# Patient Record
Sex: MALE | Race: WHITE | NOT HISPANIC OR LATINO | Employment: FULL TIME | URBAN - METROPOLITAN AREA
[De-identification: names, ages, dates, MRNs, and addresses within clinical notes are randomized per-mention and may not be internally consistent; named-entity substitution may affect disease eponyms.]

---

## 2018-05-07 ENCOUNTER — CONVERSION ENCOUNTER (OUTPATIENT)
Dept: FAMILY MEDICINE CLINIC | Facility: CLINIC | Age: 38
End: 2018-05-07

## 2018-05-07 ENCOUNTER — OFFICE VISIT CONVERTED (OUTPATIENT)
Dept: FAMILY MEDICINE CLINIC | Facility: CLINIC | Age: 38
End: 2018-05-07
Attending: FAMILY MEDICINE

## 2018-06-21 ENCOUNTER — OFFICE VISIT CONVERTED (OUTPATIENT)
Dept: CARDIOLOGY | Facility: CLINIC | Age: 38
End: 2018-06-21
Attending: INTERNAL MEDICINE

## 2018-10-11 ENCOUNTER — OFFICE VISIT CONVERTED (OUTPATIENT)
Dept: CARDIOLOGY | Facility: CLINIC | Age: 38
End: 2018-10-11
Attending: INTERNAL MEDICINE

## 2018-11-13 ENCOUNTER — OFFICE VISIT CONVERTED (OUTPATIENT)
Dept: FAMILY MEDICINE CLINIC | Facility: CLINIC | Age: 38
End: 2018-11-13
Attending: FAMILY MEDICINE

## 2018-12-21 ENCOUNTER — OFFICE VISIT CONVERTED (OUTPATIENT)
Dept: FAMILY MEDICINE CLINIC | Facility: CLINIC | Age: 38
End: 2018-12-21
Attending: NURSE PRACTITIONER

## 2019-01-11 ENCOUNTER — OFFICE VISIT CONVERTED (OUTPATIENT)
Dept: CARDIOLOGY | Facility: CLINIC | Age: 39
End: 2019-01-11
Attending: INTERNAL MEDICINE

## 2019-01-30 ENCOUNTER — OFFICE VISIT CONVERTED (OUTPATIENT)
Dept: GASTROENTEROLOGY | Facility: CLINIC | Age: 39
End: 2019-01-30
Attending: NURSE PRACTITIONER

## 2019-02-04 ENCOUNTER — HOSPITAL ENCOUNTER (OUTPATIENT)
Dept: LAB | Facility: HOSPITAL | Age: 39
Discharge: HOME | End: 2019-02-04
Attending: NURSE PRACTITIONER

## 2019-02-04 LAB
CONV AFP: 5.2 NG/ML (ref 0–8.7)
FERRITIN SERPL-MCNC: 869 NG/ML (ref 30–300)
INR PPP: 1.01 (ref 2–3)
IRON SATN MFR SERPL: 25 % (ref 20–55)
IRON SERPL-MCNC: 93 UG/DL (ref 70–180)
PROTHROMBIN TIME: 10.5 S (ref 9.4–12)
TIBC SERPL-MCNC: 378 UG/DL (ref 245–450)
TRANSFERRIN SERPL-MCNC: 264 MG/DL (ref 215–365)

## 2019-02-05 LAB
CERULOPLASMIN SERPL-MCNC: 21.3 MG/DL (ref 16–31)
CONV ANTI NUCLEAR ANTIBODY WITH REFLEX: NEGATIVE
CONV HEPATITIS B SURFACE AG W CONFIRMATION RE: NEGATIVE
CONV IMMUNOGLOBULIN G (IGG): 825 MG/DL (ref 700–1600)
CONV IMMUNOGLOBULIN M (IGM): 118 MG/DL (ref 20–172)
DEPRECATED MITOCHONDRIA M2 IGG SER-ACNC: <20 UNITS (ref 0–20)
HAV IGM SERPL QL IA: NEGATIVE
HBV CORE IGM SERPL QL IA: NEGATIVE
HCV AB SER DONR QL: <0.1 S/CO RATIO (ref 0–0.9)
IGA SERPL-MCNC: 243 MG/DL (ref 90–386)
SMOOTH MUSCLE F-ACTIN AB IGG: 9 UNITS (ref 0–19)

## 2019-02-06 LAB — COPPER SERPL-MCNC: 82 UG/DL (ref 72–166)

## 2019-02-07 LAB — CONV NASH FIBROSURE: NORMAL

## 2019-02-08 LAB
A1AT SERPL-MCNC: 130 MG/DL (ref 90–200)
PHENOTYPE: NORMAL

## 2019-02-28 ENCOUNTER — OFFICE VISIT CONVERTED (OUTPATIENT)
Dept: GASTROENTEROLOGY | Facility: CLINIC | Age: 39
End: 2019-02-28
Attending: NURSE PRACTITIONER

## 2019-05-06 ENCOUNTER — HOSPITAL ENCOUNTER (OUTPATIENT)
Dept: GENERAL RADIOLOGY | Facility: HOSPITAL | Age: 39
Discharge: HOME | End: 2019-05-06
Attending: NURSE PRACTITIONER

## 2019-07-10 ENCOUNTER — HOSPITAL ENCOUNTER (OUTPATIENT)
Dept: LAB | Facility: HOSPITAL | Age: 39
Discharge: HOME | End: 2019-07-10
Attending: NURSE PRACTITIONER

## 2019-07-10 LAB
ALBUMIN SERPL-MCNC: 4.7 G/DL (ref 3.5–5)
ALBUMIN SERPL-MCNC: 4.8 G/DL (ref 3.5–5)
ALBUMIN/GLOB SERPL: 1.6 {RATIO} (ref 1.4–2.6)
ALP SERPL-CCNC: 39 U/L (ref 53–128)
ALP SERPL-CCNC: 40 U/L (ref 53–128)
ALT SERPL-CCNC: 122 U/L (ref 10–40)
ALT SERPL-CCNC: 122 U/L (ref 10–40)
ANION GAP SERPL CALC-SCNC: 18 MMOL/L (ref 8–19)
AST SERPL-CCNC: 59 U/L (ref 15–50)
AST SERPL-CCNC: 59 U/L (ref 15–50)
BASOPHILS # BLD AUTO: 0.02 10*3/UL (ref 0–0.2)
BASOPHILS NFR BLD AUTO: 0.3 % (ref 0–3)
BILIRUB SERPL-MCNC: 0.49 MG/DL (ref 0.2–1.3)
BILIRUB SERPL-MCNC: 0.52 MG/DL (ref 0.2–1.3)
BUN SERPL-MCNC: 15 MG/DL (ref 5–25)
BUN/CREAT SERPL: 15 {RATIO} (ref 6–20)
CALCIUM SERPL-MCNC: 9.4 MG/DL (ref 8.7–10.4)
CHLORIDE SERPL-SCNC: 100 MMOL/L (ref 99–111)
CHOLEST SERPL-MCNC: 239 MG/DL (ref 107–200)
CHOLEST/HDLC SERPL: 8 {RATIO} (ref 3–6)
CONV ABS IMM GRAN: 0.06 10*3/UL (ref 0–0.2)
CONV BILI, CONJUGATED: <0.2 MG/DL (ref 0–0.6)
CONV CO2: 26 MMOL/L (ref 22–32)
CONV IMMATURE GRAN: 0.9 % (ref 0–1.8)
CONV TOTAL PROTEIN: 7.5 G/DL (ref 6.3–8.2)
CONV TOTAL PROTEIN: 7.6 G/DL (ref 6.3–8.2)
CONV UNCONJUGATED BILIRUBIN: 0.3 MG/DL (ref 0–1.1)
CREAT UR-MCNC: 1 MG/DL (ref 0.7–1.2)
DEPRECATED RDW RBC AUTO: 39.7 FL (ref 35.1–43.9)
EOSINOPHIL # BLD AUTO: 0.14 10*3/UL (ref 0–0.7)
EOSINOPHIL # BLD AUTO: 2.1 % (ref 0–7)
ERYTHROCYTE [DISTWIDTH] IN BLOOD BY AUTOMATED COUNT: 12.8 % (ref 11.6–14.4)
GFR SERPLBLD BASED ON 1.73 SQ M-ARVRAT: >60 ML/MIN/{1.73_M2}
GLOBULIN UR ELPH-MCNC: 2.9 G/DL (ref 2–3.5)
GLUCOSE SERPL-MCNC: 102 MG/DL (ref 70–99)
HBA1C MFR BLD: 14.2 G/DL (ref 14–18)
HCT VFR BLD AUTO: 42.7 % (ref 42–52)
HDLC SERPL-MCNC: 30 MG/DL (ref 40–60)
INR PPP: 1.03 (ref 2–3)
LDLC SERPL CALC-MCNC: 148 MG/DL (ref 70–100)
LYMPHOCYTES # BLD AUTO: 2.87 10*3/UL (ref 1–5)
MCH RBC QN AUTO: 28.7 PG (ref 27–31)
MCHC RBC AUTO-ENTMCNC: 33.3 G/DL (ref 33–37)
MCV RBC AUTO: 86.4 FL (ref 80–96)
MONOCYTES # BLD AUTO: 0.45 10*3/UL (ref 0.2–1.2)
MONOCYTES NFR BLD AUTO: 6.6 % (ref 3–10)
NEUTROPHILS # BLD AUTO: 3.28 10*3/UL (ref 2–8)
NEUTROPHILS NFR BLD AUTO: 48 % (ref 30–85)
NRBC CBCN: 0 % (ref 0–0.7)
OSMOLALITY SERPL CALC.SUM OF ELEC: 291 MOSM/KG (ref 273–304)
PLATELET # BLD AUTO: 199 10*3/UL (ref 130–400)
PMV BLD AUTO: 11.2 FL (ref 9.4–12.4)
POTASSIUM SERPL-SCNC: 4.2 MMOL/L (ref 3.5–5.3)
PROTHROMBIN TIME: 10.6 S (ref 9.4–12)
RBC # BLD AUTO: 4.94 10*6/UL (ref 4.7–6.1)
SODIUM SERPL-SCNC: 140 MMOL/L (ref 135–147)
TRIGL SERPL-MCNC: 481 MG/DL (ref 40–150)
VARIANT LYMPHS NFR BLD MANUAL: 42.1 % (ref 20–45)
WBC # BLD AUTO: 6.82 10*3/UL (ref 4.8–10.8)

## 2019-07-17 ENCOUNTER — OFFICE VISIT CONVERTED (OUTPATIENT)
Dept: CARDIOLOGY | Facility: CLINIC | Age: 39
End: 2019-07-17
Attending: INTERNAL MEDICINE

## 2019-08-28 ENCOUNTER — OFFICE VISIT CONVERTED (OUTPATIENT)
Dept: GASTROENTEROLOGY | Facility: CLINIC | Age: 39
End: 2019-08-28
Attending: NURSE PRACTITIONER

## 2019-09-11 ENCOUNTER — HOSPITAL ENCOUNTER (OUTPATIENT)
Dept: LAB | Facility: HOSPITAL | Age: 39
Discharge: HOME | End: 2019-09-11
Attending: NURSE PRACTITIONER

## 2019-09-11 LAB
APTT BLD: 23.7 S (ref 22.2–34.2)
BASOPHILS # BLD AUTO: 0.03 10*3/UL (ref 0–0.2)
BASOPHILS NFR BLD AUTO: 0.4 % (ref 0–3)
CONV ABS IMM GRAN: 0.02 10*3/UL (ref 0–0.2)
CONV IMMATURE GRAN: 0.3 % (ref 0–1.8)
DEPRECATED RDW RBC AUTO: 37.3 FL (ref 35.1–43.9)
EOSINOPHIL # BLD AUTO: 0.18 10*3/UL (ref 0–0.7)
EOSINOPHIL # BLD AUTO: 2.3 % (ref 0–7)
ERYTHROCYTE [DISTWIDTH] IN BLOOD BY AUTOMATED COUNT: 12.4 % (ref 11.6–14.4)
HCT VFR BLD AUTO: 41.5 % (ref 42–52)
HGB BLD-MCNC: 14.4 G/DL (ref 14–18)
INR PPP: 1.02 (ref 2–3)
LYMPHOCYTES # BLD AUTO: 3.93 10*3/UL (ref 1–5)
LYMPHOCYTES NFR BLD AUTO: 49.9 % (ref 20–45)
MCH RBC QN AUTO: 29 PG (ref 27–31)
MCHC RBC AUTO-ENTMCNC: 34.7 G/DL (ref 33–37)
MCV RBC AUTO: 83.5 FL (ref 80–96)
MONOCYTES # BLD AUTO: 0.5 10*3/UL (ref 0.2–1.2)
MONOCYTES NFR BLD AUTO: 6.4 % (ref 3–10)
NEUTROPHILS # BLD AUTO: 3.21 10*3/UL (ref 2–8)
NEUTROPHILS NFR BLD AUTO: 40.7 % (ref 30–85)
NRBC CBCN: 0 % (ref 0–0.7)
PLATELET # BLD AUTO: 204 10*3/UL (ref 130–400)
PMV BLD AUTO: 10.7 FL (ref 9.4–12.4)
PROTHROMBIN TIME: 10.6 S (ref 9.4–12)
RBC # BLD AUTO: 4.97 10*6/UL (ref 4.7–6.1)
WBC # BLD AUTO: 7.87 10*3/UL (ref 4.8–10.8)

## 2019-09-13 ENCOUNTER — HOSPITAL ENCOUNTER (OUTPATIENT)
Dept: CT IMAGING | Facility: HOSPITAL | Age: 39
Discharge: HOME | End: 2019-09-13
Attending: NURSE PRACTITIONER

## 2019-10-21 ENCOUNTER — HOSPITAL ENCOUNTER (OUTPATIENT)
Dept: LAB | Facility: HOSPITAL | Age: 39
Discharge: HOME | End: 2019-10-21
Attending: INTERNAL MEDICINE

## 2019-10-21 LAB
ALBUMIN SERPL-MCNC: 5.1 G/DL (ref 3.5–5)
ALBUMIN/GLOB SERPL: 2 {RATIO} (ref 1.4–2.6)
ALP SERPL-CCNC: 38 U/L (ref 53–128)
ALT SERPL-CCNC: 96 U/L (ref 10–40)
ANION GAP SERPL CALC-SCNC: 19 MMOL/L (ref 8–19)
AST SERPL-CCNC: 51 U/L (ref 15–50)
BILIRUB SERPL-MCNC: 0.55 MG/DL (ref 0.2–1.3)
BUN SERPL-MCNC: 15 MG/DL (ref 5–25)
BUN/CREAT SERPL: 16 {RATIO} (ref 6–20)
CALCIUM SERPL-MCNC: 9.9 MG/DL (ref 8.7–10.4)
CHLORIDE SERPL-SCNC: 101 MMOL/L (ref 99–111)
CHOLEST SERPL-MCNC: 242 MG/DL (ref 107–200)
CHOLEST/HDLC SERPL: 7.3 {RATIO} (ref 3–6)
CONV CO2: 23 MMOL/L (ref 22–32)
CONV TOTAL PROTEIN: 7.6 G/DL (ref 6.3–8.2)
CREAT UR-MCNC: 0.94 MG/DL (ref 0.7–1.2)
GFR SERPLBLD BASED ON 1.73 SQ M-ARVRAT: >60 ML/MIN/{1.73_M2}
GLOBULIN UR ELPH-MCNC: 2.5 G/DL (ref 2–3.5)
GLUCOSE SERPL-MCNC: 96 MG/DL (ref 70–99)
HDLC SERPL-MCNC: 33 MG/DL (ref 40–60)
LDLC SERPL CALC-MCNC: 132 MG/DL (ref 70–100)
OSMOLALITY SERPL CALC.SUM OF ELEC: 289 MOSM/KG (ref 273–304)
POTASSIUM SERPL-SCNC: 4 MMOL/L (ref 3.5–5.3)
SODIUM SERPL-SCNC: 139 MMOL/L (ref 135–147)
TRIGL SERPL-MCNC: 386 MG/DL (ref 40–150)
VLDLC SERPL-MCNC: 77 MG/DL (ref 5–37)

## 2019-11-14 ENCOUNTER — CONVERSION ENCOUNTER (OUTPATIENT)
Dept: FAMILY MEDICINE CLINIC | Facility: CLINIC | Age: 39
End: 2019-11-14

## 2019-11-14 ENCOUNTER — OFFICE VISIT CONVERTED (OUTPATIENT)
Dept: FAMILY MEDICINE CLINIC | Facility: CLINIC | Age: 39
End: 2019-11-14
Attending: FAMILY MEDICINE

## 2019-11-24 ENCOUNTER — HOSPITAL ENCOUNTER (OUTPATIENT)
Dept: URGENT CARE | Facility: CLINIC | Age: 39
Discharge: HOME | End: 2019-11-24
Attending: FAMILY MEDICINE

## 2019-11-26 LAB — BACTERIA SPEC AEROBE CULT: NORMAL

## 2019-12-02 ENCOUNTER — OFFICE VISIT CONVERTED (OUTPATIENT)
Dept: FAMILY MEDICINE CLINIC | Facility: CLINIC | Age: 39
End: 2019-12-02
Attending: NURSE PRACTITIONER

## 2019-12-02 ENCOUNTER — CONVERSION ENCOUNTER (OUTPATIENT)
Dept: FAMILY MEDICINE CLINIC | Facility: CLINIC | Age: 39
End: 2019-12-02

## 2019-12-30 ENCOUNTER — HOSPITAL ENCOUNTER (OUTPATIENT)
Dept: GASTROENTEROLOGY | Facility: HOSPITAL | Age: 39
Setting detail: HOSPITAL OUTPATIENT SURGERY
Discharge: HOME | End: 2019-12-30
Attending: INTERNAL MEDICINE

## 2020-01-21 ENCOUNTER — HOSPITAL ENCOUNTER (OUTPATIENT)
Dept: LAB | Facility: HOSPITAL | Age: 40
Discharge: HOME | End: 2020-01-21
Attending: INTERNAL MEDICINE

## 2020-01-21 LAB
ALBUMIN SERPL-MCNC: 5 G/DL (ref 3.5–5)
ALP SERPL-CCNC: 33 U/L (ref 53–128)
ALT SERPL-CCNC: 120 U/L (ref 10–40)
ANION GAP SERPL CALC-SCNC: 19 MMOL/L (ref 8–19)
AST SERPL-CCNC: 61 U/L (ref 15–50)
BILIRUB SERPL-MCNC: 0.49 MG/DL (ref 0.2–1.3)
BUN SERPL-MCNC: 17 MG/DL (ref 5–25)
BUN/CREAT SERPL: 16 {RATIO} (ref 6–20)
CALCIUM SERPL-MCNC: 10 MG/DL (ref 8.7–10.4)
CHLORIDE SERPL-SCNC: 99 MMOL/L (ref 99–111)
CHOLEST SERPL-MCNC: 273 MG/DL (ref 107–200)
CHOLEST/HDLC SERPL: 8.3 {RATIO} (ref 3–6)
CONV BILI, CONJUGATED: <0.2 MG/DL (ref 0–0.6)
CONV CO2: 26 MMOL/L (ref 22–32)
CONV TOTAL PROTEIN: 8 G/DL (ref 6.3–8.2)
CONV UNCONJUGATED BILIRUBIN: 0.3 MG/DL (ref 0–1.1)
CREAT UR-MCNC: 1.07 MG/DL (ref 0.7–1.2)
GFR SERPLBLD BASED ON 1.73 SQ M-ARVRAT: >60 ML/MIN/{1.73_M2}
GLUCOSE SERPL-MCNC: 99 MG/DL (ref 70–99)
HDLC SERPL-MCNC: 33 MG/DL (ref 40–60)
LDLC SERPL CALC-MCNC: 189 MG/DL (ref 70–100)
OSMOLALITY SERPL CALC.SUM OF ELEC: 292 MOSM/KG (ref 273–304)
POTASSIUM SERPL-SCNC: 4 MMOL/L (ref 3.5–5.3)
SODIUM SERPL-SCNC: 140 MMOL/L (ref 135–147)
TRIGL SERPL-MCNC: 476 MG/DL (ref 40–150)

## 2020-01-30 ENCOUNTER — OFFICE VISIT CONVERTED (OUTPATIENT)
Dept: CARDIOLOGY | Facility: CLINIC | Age: 40
End: 2020-01-30
Attending: INTERNAL MEDICINE

## 2020-02-07 ENCOUNTER — HOSPITAL ENCOUNTER (OUTPATIENT)
Dept: GENERAL RADIOLOGY | Facility: HOSPITAL | Age: 40
Discharge: HOME | End: 2020-02-07
Attending: FAMILY MEDICINE

## 2020-02-07 ENCOUNTER — OFFICE VISIT CONVERTED (OUTPATIENT)
Dept: FAMILY MEDICINE CLINIC | Facility: CLINIC | Age: 40
End: 2020-02-07
Attending: FAMILY MEDICINE

## 2020-02-13 ENCOUNTER — OFFICE VISIT CONVERTED (OUTPATIENT)
Dept: GASTROENTEROLOGY | Facility: CLINIC | Age: 40
End: 2020-02-13
Attending: NURSE PRACTITIONER

## 2020-03-30 ENCOUNTER — HOSPITAL ENCOUNTER (OUTPATIENT)
Dept: LAB | Facility: HOSPITAL | Age: 40
Discharge: HOME | End: 2020-03-30
Attending: NURSE PRACTITIONER

## 2020-03-30 LAB
ALBUMIN SERPL-MCNC: 4.7 G/DL (ref 3.5–5)
ALP SERPL-CCNC: 31 U/L (ref 53–128)
ALT SERPL-CCNC: 123 U/L (ref 10–40)
AST SERPL-CCNC: 66 U/L (ref 15–50)
BILIRUB SERPL-MCNC: 0.3 MG/DL (ref 0.2–1.3)
CONV BILI, CONJUGATED: <0.2 MG/DL (ref 0–0.6)
CONV TOTAL PROTEIN: 7.6 G/DL (ref 6.3–8.2)
CONV UNCONJUGATED BILIRUBIN: 0.1 MG/DL (ref 0–1.1)

## 2020-05-01 ENCOUNTER — HOSPITAL ENCOUNTER (OUTPATIENT)
Dept: FAMILY MEDICINE CLINIC | Facility: CLINIC | Age: 40
Discharge: HOME | End: 2020-05-01
Attending: FAMILY MEDICINE

## 2020-05-01 LAB
APPEARANCE UR: CLEAR
BILIRUB UR QL: NEGATIVE
COLOR UR: YELLOW
CONV COLLECTION SOURCE (UA): NORMAL
CONV UROBILINOGEN IN URINE BY AUTOMATED TEST STRIP: 0.2 {EHRLICHU}/DL (ref 0.1–1)
GLUCOSE UR QL: NEGATIVE MG/DL
HGB UR QL STRIP: NEGATIVE
KETONES UR QL STRIP: NEGATIVE MG/DL
LEUKOCYTE ESTERASE UR QL STRIP: NEGATIVE
NITRITE UR QL STRIP: NEGATIVE
PH UR STRIP.AUTO: 6.5 [PH] (ref 5–8)
PROT UR QL: NEGATIVE MG/DL
SP GR UR: 1.02 (ref 1–1.03)

## 2020-05-14 ENCOUNTER — HOSPITAL ENCOUNTER (OUTPATIENT)
Dept: FAMILY MEDICINE CLINIC | Facility: CLINIC | Age: 40
Discharge: HOME | End: 2020-05-14
Attending: FAMILY MEDICINE

## 2020-05-14 ENCOUNTER — OFFICE VISIT CONVERTED (OUTPATIENT)
Dept: FAMILY MEDICINE CLINIC | Facility: CLINIC | Age: 40
End: 2020-05-14
Attending: FAMILY MEDICINE

## 2020-05-14 LAB
APPEARANCE UR: CLEAR
BILIRUB UR QL: NEGATIVE
COLOR UR: YELLOW
CONV COLLECTION SOURCE (UA): NORMAL
CONV UROBILINOGEN IN URINE BY AUTOMATED TEST STRIP: 0.2 {EHRLICHU}/DL (ref 0.1–1)
GLUCOSE UR QL: NEGATIVE MG/DL
HGB UR QL STRIP: NEGATIVE
KETONES UR QL STRIP: NEGATIVE MG/DL
LEUKOCYTE ESTERASE UR QL STRIP: NEGATIVE
NITRITE UR QL STRIP: NEGATIVE
PH UR STRIP.AUTO: 5 [PH] (ref 5–8)
PROT UR QL: NEGATIVE MG/DL
SP GR UR: 1.01 (ref 1–1.03)

## 2020-05-16 LAB — BACTERIA UR CULT: NORMAL

## 2020-05-19 ENCOUNTER — TELEPHONE CONVERTED (OUTPATIENT)
Dept: FAMILY MEDICINE CLINIC | Facility: CLINIC | Age: 40
End: 2020-05-19
Attending: FAMILY MEDICINE

## 2020-05-22 ENCOUNTER — HOSPITAL ENCOUNTER (OUTPATIENT)
Dept: FAMILY MEDICINE CLINIC | Facility: CLINIC | Age: 40
Discharge: HOME | End: 2020-05-22
Attending: NURSE PRACTITIONER

## 2020-05-22 ENCOUNTER — OFFICE VISIT CONVERTED (OUTPATIENT)
Dept: FAMILY MEDICINE CLINIC | Facility: CLINIC | Age: 40
End: 2020-05-22
Attending: NURSE PRACTITIONER

## 2020-05-24 LAB
BACTERIA SPEC AEROBE CULT: NORMAL
BACTERIA SPEC AEROBE CULT: NORMAL

## 2020-06-04 ENCOUNTER — HOSPITAL ENCOUNTER (OUTPATIENT)
Dept: GENERAL RADIOLOGY | Facility: HOSPITAL | Age: 40
Discharge: HOME | End: 2020-06-04
Attending: FAMILY MEDICINE

## 2020-06-12 ENCOUNTER — HOSPITAL ENCOUNTER (OUTPATIENT)
Dept: LAB | Facility: HOSPITAL | Age: 40
Discharge: HOME | End: 2020-06-12
Attending: FAMILY MEDICINE

## 2020-06-12 LAB
25(OH)D3 SERPL-MCNC: 20.9 NG/ML (ref 30–100)
ALBUMIN SERPL-MCNC: 4.8 G/DL (ref 3.5–5)
ALBUMIN SERPL-MCNC: 4.8 G/DL (ref 3.5–5)
ALBUMIN/GLOB SERPL: 1.8 {RATIO} (ref 1.4–2.6)
ALP SERPL-CCNC: 34 U/L (ref 53–128)
ALP SERPL-CCNC: 35 U/L (ref 53–128)
ALT SERPL-CCNC: 193 U/L (ref 10–40)
ALT SERPL-CCNC: 193 U/L (ref 10–40)
ANION GAP SERPL CALC-SCNC: 18 MMOL/L (ref 8–19)
AST SERPL-CCNC: 100 U/L (ref 15–50)
AST SERPL-CCNC: 99 U/L (ref 15–50)
BASOPHILS # BLD AUTO: 0.03 10*3/UL (ref 0–0.2)
BASOPHILS NFR BLD AUTO: 0.4 % (ref 0–3)
BILIRUB SERPL-MCNC: 0.49 MG/DL (ref 0.2–1.3)
BILIRUB SERPL-MCNC: 0.5 MG/DL (ref 0.2–1.3)
BUN SERPL-MCNC: 15 MG/DL (ref 5–25)
BUN/CREAT SERPL: 14 {RATIO} (ref 6–20)
CALCIUM SERPL-MCNC: 9.8 MG/DL (ref 8.7–10.4)
CHLORIDE SERPL-SCNC: 99 MMOL/L (ref 99–111)
CONV ABS IMM GRAN: 0.03 10*3/UL (ref 0–0.2)
CONV BILI, CONJUGATED: <0.2 MG/DL (ref 0–0.6)
CONV CO2: 24 MMOL/L (ref 22–32)
CONV IMMATURE GRAN: 0.4 % (ref 0–1.8)
CONV TOTAL PROTEIN: 7.4 G/DL (ref 6.3–8.2)
CONV TOTAL PROTEIN: 7.5 G/DL (ref 6.3–8.2)
CONV UNCONJUGATED BILIRUBIN: 0.3 MG/DL (ref 0–1.1)
CREAT UR-MCNC: 1.07 MG/DL (ref 0.7–1.2)
DEPRECATED RDW RBC AUTO: 39.8 FL (ref 35.1–43.9)
EOSINOPHIL # BLD AUTO: 0.12 10*3/UL (ref 0–0.7)
EOSINOPHIL # BLD AUTO: 1.7 % (ref 0–7)
ERYTHROCYTE [DISTWIDTH] IN BLOOD BY AUTOMATED COUNT: 13.1 % (ref 11.6–14.4)
FOLATE SERPL-MCNC: >20 NG/ML (ref 4.8–20)
GFR SERPLBLD BASED ON 1.73 SQ M-ARVRAT: >60 ML/MIN/{1.73_M2}
GLOBULIN UR ELPH-MCNC: 2.6 G/DL (ref 2–3.5)
GLUCOSE SERPL-MCNC: 96 MG/DL (ref 70–99)
HCT VFR BLD AUTO: 43.7 % (ref 42–52)
HGB BLD-MCNC: 14.8 G/DL (ref 14–18)
LYMPHOCYTES # BLD AUTO: 3.27 10*3/UL (ref 1–5)
LYMPHOCYTES NFR BLD AUTO: 47.5 % (ref 20–45)
MCH RBC QN AUTO: 29 PG (ref 27–31)
MCHC RBC AUTO-ENTMCNC: 33.9 G/DL (ref 33–37)
MCV RBC AUTO: 85.7 FL (ref 80–96)
MONOCYTES # BLD AUTO: 0.47 10*3/UL (ref 0.2–1.2)
MONOCYTES NFR BLD AUTO: 6.8 % (ref 3–10)
NEUTROPHILS # BLD AUTO: 2.97 10*3/UL (ref 2–8)
NEUTROPHILS NFR BLD AUTO: 43.2 % (ref 30–85)
NRBC CBCN: 0 % (ref 0–0.7)
OSMOLALITY SERPL CALC.SUM OF ELEC: 285 MOSM/KG (ref 273–304)
PLATELET # BLD AUTO: 210 10*3/UL (ref 130–400)
PMV BLD AUTO: 11.5 FL (ref 9.4–12.4)
POTASSIUM SERPL-SCNC: 4.2 MMOL/L (ref 3.5–5.3)
RBC # BLD AUTO: 5.1 10*6/UL (ref 4.7–6.1)
SODIUM SERPL-SCNC: 137 MMOL/L (ref 135–147)
VIT B12 SERPL-MCNC: 420 PG/ML (ref 211–911)
WBC # BLD AUTO: 6.89 10*3/UL (ref 4.8–10.8)

## 2020-06-15 LAB
CONV EBV EARLY ANTIGEN: <5 U/ML (ref 0–10.9)
CONV EBV NUCLEAR ANTIGEN: 89.1 U/ML (ref 0–21.9)
CONV EPSTEIN BARR VIRAL CAPSID IGM: 10.3 U/ML (ref 0–43.9)
CONV EPSTEIN BARR VIRUS ANTIBODY TO VIRAL CAPSID IGG: 141 U/ML (ref 0–21.9)

## 2020-06-22 ENCOUNTER — OFFICE VISIT CONVERTED (OUTPATIENT)
Dept: FAMILY MEDICINE CLINIC | Facility: CLINIC | Age: 40
End: 2020-06-22
Attending: FAMILY MEDICINE

## 2020-06-26 ENCOUNTER — HOSPITAL ENCOUNTER (OUTPATIENT)
Dept: LAB | Facility: HOSPITAL | Age: 40
Discharge: HOME | End: 2020-06-26
Attending: NURSE PRACTITIONER

## 2020-06-26 LAB
ALBUMIN SERPL-MCNC: 5 G/DL (ref 3.5–5)
ALP SERPL-CCNC: 34 U/L (ref 53–128)
ALT SERPL-CCNC: 203 U/L (ref 10–40)
AST SERPL-CCNC: 94 U/L (ref 15–50)
BILIRUB SERPL-MCNC: 0.45 MG/DL (ref 0.2–1.3)
CONV BILI, CONJUGATED: <0.2 MG/DL (ref 0–0.6)
CONV TOTAL PROTEIN: 7.7 G/DL (ref 6.3–8.2)
CONV UNCONJUGATED BILIRUBIN: 0.3 MG/DL (ref 0–1.1)

## 2020-07-10 ENCOUNTER — HOSPITAL ENCOUNTER (OUTPATIENT)
Dept: LAB | Facility: HOSPITAL | Age: 40
Discharge: HOME | End: 2020-07-10
Attending: NURSE PRACTITIONER

## 2020-07-10 LAB
ALBUMIN SERPL-MCNC: 4.7 G/DL (ref 3.5–5)
ALP SERPL-CCNC: 30 U/L (ref 53–128)
ALT SERPL-CCNC: 154 U/L (ref 10–40)
AST SERPL-CCNC: 70 U/L (ref 15–50)
BILIRUB SERPL-MCNC: 0.37 MG/DL (ref 0.2–1.3)
CONV BILI, CONJUGATED: <0.2 MG/DL (ref 0–0.6)
CONV TOTAL PROTEIN: 7.2 G/DL (ref 6.3–8.2)
CONV UNCONJUGATED BILIRUBIN: 0.2 MG/DL (ref 0–1.1)
INR PPP: 1.41 (ref 2–3)
PROTHROMBIN TIME: 14.4 S (ref 9.4–12)

## 2020-07-19 ENCOUNTER — HOSPITAL ENCOUNTER (OUTPATIENT)
Dept: URGENT CARE | Facility: CLINIC | Age: 40
Discharge: HOME | End: 2020-07-19
Attending: FAMILY MEDICINE

## 2020-08-17 ENCOUNTER — OFFICE VISIT CONVERTED (OUTPATIENT)
Dept: GASTROENTEROLOGY | Facility: CLINIC | Age: 40
End: 2020-08-17
Attending: NURSE PRACTITIONER

## 2020-08-20 ENCOUNTER — HOSPITAL ENCOUNTER (OUTPATIENT)
Dept: LAB | Facility: HOSPITAL | Age: 40
Discharge: HOME | End: 2020-08-20
Attending: NURSE PRACTITIONER

## 2020-08-20 LAB
ALBUMIN SERPL-MCNC: 4.7 G/DL (ref 3.5–5)
ALP SERPL-CCNC: 40 U/L (ref 53–128)
ALT SERPL-CCNC: 133 U/L (ref 10–40)
AST SERPL-CCNC: 65 U/L (ref 15–50)
BILIRUB SERPL-MCNC: 0.55 MG/DL (ref 0.2–1.3)
CONV BILI, CONJUGATED: <0.2 MG/DL (ref 0–0.6)
CONV TOTAL PROTEIN: 7.5 G/DL (ref 6.3–8.2)
CONV UNCONJUGATED BILIRUBIN: 0.4 MG/DL (ref 0–1.1)
INR PPP: 1.04 (ref 2–3)
PROTHROMBIN TIME: 11.1 S (ref 9.4–12)

## 2020-09-15 ENCOUNTER — HOSPITAL ENCOUNTER (OUTPATIENT)
Dept: LAB | Facility: HOSPITAL | Age: 40
Discharge: HOME | End: 2020-09-15
Attending: INTERNAL MEDICINE

## 2020-09-15 LAB
ALBUMIN SERPL-MCNC: 4.7 G/DL (ref 3.5–5)
ALBUMIN/GLOB SERPL: 1.7 {RATIO} (ref 1.4–2.6)
ALP SERPL-CCNC: 40 U/L (ref 53–128)
ALT SERPL-CCNC: 79 U/L (ref 10–40)
ANION GAP SERPL CALC-SCNC: 18 MMOL/L (ref 8–19)
AST SERPL-CCNC: 43 U/L (ref 15–50)
BILIRUB SERPL-MCNC: 0.78 MG/DL (ref 0.2–1.3)
BUN SERPL-MCNC: 17 MG/DL (ref 5–25)
BUN/CREAT SERPL: 16 {RATIO} (ref 6–20)
CALCIUM SERPL-MCNC: 9.5 MG/DL (ref 8.7–10.4)
CHLORIDE SERPL-SCNC: 99 MMOL/L (ref 99–111)
CHOLEST SERPL-MCNC: 237 MG/DL (ref 107–200)
CHOLEST/HDLC SERPL: 7.9 {RATIO} (ref 3–6)
CONV CO2: 25 MMOL/L (ref 22–32)
CONV TOTAL PROTEIN: 7.5 G/DL (ref 6.3–8.2)
CREAT UR-MCNC: 1.08 MG/DL (ref 0.7–1.2)
GFR SERPLBLD BASED ON 1.73 SQ M-ARVRAT: >60 ML/MIN/{1.73_M2}
GLOBULIN UR ELPH-MCNC: 2.8 G/DL (ref 2–3.5)
GLUCOSE SERPL-MCNC: 105 MG/DL (ref 70–99)
HDLC SERPL-MCNC: 30 MG/DL (ref 40–60)
LDLC SERPL CALC-MCNC: 130 MG/DL (ref 70–100)
OSMOLALITY SERPL CALC.SUM OF ELEC: 286 MOSM/KG (ref 273–304)
POTASSIUM SERPL-SCNC: 4.6 MMOL/L (ref 3.5–5.3)
SODIUM SERPL-SCNC: 137 MMOL/L (ref 135–147)
TRIGL SERPL-MCNC: 384 MG/DL (ref 40–150)
VLDLC SERPL-MCNC: 77 MG/DL (ref 5–37)

## 2020-09-22 ENCOUNTER — OFFICE VISIT CONVERTED (OUTPATIENT)
Dept: FAMILY MEDICINE CLINIC | Facility: CLINIC | Age: 40
End: 2020-09-22
Attending: FAMILY MEDICINE

## 2020-09-23 ENCOUNTER — HOSPITAL ENCOUNTER (OUTPATIENT)
Dept: GENERAL RADIOLOGY | Facility: HOSPITAL | Age: 40
Discharge: HOME | End: 2020-09-23
Attending: FAMILY MEDICINE

## 2020-09-24 ENCOUNTER — OFFICE VISIT CONVERTED (OUTPATIENT)
Dept: CARDIOLOGY | Facility: CLINIC | Age: 40
End: 2020-09-24
Attending: INTERNAL MEDICINE

## 2020-09-28 ENCOUNTER — OFFICE VISIT CONVERTED (OUTPATIENT)
Dept: FAMILY MEDICINE CLINIC | Facility: CLINIC | Age: 40
End: 2020-09-28
Attending: FAMILY MEDICINE

## 2020-09-28 ENCOUNTER — CONVERSION ENCOUNTER (OUTPATIENT)
Dept: FAMILY MEDICINE CLINIC | Facility: CLINIC | Age: 40
End: 2020-09-28

## 2020-10-13 ENCOUNTER — HOSPITAL ENCOUNTER (OUTPATIENT)
Dept: GENERAL RADIOLOGY | Facility: HOSPITAL | Age: 40
Discharge: HOME | End: 2020-10-13
Attending: FAMILY MEDICINE

## 2020-11-17 ENCOUNTER — TELEMEDICINE CONVERTED (OUTPATIENT)
Dept: GASTROENTEROLOGY | Facility: CLINIC | Age: 40
End: 2020-11-17
Attending: NURSE PRACTITIONER

## 2020-12-17 ENCOUNTER — HOSPITAL ENCOUNTER (OUTPATIENT)
Dept: GENERAL RADIOLOGY | Facility: HOSPITAL | Age: 40
Discharge: HOME | End: 2020-12-17
Attending: NURSE PRACTITIONER

## 2020-12-17 ENCOUNTER — HOSPITAL ENCOUNTER (OUTPATIENT)
Dept: LAB | Facility: HOSPITAL | Age: 40
Discharge: HOME | End: 2020-12-17
Attending: FAMILY MEDICINE

## 2020-12-17 LAB
25(OH)D3 SERPL-MCNC: 18.2 NG/ML (ref 30–100)
ALBUMIN SERPL-MCNC: 4.6 G/DL (ref 3.5–5)
ALBUMIN/GLOB SERPL: 1.7 {RATIO} (ref 1.4–2.6)
ALP SERPL-CCNC: 42 U/L (ref 53–128)
ALT SERPL-CCNC: 36 U/L (ref 10–40)
ANION GAP SERPL CALC-SCNC: 13 MMOL/L (ref 8–19)
AST SERPL-CCNC: 28 U/L (ref 15–50)
BASOPHILS # BLD AUTO: 0.02 10*3/UL (ref 0–0.2)
BASOPHILS NFR BLD AUTO: 0.4 % (ref 0–3)
BILIRUB SERPL-MCNC: 0.51 MG/DL (ref 0.2–1.3)
BUN SERPL-MCNC: 16 MG/DL (ref 5–25)
BUN/CREAT SERPL: 17 {RATIO} (ref 6–20)
CALCIUM SERPL-MCNC: 9.2 MG/DL (ref 8.7–10.4)
CHLORIDE SERPL-SCNC: 101 MMOL/L (ref 99–111)
CHOLEST SERPL-MCNC: 199 MG/DL (ref 107–200)
CHOLEST/HDLC SERPL: 6.2 {RATIO} (ref 3–6)
CONV ABS IMM GRAN: 0.02 10*3/UL (ref 0–0.2)
CONV CO2: 28 MMOL/L (ref 22–32)
CONV IMMATURE GRAN: 0.4 % (ref 0–1.8)
CONV TOTAL PROTEIN: 7.3 G/DL (ref 6.3–8.2)
CREAT UR-MCNC: 0.92 MG/DL (ref 0.7–1.2)
DEPRECATED RDW RBC AUTO: 38.5 FL (ref 35.1–43.9)
EOSINOPHIL # BLD AUTO: 0.1 10*3/UL (ref 0–0.7)
EOSINOPHIL # BLD AUTO: 1.8 % (ref 0–7)
ERYTHROCYTE [DISTWIDTH] IN BLOOD BY AUTOMATED COUNT: 12.8 % (ref 11.6–14.4)
FOLATE SERPL-MCNC: 9.6 NG/ML (ref 4.8–20)
GFR SERPLBLD BASED ON 1.73 SQ M-ARVRAT: >60 ML/MIN/{1.73_M2}
GLOBULIN UR ELPH-MCNC: 2.7 G/DL (ref 2–3.5)
GLUCOSE SERPL-MCNC: 101 MG/DL (ref 70–99)
HCT VFR BLD AUTO: 41.5 % (ref 42–52)
HDLC SERPL-MCNC: 32 MG/DL (ref 40–60)
HGB BLD-MCNC: 14.3 G/DL (ref 14–18)
LDLC SERPL CALC-MCNC: 99 MG/DL (ref 70–100)
LYMPHOCYTES # BLD AUTO: 2.41 10*3/UL (ref 1–5)
LYMPHOCYTES NFR BLD AUTO: 42.4 % (ref 20–45)
MCH RBC QN AUTO: 29.1 PG (ref 27–31)
MCHC RBC AUTO-ENTMCNC: 34.5 G/DL (ref 33–37)
MCV RBC AUTO: 84.3 FL (ref 80–96)
MONOCYTES # BLD AUTO: 0.4 10*3/UL (ref 0.2–1.2)
MONOCYTES NFR BLD AUTO: 7 % (ref 3–10)
NEUTROPHILS # BLD AUTO: 2.73 10*3/UL (ref 2–8)
NEUTROPHILS NFR BLD AUTO: 48 % (ref 30–85)
NRBC CBCN: 0 % (ref 0–0.7)
OSMOLALITY SERPL CALC.SUM OF ELEC: 287 MOSM/KG (ref 273–304)
PLATELET # BLD AUTO: 186 10*3/UL (ref 130–400)
PMV BLD AUTO: 11 FL (ref 9.4–12.4)
POTASSIUM SERPL-SCNC: 4.1 MMOL/L (ref 3.5–5.3)
RBC # BLD AUTO: 4.92 10*6/UL (ref 4.7–6.1)
SODIUM SERPL-SCNC: 138 MMOL/L (ref 135–147)
TESTOST SERPL-MCNC: 354 NG/DL (ref 249–836)
TRIGL SERPL-MCNC: 338 MG/DL (ref 40–150)
TSH SERPL-ACNC: 2.43 M[IU]/L (ref 0.27–4.2)
VIT B12 SERPL-MCNC: 334 PG/ML (ref 211–911)
VLDLC SERPL-MCNC: 68 MG/DL (ref 5–37)
WBC # BLD AUTO: 5.68 10*3/UL (ref 4.8–10.8)

## 2020-12-18 LAB — SARS-COV-2 AB SERPL QL IA: NEGATIVE

## 2020-12-20 LAB — TESTOSTERONE, FREE: 21.8 PG/ML (ref 6.8–21.5)

## 2020-12-28 ENCOUNTER — OFFICE VISIT CONVERTED (OUTPATIENT)
Dept: FAMILY MEDICINE CLINIC | Facility: CLINIC | Age: 40
End: 2020-12-28
Attending: FAMILY MEDICINE

## 2021-01-11 ENCOUNTER — OFFICE VISIT CONVERTED (OUTPATIENT)
Dept: GASTROENTEROLOGY | Facility: CLINIC | Age: 41
End: 2021-01-11
Attending: NURSE PRACTITIONER

## 2021-01-13 ENCOUNTER — HOSPITAL ENCOUNTER (OUTPATIENT)
Dept: LAB | Facility: HOSPITAL | Age: 41
Discharge: HOME | End: 2021-01-13
Attending: NURSE PRACTITIONER

## 2021-01-13 LAB
FERRITIN SERPL-MCNC: 256 NG/ML (ref 30–300)
IRON SATN MFR SERPL: 18 % (ref 20–55)
IRON SERPL-MCNC: 61 UG/DL (ref 70–180)
TIBC SERPL-MCNC: 347 UG/DL (ref 245–450)
TRANSFERRIN SERPL-MCNC: 243 MG/DL (ref 215–365)

## 2021-03-22 ENCOUNTER — CONVERSION ENCOUNTER (OUTPATIENT)
Dept: CARDIOLOGY | Facility: CLINIC | Age: 41
End: 2021-03-22
Attending: INTERNAL MEDICINE

## 2021-03-22 ENCOUNTER — OFFICE VISIT CONVERTED (OUTPATIENT)
Dept: CARDIOLOGY | Facility: CLINIC | Age: 41
End: 2021-03-22
Attending: INTERNAL MEDICINE

## 2021-03-29 ENCOUNTER — OFFICE VISIT CONVERTED (OUTPATIENT)
Dept: CARDIOLOGY | Facility: CLINIC | Age: 41
End: 2021-03-29
Attending: INTERNAL MEDICINE

## 2021-03-29 ENCOUNTER — CONVERSION ENCOUNTER (OUTPATIENT)
Dept: CARDIOLOGY | Facility: CLINIC | Age: 41
End: 2021-03-29

## 2021-04-22 ENCOUNTER — HOSPITAL ENCOUNTER (OUTPATIENT)
Dept: LAB | Facility: HOSPITAL | Age: 41
Discharge: HOME | End: 2021-04-22
Attending: FAMILY MEDICINE

## 2021-04-23 LAB
ASO AB SERPL-ACNC: 27 [IU]/ML (ref 0–200)
CONV RHEUMATOID FACTOR IGM: <10 [IU]/ML (ref 0–14)
CRP SERPL-MCNC: <3 MG/L (ref 0–5)
DSDNA AB SER-ACNC: NEGATIVE [IU]/ML
ENA AB SER IA-ACNC: NEGATIVE {RATIO}
EST. AVERAGE GLUCOSE BLD GHB EST-MCNC: 114 MG/DL
HBA1C MFR BLD: 5.6 % (ref 3.5–5.7)
URATE SERPL-MCNC: 8.5 MG/DL (ref 3.5–8.5)

## 2021-05-10 NOTE — PROCEDURES
Procedure Note      Patient Name: Timi Hernandez   Patient ID: [id number]   Sex: Male   YOB: 1980    Primary Care Provider: Tushar Rutherford MD   Referring Provider: Alla RIVERA    Visit Date: March 22, 2021    Provider: Abram Sawant MD   [facility]: [facility]   Location Address: [address] [place] [address], [facility]   [place]  [id number]   Location Phone: [phone]          FINAL REPORT   HOLTER MONITOR REPORT  Date: 03/22/2021   Indication: Abnormal EKG      Interpretation date: 03/29/2021    48-HOUR HOLTER MONITOR     FINDINGS:   The predominant rhythm was sinus bradycardia to sinus tachycardia with sinus arrhythmia and IVCD. The maximum heart rate was 153 beats per minute on day 2 at 7:14 PM. The minimum heart rate was 47 beats per minute on day 3 at 5:35 AM. The average heart rate was 86 beats per minute. There were 8 PACs with a burden of <0.01%. There was one supraventricular tachycardia lasting 5 beats in duration on day 3 at 12:20  AM. The fastest episode was 120 beats per minute on day 3 at 12:20 AM. Diary events were entered. There were 15 patient-triggered events.     CONCLUSIONS:  1.  The baseline rhythm was normal sinus rhythm with an average heart rate of 86 beats per minute.   2.  There were 8 episodes of PACs.   3.  There was one paroxysmal 5-beat run of SVT, rate in the 120s, regular, most consistent with an ectopic        atrial tachycardia.      Abram Sawant MD  JH/pap >                 Electronically Signed by: Marjorie Colmenares-, Other -Author on March 31, 2021 11:19:27 AM  Electronically Co-signed by: Abram Sawant MD -Reviewer on March 31, 2021 05:23:33 PM

## 2021-05-10 NOTE — PROCEDURES
"   Procedure Note      Patient Name: Timi Hernandez   Patient ID: [id number]   Sex: Male   YOB: 1980    Primary Care Provider: Tushar Rutherford MD   Referring Provider: Alla RIVERA    Visit Date: March 29, 2021    Provider: Abram Sawant MD   Location[facility]   Location Address: [address] [place] [address], [address]   [place]  [id number]   Location Phone: [phone]          FINAL REPORT   TRANSTHORACIC ECHOCARDIOGRAM REPORT    Diagnosis: Shortness of breath   Height: 5'11\" Weight: 222 B/P: 130/80 BSA: 2.2   Tech: BNS   MEASUREMENTS:  RVID (Diastole) : RVID. (NORMAL: 0.7 to 2.4 cm max)   LVID (Systole): 3.3 cm (Diastole): 4.6 cm. (NORMAL: 3.7 - 5.4 cm)   Posterior Wall Thickness (Diastole): 1.1 cm. (NORMAL: 0.8 - 1.1 cm)   Septal Thickness (Diastole): 1.1 cm. (NORMAL: 0.7 - 1.2 cm)   LAID (Systole): 2.9 cm. (NORMAL: 1.9 - 3.8 cm)   Aortic Root Diameter (Diastole): 3.4 cm. (NORMAL: 2.0 - 3.7 cm)   DOPPLER: Continuous-wave, pulse-wave, and color-flow examination of the mitral, aortic, and tricuspid valves was performed. No significant stenosis or regurgitation was identified. Doppler flow velocities were normal. E/A ratio is 1.1. DT= 190 msec. IVRT is 77 msec. E/E' is 6.   COMMENTS:  The patient underwent 2-D, M-Mode, and Doppler examination, including pulse-wave, continuous-wave, and color-flow analysis; the study is technically adequate.   FINDINGS:  AORTIC VALVE: Appeared to be normal. Trileaflet with central closure point. No evidence of any obstruction. No regurgitation.   MITRAL VALVE: Appeared to be normal. No evidence of any obstruction. No regurgitation.   TRICUSPID VALVE: Appeared to be normal.   PULMONIC VALVE: Not well seen.   LEFT ATRIUM: Appeared to be normal. No intracavity masses or clots seen. LA volume index is 15 mL/m2.   AORTIC ROOT: Appeared to be normal in size.   LEFT VENTRICLE: Appeared to have an overall normal left ventricular systolic function with a normal " EF of 55%. There is no evidence of any wall motion abnormalities. No cavitary dilatation. Normal wall thickness.   RIGHT ATRIUM: Appeared to be normal; no obvious evidence of intracavity masses or clots.   RIGHT VENTRICLE: Normal size and function.   PERICARDIUM: Unremarkable. No evidence of effusion.   INFERIOR VENA CAVA: Diameter is 1.7 cm.   Fax: 03/31/2021      CONCLUSION:  1.  Normal ejection fraction of 55%.   2.  No significant valvular heart issues.      Abram Sawant MD  /pap                 Electronically Signed by: Marjorie Colmenares-, Other -Author on March 31, 2021 01:34:46 PM  Electronically Co-signed by: Abram Sawant MD -Reviewer on March 31, 2021 05:23:22 PM

## 2021-05-10 NOTE — PROGRESS NOTES
Electrophysiology Clinic Consult     Timi Hernandez  1980 05/11/21    Tushar Rutherford MD  [address] [address] / [facility] [address]     Referring: Dr Sawant     Chief Complaint   Patient presents with   • Chest Pain   • Dizziness   • Shortness of Breath   • Irregular Heart Beat   • Edema     Chief complaint: Evaluation of possible long QT syndrome    Problem list:  1. Concern for Long QT syndrome  a. Uncle with reported long QT syndrome. Grandfather with sudden cardiac death at age 36   b. Echo 3/29/2021 ejection fraction 55%, no significant valvular heart issues.  c. Stress Test 3/29/2021 good exercise tolerance, and exercise EKG negative for ischemia, atypical chest pain and abdominal pain with exercise, no arrhythmias seen.  Study is consistent with no reversible ischemia at rest or with exercise.  This indicates a low probability of coronary artery disease in this individual  d. 3/22/2021 48-hour Holter monitor normal sinus rhythm average heart rate of 86 bpm, 8 episodes of PACs, one 5 beat run of supraventricular tachycardia in the SVT consistent with ectopic atrial tachycardia  2. RBBB  3. HTN  4. HLD   5. SANCHEZ   6. GERD       History of Present Illness:  Timi Hernandez  is a 40-year-old male presenting for consultation regarding concern for possible long QT syndrome referred by Dr Sawant. He lives in [place] and works as a  for the [employer].  He is engaged. He has 2 children ages 17 and 12.   Patient states he has an uncle  that was diagnosed with long QT syndrome.  He was also concerned given that his grandfather passed suddenly at age 36 of cardiac arrest.  He himself has had only 1 syncopal episode.  This was approximately 8 months ago and occurred after he had gone from a sitting to a standing position quickly after using the restroom  and sounds per his report to be orthostatic in nature.  He does feel occasional palpitations but he says it is usually in the setting off  anxiety. He had an episode when ile was wearing the monitor back in March and his symptoms lasted about 20 minutes- fortunately his monitor was essentially normal. When he feels palpitations and SOB he take a clonazepam and has a complete resolution of his symptoms within 20 minutes.   Throughout his life as a child, teenager, and adult he has been active in sports *(track, football, army basic training)  never had any trouble with lightheadedness or syncope and has felt well with activity.       He does not smoke or use ETOH. He drinks tea or V8 energy drinks a few times/ week.   Allergies   Allergen Reactions   • Bactrim [Sulfamethoxazole-Trimethoprim] Anaphylaxis and Rash   • Penicillins Anaphylaxis   • Colchicine Rash     PT stated body was burning                Current Outpatient Medications:   •  aspirin 81 MG EC tablet, Take 81 mg by mouth Daily., Disp: , Rfl:   •  cetirizine (zyrTEC) 10 MG tablet, Take 10 mg by mouth Daily., Disp: , Rfl:   •  clonazePAM (KlonoPIN) 1 MG tablet, Take 1 mg by mouth Daily., Disp: , Rfl:   •  icosapent ethyl (VASCEPA) 1 g capsule capsule, Take 2 g by mouth 2 (Two) Times a Day With Meals., Disp: , Rfl:   •  nadolol (Corgard) 20 MG tablet, Take 1 tablet by mouth Daily for 30 days. Take at bed time, Disp: 30 tablet, Rfl: 6    Social History     Socioeconomic History   • Marital status:      Spouse name: Not on file   • Number of children: Not on file   • Years of education: Not on file   • Highest education level: Not on file   Tobacco Use   • Smoking status: Never Smoker   • Smokeless tobacco: Never Used   Substance and Sexual Activity   • Alcohol use: Never   • Drug use: Never   • Sexual activity: Defer     Mother- CAD with stents  Maternal grandmother- CVA and CAD  Maternal uncle- 3 CVAs  Maternal Uncle Lung CA, CHF   Maternal Uncle- CHF   Son- Epilepsy     Family History   Problem Relation Age of Onset   • Heart failure Mother    • Hyperlipidemia Mother    • Heart  "attack Mother    • Hypertension Mother    • Stroke Maternal Grandfather    • Heart attack Other    • Heart disease Other    • Heart failure Other    • Hypertension Other    • Hyperlipidemia Other        REVIEW OF SYSTEMS:   CONST:  No weight loss, fever, chills, weakness or +fatigue.   HEENT:  No visual loss, blurred vision, double vision, yellow sclerae.                   No hearing loss, congestion, sore throat.   SKIN:      No rashes, urticaria, ulcers, sores.     RESP:     No shortness of breath, hemoptysis, cough, sputum.   GI:           No anorexia, nausea, vomiting, diarrhea. No abdominal pain, melena.   :         No burning on urination, hematuria or increased frequency.  ENDO:    No diaphoresis, cold or heat intolerance. No polyuria or polydipsia.   NEURO:  No headache, dizziness, syncope, paralysis, ataxia, or parasthesias.                  No change in bowel or bladder control. No history of CVA/TIA  MUSC:    No muscle, back pain, joint pain or stiffness.   HEME:    No anemia, bleeding, bruising. No history of DVT/PE.  PSYCH:  No history of depression, + anxiety    Vitals:    05/11/21 1100   BP: 124/86   BP Location: Left arm   Patient Position: Sitting   Pulse: 69   Weight: 103 kg (228 lb)   Height: 182.9 cm (72\")       Physical Exam:  GEN: Well nourished, well-developed, no acute distress  HEENT: Normocephalic, atraumatic, PERRLA, moist mucous membranes  NECK: Supple, NO JVD, no thyromegaly, no lymphadenopathy  CARD: S1S2, RRR, no murmur, gallop, rub, PMI NL  LUNGS: Clear to auscultation, normal respiratory effort  ABDOMEN: Soft, nontender, normal bowel sounds  EXTREMITIES: No gross deformities, no clubbing, cyanosis, or edema  SKIN: Warm, dry  NEURO: No focal deficits, alert and oriented x 3  PSYCHIATRIC: Normal affect and mood      I personally viewed and interpreted the patient's EKG/Telemetry/lab data            ECG 12 Lead    Date/Time: 5/11/2021 11:37 AM  Performed by: Vannesa Sharma" NICOLE  Authorized by: Vannesa Sharma APRN   Comparison: compared with previous ECG from 3/22/2021  Similar to previous ECG  Rhythm: sinus rhythm  BPM: 69  Conduction: right bundle branch block  Comments: QTC; 470 suggestive of Type 1 long QT               ICD-10-CM ICD-9-CM   1. Long Q-T syndrome  I45.81 426.82   2. Family history of long QT syndrome  Z82.49 V17.49   3. RBBB  I45.10 426.4     Assessment and Plan:    1) Family history of long QT syndrome diagnosed in his cousin  -pt will request genetic testing from his family member   -QT interval not significantly prolonged on EKG today in the setting of RBBB  -will proceed with referral to genetic testing at [facility]; may need other family members tested as well children tested depending.  On results of his genetic test.  -Obtain EKG strips from recent stress test for review for QT interval  - initiate nadalol 20 qHS for now   -Discussed treating fevers aggressively, staying hydrated, avoid QT prolonging medications (www.[employer] website info given), avoid being startled, do not swim alone     2) RBBB- on previous EKGs from Dr. Sawant     3) Anxiety    Scribed for Lang Sorensen MD by NICOLE Heller. 5/11/2021  11:57 EDT    RTC 6M    I, Lang Sorensen MD, personally performed the services face to face as described and documented by the above named individual. I have made any necessary edits and it is both accurate and complete 5/11/2021  11:57 EDT

## 2021-05-11 ENCOUNTER — OFFICE VISIT (OUTPATIENT)
Dept: CARDIOLOGY | Facility: CLINIC | Age: 41
End: 2021-05-11

## 2021-05-11 VITALS
SYSTOLIC BLOOD PRESSURE: 124 MMHG | WEIGHT: 228 LBS | HEIGHT: 72 IN | DIASTOLIC BLOOD PRESSURE: 86 MMHG | BODY MASS INDEX: 30.88 KG/M2 | HEART RATE: 69 BPM

## 2021-05-11 DIAGNOSIS — Z82.49 FAMILY HISTORY OF LONG QT SYNDROME: ICD-10-CM

## 2021-05-11 DIAGNOSIS — I45.10 RBBB: ICD-10-CM

## 2021-05-11 DIAGNOSIS — I45.81 LONG Q-T SYNDROME: Primary | ICD-10-CM

## 2021-05-11 PROCEDURE — 99244 OFF/OP CNSLTJ NEW/EST MOD 40: CPT | Performed by: INTERNAL MEDICINE

## 2021-05-11 PROCEDURE — 93000 ELECTROCARDIOGRAM COMPLETE: CPT | Performed by: NURSE PRACTITIONER

## 2021-05-11 RX ORDER — ICOSAPENT ETHYL 1000 MG/1
2 CAPSULE ORAL 2 TIMES DAILY WITH MEALS
COMMUNITY
End: 2021-07-27

## 2021-05-11 RX ORDER — NADOLOL 20 MG/1
20 TABLET ORAL DAILY
Qty: 30 TABLET | Refills: 6 | Status: SHIPPED | OUTPATIENT
Start: 2021-05-11 | End: 2021-09-14 | Stop reason: SDUPTHER

## 2021-05-11 RX ORDER — ASPIRIN 81 MG/1
81 TABLET ORAL DAILY
COMMUNITY

## 2021-05-11 RX ORDER — CETIRIZINE HYDROCHLORIDE 10 MG/1
10 TABLET ORAL DAILY
COMMUNITY
End: 2021-07-27

## 2021-05-11 RX ORDER — CLONAZEPAM 1 MG/1
1 TABLET ORAL AS NEEDED
COMMUNITY

## 2021-05-13 NOTE — PROGRESS NOTES
Progress Note      Patient Name: Timi Hernandez   Patient ID: [id number]   Sex: Male   YOB: 1980    Primary Care Provider: Tushar Rutherford MD   Referring Provider: Alla RIVERA    Visit Date: June 22, 2020    Provider: Tushar Rutherford MD   Location: [facility]   Location Address: [address] [place] [address], [address] [place] [address]  [id number]   Location Phone: [phone]          Chief Complaint     Follow up testing and labs       History Of Present Illness  Timi Hernandez is a 39 year old Other Race male who presents for evaluation and treatment of:      Pt presents for f/u.    He is also concerned about his various medical issues such has the elevated trigylcerides/and also the fatty liver and elevated LFT...He does see a GI specialist on regular basis, and has labs monitored by GI, and also [place] [facility].  he is trying to work on diet/activity.  Labs show elevatedLFT..He does see RONY Alan. He has a f/u appt in August, and has to repeat labs for her in  2 weeks.  Hx of obesity..per BMI.   Vitamin D was low.         Past Medical History  Disease Name Date Onset Notes   Allergic rhinitis, chronic --  --    Arthritis --  --    Chest pain --  --    Diabetes --  --    Elevated liver enzymes --  --    Essential hypertension --  --    Family history of colon cancer in mother --  --    Fatty liver --  --    GERD (gastroesophageal reflux disease) --  --    Heart Disease --  --    Hepatomegaly --  --    High blood pressure --  --    High cholesterol --  --    Irritable bowel syndrome --  --    Kidney stones --  --    Loose stools --  --          Past Surgical History  Procedure Name Date Notes   Cholecystecomy 2017 Dr. Ray   Hernia 1985 --    LASIK 2008 --    Toe Surgery 1993 --    Tonsillectomy 1991 --    Vasectomy 2008 --          Medication List  Name Date Started Instructions   apple cider vinegar oral  gummies   Aspir-81 81 mg oral tablet,delayed release (/EC)  take 1  tablet (81 mg) by oral route once daily   cholecalciferol (vitamin D3) 1,250 mcg (50,000 unit) oral capsule 06/22/2020 take 1 capsule by oral route q weekly   clonazepam 1 mg oral tablet  take 1 tablet by oral route As needed   fenofibric acid (choline) 135 mg oral capsule,delayed release(DR/EC) 04/13/2020 TAKE 1 CAPSULE ONCE DAILY   Zyrtec 10 mg oral tablet  take 1 tablet (10 mg) by oral route once daily         Allergy List  Allergen Name Date Reaction Notes   amoxicillin --  --  --    Bactrim --  sore throat and ulcers --    PENICILLINS --  anaphylaxis childhood       Allergies Reconciled  Family Medical History  Disease Name Relative/Age Notes   Heart Disease Mother/  Uncle/   Maternal Uncle grandparents   Lung cancer Uncle/   Maternal Uncle - 50s   Family history of colon cancer Mother/53   mother- colon polyp cancerous. age 50s.   -Father's Family History Unknown Father/   Father   Family history of stroke Mother/   Mother   Family history of heart disease Brother/  Father/   Father; Brother         Social History  Finding Status Start/Stop Quantity Notes   Alcohol Current some day --/-- --  rarely drinks, less than 1 drink per day, has been drinking for less than 1 year  drinks no   Caffeine Current some day --/-- --  drinks occasionally   lives with spouse --  --/-- --  --     --  --/-- --  --    Retired --  --/-- --  --    Second hand smoke exposure Never --/-- --  no   Tobacco Never --/-- 0.5 PPD --    Working --  --/-- --  --          Immunizations  NameDate Admin Mfg Trade Name Lot Number Route Inj VIS Given VIS Publication   InfluenzaRefused 11/14/2019 NE Not Entered  NE NE     Comments:          Review of Systems  · Constitutional  o Denies  o : fever, weight loss  · Cardiovascular  o Denies  o : pedal edema, claudication, chest pressure, palpitations  · Respiratory  o Denies  o : shortness of breath, wheezing, cough, hemoptysis, dyspnea on exertion  · Gastrointestinal  o Denies  o : nausea,  vomiting, diarrhea, constipation, abdominal pain      Vitals  Date Time BP Position Site L\R Cuff Size HR RR TEMP (F) WT  HT  BMI kg/m2 BSA m2 O2 Sat HC       06/22/2020 07:43 /85 Sitting    83 - R  97.6 238lbs 2oz 6'   32.3 2.34 98 %          Physical Examination  · Constitutional  o Appearance  o : alert, in no acute distress, well developed, well-nourished  · Head and Face  o Head  o : normocephalic, atraumatic, non tender, no palpable masses or nodules.  o Face  o : no facial lesions  · Eyes  o Vision  o : Acuity: grossly normal at distance, Conjuntivae: Normal, Sclerae white  · Respiratory  o Auscultation of Lungs  o : normal breath sounds throughout  · Cardiovascular  o Heart  o : Regular rate and rhythm, Normal S1,S2   · Psychiatric  o Mood and Affect  o : normal mood and affect          Assessment  · Elevated LFTs     790.6/R79.89  · Elevated blood pressure reading     796.2/R03.0  · Vitamin D deficiency     268.9/E55.9  · Obesity     278.00/E66.9       f/u  in 3 months.    take the vitamin D.    work on weight loss..very important..f/u in 3 months  he will try intermittent fasting, low carb, and also weight watchers.      order thyroid level for next visit    keep appts with specialists.       Plan  · Orders  o TSH [facility] ([id number]) - 790.6/R79.89, 796.2/R03.0 - 09/22/2020  o CMP [facility] ([id number]) - 790.6/R79.89, 796.2/R03.0 - 09/22/2020  o ACO-14: Influenza immunization was not administered for reasons documented () - - 06/22/2020  o ACO-39: Current medications updated and reviewed () - - 06/22/2020  · Medications  o Medications have been Reconciled  o Transition of Care or Provider Policy  · Instructions  o Electronically Identified Patient Education Materials Provided Electronically  · Disposition  o Call or Return if symptoms worsen or persist.  o Care Transition            Electronically Signed by: Tushar Rutherford MD -Author on June 22, 2020 12:31:19 PM

## 2021-05-13 NOTE — PROGRESS NOTES
Progress Note      Patient Name: Timi Hernandez   Patient ID: [id number]   Sex: Male   YOB: 1980    Primary Care Provider: Tushar Rutherford MD   Referring Provider: Alla RIVERA    Visit Date: September 28, 2020    Provider: Tushar Rutherford MD   Location: [facility]   Location Address: [address] [place] [address], [address]  [place], [place]  [id number]   Location Phone: [phone]          History Of Present Illness  Timi Hernandez is a 40 year old Other Race male who presents for evaluation and treatment of:      Pt presents for f/u.    Hx of obesity..per BMI. He is working hard, and has lost about 14 lbs since August.  Hx of fatty liver..the LFT are improving due to weight loss..He is also on vascepa now and taken off the tri    Hx of left pain  Vitamin D was low.         Past Medical History  Disease Name Date Onset Notes   Allergic rhinitis, chronic --  --    Arthritis --  --    Chest pain --  --    Diabetes --  --    Elevated liver enzymes --  --    Essential hypertension --  --    Family history of colon cancer in mother --  --    Fatty liver --  --    GERD (gastroesophageal reflux disease) --  --    Heart Disease --  --    Hepatomegaly --  --    High blood pressure --  --    High cholesterol --  --    Irritable bowel syndrome --  --    Kidney stones --  --    Loose stools --  --          Past Surgical History  Procedure Name Date Notes   Cholecystecomy 2017 Dr. Ray   Hernia 1985 --    LASIK 2008 --    Toe Surgery 1993 --    Tonsillectomy 1991 --    Vasectomy 2008 --          Medication List  Name Date Started Instructions   Aspir-81 81 mg oral tablet,delayed release (DR/EC)  take 1 tablet (81 mg) by oral route once daily   clonazepam 1 mg oral tablet  take 1 tablet by oral route As needed   Cymbalta 20 mg oral capsule,delayed release(DR/EC)  take 1 capsule (20 mg) by oral route 2 times per day   Vascepa 1 gram oral capsule  take 2 capsules (2 gram) by oral route 2  times per day with food swallowing whole. Do not chew, open, dissolve and/or crush.   Zyrtec 10 mg oral tablet  take 1 tablet (10 mg) by oral route once daily         Allergy List  Allergen Name Date Reaction Notes   amoxicillin --  --  --    Bactrim --  sore throat and ulcers --    PENICILLINS --  anaphylaxis childhood       Allergies Reconciled  Family Medical History  Disease Name Relative/Age Notes   Heart Disease Mother/  Uncle/   Maternal Uncle grandparents   Lung cancer Uncle/   Maternal Uncle - 50s   Family history of colon cancer Mother/53   mother- colon polyp cancerous. age 50s.   -Father's Family History Unknown Father/   Father   Family history of stroke Mother/   Mother   Family history of heart disease Brother/  Father/   Father; Brother         Social History  Finding Status Start/Stop Quantity Notes   Alcohol Current some day --/-- --  rarely drinks, less than 1 drink per day, has been drinking for less than 1 year  drinks no   Caffeine Current some day --/-- --  drinks occasionally   lives with spouse --  --/-- --  --     --  --/-- --  --    Retired --  --/-- --  --    Second hand smoke exposure Never --/-- --  no   Tobacco Never --/-- 0.5 PPD --    Working --  --/-- --  --          Immunizations  NameDate Admin Mfg Trade Name Lot Number Route Inj VIS Given VIS Publication   InfluenzaRefused 09/28/2020 NE Not Entered  NE NE     Comments:    InfluenzaRefused 11/14/2019 NE Not Entered  NE NE     Comments:          Review of Systems  · Constitutional  o Denies  o : fever  · Cardiovascular  o Denies  o : pedal edema, claudication, chest pressure, palpitations  · Respiratory  o Denies  o : shortness of breath, wheezing, cough, hemoptysis, dyspnea on exertion  · Gastrointestinal  o Denies  o : nausea, vomiting, diarrhea, constipation, abdominal pain      Vitals  Date Time BP Position Site L\R Cuff Size HR RR TEMP (F) WT  HT  BMI kg/m2 BSA m2 O2 Sat        09/28/2020 08:01 /83 Sitting     78 - R  97.9 222lbs 5oz 6'   30.15 2.26 98 %          Physical Examination  · Constitutional  o Appearance  o : alert, in no acute distress, well developed, well-nourished  · Head and Face  o Head  o : normocephalic, atraumatic, non tender, no palpable masses or nodules.  o Face  o : no facial lesions  · Eyes  o Vision  o : Acuity: grossly normal at distance, Conjuntivae: Normal, Sclerae white  · Respiratory  o Auscultation of Lungs  o : normal breath sounds throughout  · Cardiovascular  o Heart  o : Regular rate and rhythm, Normal S1,S2   · Psychiatric  o Mood and Affect  o : normal mood and affect              Assessment  · Fatigue     780.79/R53.83  · HLD (hyperlipidemia)     272.4/E78.5  · Obesity     278.00/E66.9  · Elevated glucose     790.29/R73.09  · Vitamin D deficiency     268.9/E55.9       f/u as directed.  he is losing weight..and labs are looking better...will monitor.          Problems Reconciled  Plan  · Orders  o Physical, Primary Care Panel (CBC, CMP, Lipid, TSH) [facility] ([id number], 25359, 70061, 07042) - 780.79/R53.83, 272.4/E78.5, 278.00/E66.9, 790.29/R73.09 - 12/28/2020  o B12 Folate levels (B12FO) - 780.79/R53.83 - 12/28/2020  o Vitamin D (25-Hydroxy) Level (19791) - 268.9/E55.9 - 12/28/2020  o ACO-14: Influenza immunization was not administered for reasons documented () - - 09/29/2020  o ACO-39: Current medications updated and reviewed (, 1159F) - - 09/29/2020  · Medications  o Medications have been Reconciled  o Transition of Care or Provider Policy  · Instructions  o Electronically Identified Patient Education Materials Provided Electronically  · Disposition  o Call or Return if symptoms worsen or persist.  o Care Transition            Electronically Signed by: Tushar Rutherford MD -Author on September 29, 2020 03:20:55 AM

## 2021-05-13 NOTE — PROGRESS NOTES
Progress Note      Patient Name: Timi Hernandez   Patient ID: [id number]   Sex: Male   YOB: 1980    Primary Care Provider: Tushar Rutherford MD   Referring Provider: Alla RIVERA    Visit Date: May 22, 2020    Provider: NICOLE Oquendo   Location: [facility]   Location Address: [address] [place] [address], [address] [place] [address]  [id number]   Location Phone: [phone]          Chief Complaint  · sore throat and penile rash  · patient states that he started Bactrim on 5/15 for a bladder issue and then started having a sore throat and penile rash. Has had some trouble swallowing. He stopped the Bactrim on Monday 5/18 and was given some Prednisone which he completed. Symptoms are some better but still present      History Of Present Illness  Timi Hernandez is a 39 year old Other Race male who presents for evaluation and treatment of: he started with diverticulitis and it got better but then he developed bladder probs of freq and pressure. he was on Bactrim and had a reaction.       Past Medical History  Disease Name Date Onset Notes   Allergic rhinitis, chronic --  --    Arthritis --  --    Chest pain --  --    Diabetes --  --    Elevated liver enzymes --  --    Essential hypertension --  --    Family history of colon cancer in mother --  --    Fatty liver --  --    GERD (gastroesophageal reflux disease) --  --    Heart Disease --  --    Hepatomegaly --  --    High blood pressure --  --    High cholesterol --  --    Irritable bowel syndrome --  --    Kidney stones --  --    Loose stools --  --          Past Surgical History  Procedure Name Date Notes   Cholecystecomy 2017 Dr. Ray   Hernia 1985 --    LASIK 2008 --    Toe Surgery 1993 --    Tonsillectomy 1991 --    Vasectomy 2008 --          Medication List  Name Date Started Instructions   apple cider vinegar oral  gummies   Aspir-81 81 mg oral tablet,delayed release (/EC)  take 1 tablet (81 mg) by oral route once daily    clonazepam 1 mg oral tablet  take 1 tablet by oral route As needed   fenofibric acid (choline) 135 mg oral capsule,delayed release(DR/EC) 04/13/2020 TAKE 1 CAPSULE ONCE DAILY   Trilipix 135 mg oral capsule,delayed release(DR/EC) 01/27/2020 take 1 capsule (135 mg) by oral route once daily   Vascepa 1 gram oral capsule  4 times a day   Zyrtec 10 mg oral tablet  take 1 tablet (10 mg) by oral route once daily         Allergy List  Allergen Name Date Reaction Notes   Bactrim --  sore throat and ulcers --    PENICILLINS --  anaphylaxis childhood         Family Medical History  Disease Name Relative/Age Notes   Heart Disease Mother/  Uncle/   Maternal Uncle grandparents   Lung cancer Uncle/   Maternal Uncle - 50s   Family history of colon cancer Mother/53   mother- colon polyp cancerous. age 50s.   -Father's Family History Unknown Father/   Father   Family history of stroke Mother/   Mother   Family history of heart disease Brother/  Father/   Father; Brother         Social History  Finding Status Start/Stop Quantity Notes   Alcohol Current some day --/-- --  rarely drinks, less than 1 drink per day, has been drinking for less than 1 year  drinks no   Caffeine Current some day --/-- --  drinks occasionally   lives with spouse --  --/-- --  --     --  --/-- --  --    Retired --  --/-- --  --    Second hand smoke exposure Never --/-- --  no   Tobacco Never --/-- 0.5 PPD --    Working --  --/-- --  --          Immunizations  NameDate Admin Mfg Trade Name Lot Number Route Inj VIS Given VIS Publication   InfluenzaRefused 11/14/2019 NE Not Entered  NE NE     Comments:          Review of Systems  · Constitutional  o Denies  o : fatigue, night sweats  · Eyes  o Denies  o : double vision, blurred vision  · HENT  o Admits  o : he isable to swallow, denies ever having any breathing problems. throat is still little sore  o Denies  o : vertigo, recent head injury  · Breasts  o Denies  o : abnormal changes in breast size,  additional breast symptoms except as noted in the HPI  · Cardiovascular  o Denies  o : chest pain, irregular heart beats  · Respiratory  o Denies  o : shortness of breath, productive cough  · Gastrointestinal  o Denies  o : nausea, vomiting  · Genitourinary  o Admits  o : urinating fine. no pain no burning. he never had trouble urinating when the problem began  o Denies  o : dysuria, urinary retention  · Integument  o Admits  o : around his penis is red, never had blisters as far as he knew. it was tender on the areas. he has put some A&D ointment around the area.it doesn't hurt now. initially it did burn some  o Denies  o : hair growth change  · Neurologic  o Denies  o : altered mental status, seizures  · Musculoskeletal  o Denies  o : joint swelling, limitation of motion  · Endocrine  o Denies  o : cold intolerance, heat intolerance  · Heme-Lymph  o Denies  o : petechiae, lymph node enlargement or tenderness  · Allergic-Immunologic  o Denies  o : frequent illnesses      Vitals  Date Time BP Position Site L\R Cuff Size HR RR TEMP (F) WT  HT  BMI kg/m2 BSA m2 O2 Sat        05/22/2020 02:40 /84 Sitting    79 - R  97.3 236lbs 0oz 6'   32.01 2.33 98 %          Physical Examination  · Constitutional  o Appearance  o : well-nourished, well developed, alert, in no acute distress  · Eyes  o Conjunctivae  o : conjunctivae normal  o Sclerae  o : sclerae white  o Pupils and Irises  o : pupils equal, round, and reactive to light and accommodation bilaterally  o Corneas  o : tear film normal, no lesions present  o Eyelids/Ocular Adnexae  o : eyelid appearance normal, no exudates present, eye moisture level normal  · Ears, Nose, Mouth and Throat  o Ears  o : external ear auricle normal, otic canal normal, TM with no reddness, effusion, retraction  o Nose  o : external normal, nasal mucosa normal, turbinates normal  o Oral Cavity  o : tongue no lesion, oral mucosa normal  o Throat  o : generalized erythemia, no exudate or  lesions  · Neck  o Inspection/Palpation  o : normal appearance, no masses or tenderness, trachea midline, no enlarged cervical or supraclavicular lymphnodes palpated  o Thyroid  o : gland size normal, nontender, no nodules or masses present on palpation, thyroid motion normal during swallowing  · Respiratory  o Respiratory Effort  o : breathing unlabored  o Inspection of Chest  o : normal appearance, no retractions  o Auscultation of Lungs  o : normal breath sounds throughout  · Cardiovascular  o Heart  o :   § Auscultation of Heart  § : regular rate and rhythm without murmur  · Skin and Subcutaneous Tissue  o General Inspection  o : area around penis red,no lesions  · Neurologic  o Mental Status Examination  o : judgement, insight intact, modd and affect appropriate  o Motor Examination  o : strength grossly intact in all four extremities  o Gait and Station  o : normal gait, able to stand without difficulty          Results  · In-Office Procedures  o Lab procedure  § IOP - Rapid Strep (49639)   § Beta Strep Gp A Culture: Negative   § Internal Control Verified?: Yes       Assessment  · Sore throat     462/J02.9  · Penile rash     607.9/R21  · Medication reaction, sequela     909.5/T50.905S      Plan  · Orders  o ACO-39: Current medications updated and reviewed () - - 05/22/2020  o Throat culture and sensitivity (02796) - 462/J02.9 - 05/22/2020  o Wound Culture with Sensitivities if indicated [facility] (41701) - 607.9/R21 - 05/22/2020  · Medications  o prednisone 20 mg oral tablet   SIG: take 1 tablet (20 mg) by oral route once daily for 3 days   DISP: (3) tablets with 0 refills  Discontinued on 05/22/2020     o Medications have been Reconciled  o Transition of Care or Provider Policy  · Instructions  o Patient was educated/instructed on their diagnosis, treatment and medications prior to discharge from the clinic today.  o pt asked about medication on area on penis but advised to keep clean and if he wants to use  his A& D he can  · Disposition  o Call or Return if symptoms worsen or persist.            Electronically Signed by: Eliz Tenorio APRN -Author on May 22, 2020 03:39:48 PM

## 2021-05-13 NOTE — PROGRESS NOTES
Progress Note      Patient Name: Timi Hernandez   Patient ID: [id number]   Sex: Male   YOB: 1980    Primary Care Provider: Tushar Rutherford MD   Referring Provider: Alla RIVERA    Visit Date: May 14, 2020    Provider: Tushar Rutherford MD   Location: [facility]   Location Address: [address] [place] [address], [address] [place] [address]  [id number]   Location Phone: [phone]          Chief Complaint     Follow up: Diverticulitis       History Of Present Illness  Timi Hernandez is a 39 year old Other Race male who presents for evaluation and treatment of:      Pt presents for eval.    He c/o pelvic/groin discomfort, urinary frequency for a couple of weeks. The urine test a couple of weeks ago was negative...but no culture was done.  He was treated for acute diverticulitis in March...given cipro/flagyl...denies any abdominal pain/nausea or loose stools. Denies any fever.   He is also concerned about his various medical issues such has the elevated trigylcerides/and also the fatty liver and elevated LFT...He does see a GI specialist on regular basis, and has labs monitored by GI, and also [facility] practice.  he is trying to work on diet/activity.  he does c/o fatigue and intermittent bouts of dizziness/vertigo.       Past Medical History  Disease Name Date Onset Notes   Allergic rhinitis, chronic --  --    Arthritis --  --    Chest pain --  --    Diabetes --  --    Elevated liver enzymes --  --    Essential hypertension --  --    Family history of colon cancer in mother --  --    Fatty liver --  --    GERD (gastroesophageal reflux disease) --  --    Heart Disease --  --    Hepatomegaly --  --    High blood pressure --  --    High cholesterol --  --    Irritable bowel syndrome --  --    Kidney stones --  --    Loose stools --  --          Past Surgical History  Procedure Name Date Notes   Cholecystecomy 2017 Dr. Ray   Hernia 1985 --    LASIK 2008 --    Toe Surgery 1993 --     Tonsillectomy 1991 --    Vasectomy 2008 --          Medication List  Name Date Started Instructions   apple cider vinegar oral  gummies   Aspir-81 81 mg oral tablet,delayed release (DR/EC)  take 1 tablet (81 mg) by oral route once daily   clonazepam 1 mg oral tablet  take 1 tablet by oral route As needed   fenofibric acid (choline) 135 mg oral capsule,delayed release(DR/EC) 04/13/2020 TAKE 1 CAPSULE ONCE DAILY   Trilipix 135 mg oral capsule,delayed release(DR/EC) 01/27/2020 take 1 capsule (135 mg) by oral route once daily   Vascepa 1 gram oral capsule  4 times a day   Zyrtec 10 mg oral tablet  take 1 tablet (10 mg) by oral route once daily         Allergy List  Allergen Name Date Reaction Notes   PENICILLINS --  anaphylaxis childhood       Allergies Reconciled  Family Medical History  Disease Name Relative/Age Notes   Heart Disease Mother/  Uncle/   Maternal Uncle grandparents   Lung cancer Uncle/   Maternal Uncle - 50s   Family history of colon cancer Mother/53   mother- colon polyp cancerous. age 50s.   -Father's Family History Unknown Father/   Father   Family history of stroke Mother/   Mother   Family history of heart disease Brother/  Father/   Father; Brother         Social History  Finding Status Start/Stop Quantity Notes   Alcohol Current some day --/-- --  rarely drinks, less than 1 drink per day, has been drinking for less than 1 year  drinks no   Caffeine Current some day --/-- --  drinks occasionally   lives with spouse --  --/-- --  --     --  --/-- --  --    Retired --  --/-- --  --    Second hand smoke exposure Never --/-- --  no   Tobacco Never --/-- 0.5 PPD --    Working --  --/-- --  --          Immunizations  NameDate Admin Mfg Trade Name Lot Number Route Inj VIS Given VIS Publication   InfluenzaRefused 11/14/2019 NE Not Entered  NE NE     Comments:          Review of Systems  · Constitutional  o Denies  o : fever, weight loss, weight gain  · Cardiovascular  o Denies  o : pedal edema,  claudication, chest pressure, palpitations  · Respiratory  o Denies  o : shortness of breath, wheezing, cough, hemoptysis, dyspnea on exertion  · Gastrointestinal  o Denies  o : nausea, vomiting, diarrhea, constipation, abdominal pain      Vitals  Date Time BP Position Site L\R Cuff Size HR RR TEMP (F) WT  HT  BMI kg/m2 BSA m2 O2 Sat HC       05/14/2020 07:39 /86 Sitting    70 - R  96.4 235lbs 2oz 6'   31.89 2.33 99 %    05/14/2020 07:39 /88 Sitting                     Physical Examination  · Constitutional  o Appearance  o : alert, in no acute distress, well developed, well-nourished  · Head and Face  o Head  o : normocephalic, atraumatic, non tender, no palpable masses or nodules.  o Face  o : no facial lesions  · Eyes  o Vision  o : Acuity: grossly normal at distance, Conjuntivae: Normal, Sclerae white  · Respiratory  o Auscultation of Lungs  o : normal breath sounds throughout  · Cardiovascular  o Heart  o : Regular rate and rhythm, Normal S1,S2   · Gastrointestinal  o Abdominal Examination  o : TTP along bladder/mid pelvis  o Liver and spleen  o : no hepatomegaly present, liver nontender to palpation, spleen not palpable  · Psychiatric  o Mood and Affect  o : normal mood and affect          Assessment  · UTI (urinary tract infection)     599.0/N39.0  · Urinary frequency     788.41/R35.0  · Pressure-type pain     780.96/R52  pelvic area  · Fatigue     780.79/R53.83  · Elevated LFTs     790.6/R79.89  · Elevated glucose     790.29/R73.09  · Routine lab draw     V72.60/Z01.89  · Prostatitis     601.9/N41.9       he may have prostatitis..start bactrim.  order urine culture.    per CT abdomen in March when he went to ED..rec to repeat CT in 3 months...so will order CT abdomen for JUne.     labs. for fatigue.       encouraged pt that the lipids/trigylcerides/and fatty liver are likely genetic in origin.. eating well and exercising keeping weight controlled will go a long way..and staying on  medications.    f/u in 1 month.     Problems Reconciled  Plan  · Orders  o Urine Culture (Clean Catch) [facility] (63004) - 599.0/N39.0, 788.41/R35.0 - 05/14/2020  o Urinalysis with Reflex Microscopy if abnormal ([facility]) (51651) - 599.0/N39.0, 788.41/R35.0 - 05/14/2020  o CBC with Manual Diff if indicated [facility] (03985, 30035) - 780.79/R53.83 - 06/14/2020  o CMP [facility] (47317) - 780.79/R53.83, 790.6/R79.89, V72.60/Z01.89 - 06/14/2020  o B12 Folate levels (B12FO) - 780.79/R53.83, V72.60/Z01.89 - 06/14/2020  o Folate (Folic Acid) Level (31711) - 780.79/R53.83, V72.60/Z01.89 - 06/14/2020  o Vitamin D (25-Hydroxy) Level (38871) - V72.60/Z01.89 - 06/14/2020  o EBV antibody panel (48321, 81408, 39340) - 780.79/R53.83, 790.6/R79.89, V72.60/Z01.89 - 06/14/2020  o CT Abdomen and Pelvis with IV Contrast [facility]; suggest Oral Prep (27114) - 599.0/N39.0, 788.41/R35.0, 780.96/R52 - 05/28/2020  o Pelvic US (56675, 60449) - 788.41/R35.0, 780.96/R52 - 05/14/2020  o ACO-39: Current medications updated and reviewed () - - 05/14/2020  o ACO-14: Influenza immunization was not administered for reasons documented () - - 05/14/2020  · Medications  o Medications have been Reconciled  o Transition of Care or Provider Policy  · Instructions  o Patient was educated/instructed on their diagnosis, treatment and medications prior to discharge from the clinic today.  o Electronically Identified Patient Education Materials Provided Electronically  · Disposition  o Call or Return if symptoms worsen or persist.  o Care Transition            Electronically Signed by: Tushar Rutherford MD -Author on May 14, 2020 11:57:38 AM

## 2021-05-13 NOTE — PROGRESS NOTES
"   Progress Note      Patient Name: Timi Hernandez   Patient ID: [id number]   Sex: Male   YOB: 1980    Primary Care Provider: Tushar Rutherford MD   Referring Provider: Alla RIVERA    Visit Date: August 17, 2020    Provider: NICOLE Conner   Location: [facility]   Location Address: [address] [place] [address]  [id number]   Location Phone: [phone]          Chief Complaint  · fatty liver      History Of Present Illness  Timi Hernandez is a 40 year old Other Race male who presents to the office today.      patient referred to Dr. Mcdaniel for second opinion  7/10/2020 AST 70, , INR 1.4    Pt states Dr Sawant changed his Trilipix to Vascepa. Pt was taking apple cider vinegar gummies and quit at our instruction in June; he also stopped Clonazepam. Pt would like to resume apple cider vinegar.   Pt had diverticulitis in March--CT + in ER, resolved w antibiotics; pt states he had similar sx in May--CT in JUNE was normal-pt states by the time he had CT he felt better; sx again last week, changed diet and after several days improved. Pain is always LLQ and radiates into bladder area, UA has been checked and neg.   Pt never tried Hyoscyamine.   Tender in epigastric area if he lays on that side and RUQ can hurt to lay on. No pain w meals. Occasional indigestion, takes Prilosec PRN and relieves, states 1-2x month.   Pt is exercising daily.      \">Patient initially presented January 2019 with elevated LFTs for 1 year and complained of loose stools.  Ultrasound from November showed hepatomegaly with diffuse fatty infiltration.  Hepatic work-up negative February 2019, ferritin was noted elevated at 869 but with a normal iron profile, Fibrosure showed F0, S3, N1.  Patient does not drink alcohol.  Loose stool since gallbladder removal, patient has tried Florajen3 and noted it aggravated stools and he improved with discontinuing Florajen3; Colestid caused constipation.  " Pt also follows w DR Sawant, hx high TG since age 28.    9/11/2019 CBC unremarkable  Liver biopsy 9/13/2019: moderate steatosis  Colonoscopy 12/30/2019 for fecal urgency and loose stools: Adequate prep, diverticula in the left side of the colon, 2 mm polyp in the cecum completely removed--adenomatous, grade 1 internal hemorrhoids, random colon biopsies negative  1/21/2020 CMP unremarkable except AST 61,     Patient was last seen February 2020 and was, intermittent abdominal cramping, occasional loose stools resolved with 1 Imodium.  Denied any alcohol, herbal supplements, vitamins.  Hyoscyamine was prescribed.  3/30/2020: AST 66,   6/12/2020 ,   6/26/2020 AST 94, --> patient referred to Dr. Mcdaniel for second opinion  7/10/2020 AST 70, , INR 1.4    Pt states Dr Sawant changed his Trilipix to Vascepa. Pt was taking apple cider vinegar gummies and quit at our instruction in June; he also stopped Clonazepam. Pt would like to resume apple cider vinegar.   Pt had diverticulitis in March--CT + in ER, resolved w antibiotics; pt states he had similar sx in May--CT in JUNE was normal-pt states by the time he had CT he felt better; sx again last week, changed diet and after several days improved. Pain is always LLQ and radiates into bladder area, UA has been checked and neg.   Pt never tried Hyoscyamine.   Tender in epigastric area if he lays on that side and RUQ can hurt to lay on. No pain w meals. Occasional indigestion, takes Prilosec PRN and relieves, states 1-2x month.   Pt is exercising daily.             Past Medical History  Allergic rhinitis, chronic; Arthritis; Chest pain; Diabetes; Elevated liver enzymes; Essential hypertension; Family history of colon cancer in mother; Fatty liver; GERD (gastroesophageal reflux disease); Heart Disease; Hepatomegaly; High blood pressure; High cholesterol; Irritable bowel syndrome; Kidney stones; Loose stools         Past Surgical  History  Cholecystecomy; Hernia; LASIK; Toe Surgery; Tonsillectomy; Vasectomy         Medication List  Name Date Started Instructions   Aspir-81 81 mg oral tablet,delayed release (DR/EC)  take 1 tablet (81 mg) by oral route once daily   clonazepam 1 mg oral tablet  take 1 tablet by oral route As needed   Cymbalta 20 mg oral capsule,delayed release(DR/EC)  take 1 capsule (20 mg) by oral route 2 times per day   Vascepa 1 gram oral capsule  take 2 capsules (2 gram) by oral route 2 times per day with food swallowing whole. Do not chew, open, dissolve and/or crush.   Zyrtec 10 mg oral tablet  take 1 tablet (10 mg) by oral route once daily         Allergy List  amoxicillin; Bactrim; PENICILLINS         Family Medical History  Heart Disease; Lung cancer; Family history of colon cancer; -Father's Family History Unknown; Family history of stroke; Family history of heart disease         Social History  Alcohol (Current some day); Caffeine (Current some day); lives with spouse; ; Retired; Second hand smoke exposure (Never); Tobacco (Never); Working         Immunizations  Name Date Admin   Influenza Refused         Review of Systems  · Constitutional  o Admits  o : good general health lately, no acute distress  · Gastrointestinal  o Denies  o : additional gastrointestinal symptoms except as noted in the HPI  · Psychiatric  o Admits  o : pleasant affect      Vitals  Date Time BP Position Site L\R Cuff Size HR RR TEMP (F) WT  HT  BMI kg/m2 BSA m2 O2 Sat        06/22/2020 07:43 /85 Sitting    83 - R  97.6 238lbs 2oz 6'   32.3 2.34 98 %    08/17/2020 08:31 /96 Sitting    76 - R  97.6 235lbs 8oz 6'   31.94 2.33           Physical Examination  · Constitutional  o Appearance  o : well developed, well-nourished, in no acute distress  · Head and Face  o Head  o :   § Inspection  § : atraumatic, normocephalic  · Eyes  o Sclerae  o : sclerae white, no sclerae icterus  · Neck  o Inspection/Palpation  o :  supple  · Respiratory  o Respiratory Effort  o : breathing unlabored  o Inspection of Chest  o : normal appearance, no retractions  · Cardiovascular  o Peripheral Vascular System  o :   § Extremities  § : no cyanosis, clubbing or edema  · Gastrointestinal  o Abdominal Examination  o : soft, nontender to palpation--epigastric area slightly tender-no LLQ pain  · Skin and Subcutaneous Tissue  o General Inspection  o : no lesions present, no rashes present  · Neurologic  o Mental Status Examination  o :   § Orientation  § : grossly oriented to person, place and time  § Speech/Language  § : communication ability within normal limits, voice quality normal, articulation of speech normal, no evidence of aphasia  § Attention  § : attention normal, concentration abilities normal  o Sensation  o : grossly intact  o Gait and Station  o :   § Gait Screening  § : normal gait  · Psychiatric  o General  o : Alert and oriented x3  o Mood and Affect  o : Mood and affect are appropriate to circumstances          Assessment  · Diverticulitis     562.11/K57.92  pt feels he has had 3 episodes this year  · Elevated LFTs     790.6/R79.89  · Fatty liver     571.8/K76.0  · Hepatomegaly     789.1/R16.0  · Loose stools     787.7/R19.5      Plan  · Orders  o Liver Panel (36622) - - 08/17/2020  o PT/INR (81965) - - 08/17/2020  · Medications  o Medications have been Reconciled  o Transition of Care or Provider Policy  · Instructions  o Encouraged to follow-up with Primary Care Provider for preventative care.  o Patient was educated/instructed on their diagnosis, treatment and medications prior to discharge from the clinic today.  o Patient instructed to seek medical attention urgently for new or worsening symptoms.  o Benefiber daily  o Call if any other episodes of abd pain  o F/U 3 month  o Await to start apple cider vinegar until we get LFTs            Electronically Signed by: NICOLE Conner -Author on August 17, 2020 09:12:22 AM

## 2021-05-13 NOTE — PROGRESS NOTES
Progress Note      Patient Name: Timi Hernandez   Patient ID: [id number]   Sex: Male   YOB: 1980    Primary Care Provider: Tushar Rutherford MD   Referring Provider: Alla RIVERA    Visit Date: September 22, 2020    Provider: Tushar Rutherford MD   Location: [facility]   Location Address: [address] [place] [address], [address]  [place], [place]  [id number]   Location Phone: [phone]          Chief Complaint     f/u wrist       History Of Present Illness  Timi Hernandez is a 40 year old Other Race male who presents for evaluation and treatment of:      Pt presents for eval.  He c/o left wrist pain for several months, but as of 2 days ago, the pain is at its worse..Pain is about a 9/10.   He initially injured the left medial wrist earlier this year, and saw a hand specialist...he received a steroid shot and felt much better. However the pain has returned. Throbbing/aching sensation. Per pt, he felt a pop at the area of the wrist/(proximal left first digit tendon) region.       Past Medical History  Disease Name Date Onset Notes   Allergic rhinitis, chronic --  --    Arthritis --  --    Chest pain --  --    Diabetes --  --    Elevated liver enzymes --  --    Essential hypertension --  --    Family history of colon cancer in mother --  --    Fatty liver --  --    GERD (gastroesophageal reflux disease) --  --    Heart Disease --  --    Hepatomegaly --  --    High blood pressure --  --    High cholesterol --  --    Irritable bowel syndrome --  --    Kidney stones --  --    Loose stools --  --          Past Surgical History  Procedure Name Date Notes   Cholecystecomy 2017 Dr. Ray   Hernia 1985 --    LASIK 2008 --    Toe Surgery 1993 --    Tonsillectomy 1991 --    Vasectomy 2008 --          Medication List  Name Date Started Instructions   Aspir-81 81 mg oral tablet,delayed release (/EC)  take 1 tablet (81 mg) by oral route once daily   clonazepam 1 mg oral tablet  take 1 tablet  by oral route As needed   Cymbalta 20 mg oral capsule,delayed release(DR/EC)  take 1 capsule (20 mg) by oral route 2 times per day   Medrol (Farzad) 4 mg oral tablets,dose pack 09/22/2020 take by oral route as directed per package instructions   Vascepa 1 gram oral capsule  take 2 capsules (2 gram) by oral route 2 times per day with food swallowing whole. Do not chew, open, dissolve and/or crush.   Zyrtec 10 mg oral tablet  take 1 tablet (10 mg) by oral route once daily         Allergy List  Allergen Name Date Reaction Notes   amoxicillin --  --  --    Bactrim --  sore throat and ulcers --    PENICILLINS --  anaphylaxis childhood       Allergies Reconciled  Family Medical History  Disease Name Relative/Age Notes   Heart Disease Mother/  Uncle/   Maternal Uncle grandparents   Lung cancer Uncle/   Maternal Uncle - 50s   Family history of colon cancer Mother/53   mother- colon polyp cancerous. age 50s.   -Father's Family History Unknown Father/   Father   Family history of stroke Mother/   Mother   Family history of heart disease Brother/  Father/   Father; Brother         Social History  Finding Status Start/Stop Quantity Notes   Alcohol Current some day --/-- --  rarely drinks, less than 1 drink per day, has been drinking for less than 1 year  drinks no   Caffeine Current some day --/-- --  drinks occasionally   lives with spouse --  --/-- --  --     --  --/-- --  --    Retired --  --/-- --  --    Second hand smoke exposure Never --/-- --  no   Tobacco Never --/-- 0.5 PPD --    Working --  --/-- --  --          Immunizations  NameDate Admin Mfg Trade Name Lot Number Route Inj VIS Given VIS Publication   InfluenzaRefused 11/14/2019 NE Not Entered  NE NE     Comments:          Review of Systems  · Constitutional  o Denies  o : night sweats  · Eyes  o Denies  o : double vision, blurred vision  · HENT  o Denies  o : vertigo, recent head injury  · Breasts  o Denies  o : abnormal changes in breast size, additional  breast symptoms except as noted in the HPI  · Cardiovascular  o Denies  o : chest pain, irregular heart beats  · Respiratory  o Denies  o : shortness of breath, productive cough  · Gastrointestinal  o Denies  o : nausea, vomiting  · Genitourinary  o Denies  o : dysuria, urinary retention  · Integument  o Denies  o : hair growth change, new skin lesions  · Neurologic  o Denies  o : altered mental status, seizures  · Musculoskeletal  o Denies  o : joint swelling, limitation of motion  · Endocrine  o Denies  o : cold intolerance, heat intolerance  · Heme-Lymph  o Denies  o : petechiae, lymph node enlargement or tenderness  · Allergic-Immunologic  o Denies  o : frequent illnesses      Vitals  Date Time BP Position Site L\R Cuff Size HR RR TEMP (F) WT  HT  BMI kg/m2 BSA m2 O2 Sat HC       09/22/2020 03:17 /84 Sitting    98 - R  97.2 223lbs 5oz 6'   30.29 2.27 98 %          Physical Examination  · Constitutional  o Appearance  o : alert, in no acute distress, well developed, well-nourished  · Head and Face  o Head  o : normocephalic, atraumatic, non tender, no palpable masses or nodules.  o Face  o : no facial lesions  · Eyes  o Vision  o : Acuity: grossly normal at distance, Conjuntivae: Normal, Sclerae white  · Respiratory  o Auscultation of Lungs  o : normal breath sounds throughout  · Cardiovascular  o Heart  o : Regular rate and rhythm, Normal S1,S2   · Musculoskeletal  o Spine  o : left proximal tendon first digit/left medial region pain  · Psychiatric  o Mood and Affect  o : normal mood and affect              Assessment  · Screening for depression     V79.0/Z13.31  · Wrist pain, acute, left     719.43/M25.532  · Hand pain, left     729.5/M79.642       will order imaging.  steroid shot and steroids given.      f/u as directed.       Plan  · Orders  o Positive screen for clinical depression using a standardized tool and a follow-up plan documented () - - 09/22/2020  o Xray wrist 3v left H Preferred View  ([id number]-IU) - 719.43/M25.532, 729.5/M79.642 - 09/22/2020  o Xray hand left 2 views [facility] ([id number]-UM) - 719.43/M25.532, 729.5/M79.642 - 09/22/2020  o MRI wrist left wo contrast (20698) - 719.43/M25.532, 729.5/M79.642 - 09/22/2020  o Depo-Medrol 80 () - 719.43/M25.532, 729.5/M79.642 - 09/22/2020   Injection - Depo Medrol 80 mg; Dose: 80 mg; Site: Left Deltoid; Route: intramuscular; Date: 09/22/2020 16:21:06; Exp: 08/01/2021; Lot: [id number]; Mfg: N/A; TradeName: methylprednisolone; [facility] [facility] [place] Administered By: Danita Summers MA; Comment: pt tolerated well and left office in stable condition  o IM/SQ - Injection Fee [facility] (47694) - 719.43/M25.532, 729.5/M79.642 - 09/22/2020  o ACO-39: Current medications updated and reviewed () - - 09/24/2020  o ACO-14: Influenza immunization was not administered for reasons documented () - - 09/24/2020  · Medications  o Medications have been Reconciled  o Transition of Care or Provider Policy  · Instructions  o Patient was educated/instructed on their diagnosis, treatment and medications prior to discharge from the clinic today.  o Electronically Identified Patient Education Materials Provided Electronically  · Disposition  o Call or Return if symptoms worsen or persist.  o Care Transition            Electronically Signed by: Tushar Rutherford MD -Author on September 24, 2020 05:32:05 AM

## 2021-05-13 NOTE — PROGRESS NOTES
Progress Note      Patient Name: Timi Hernandez   Patient ID: [id number]   Sex: Male   YOB: 1980    Primary Care Provider: Tushar Rutherford MD   Referring Provider: Alla RIVERA    Visit Date: September 24, 2020    Provider: Abram Sawant MD   Location[facility]   Location Address: [address] [place] [address], [facility]   [place]  [id number]   Location Phone: [phone]          Chief Complaint     Dyslipidemia.       History Of Present Illness  REFERRING CARE PROVIDER: Alla RIVERA   Timi Hernandez is a 40-year-old gentleman with dyslipidemia and SANCHEZ who has been doing well. He has had about a 10-pound weight loss through diet and exercise.   PAST MEDICAL HISTORY: Dyslipidemia; Fatty liver; GERD; Hypertension; Irritable bowel syndrome; SANCHEZ (Non-alcoholic steatohepatitis).   FAMILY HISTORY: Positive for hypertension and heart disease. Negative for diabetes mellitus.   PSYCHOSOCIAL HISTORY: Denies alcohol or tobacco use. Admits mood changes and depression.   CURRENT MEDICATIONS: Vascepa 2 grams b.i.d.; Cymbalta 20 mg daily; clonazepam 1 mg p.r.n.; aspirin 81 mg daily; Zyrtec 10 mg daily.       Review of Systems  · Cardiovascular  o Admits  o : swelling (feet, ankles, hands)  o Denies  o : palpitations (fast, fluttering, or skipping beats), shortness of breath while walking or lying flat, chest pain or angina pectoris   · Respiratory  o Denies  o : chronic or frequent cough, asthma or wheezing      Vitals  Date Time BP Position Site L\R Cuff Size HR RR TEMP (F) WT  HT  BMI kg/m2 BSA m2 O2 Sat FR L/min FiO2 HC       09/24/2020 10:37 /80 Sitting    54 - R   220lbs 0oz 6'   29.84 2.25       09/24/2020 10:37 /88 Sitting                       Physical Examination  · Constitutional  o Appearance  o : Awake, alert, in no acute distress.   · Eyes  o Conjunctivae  o : Normal.  · Ears, Nose, Mouth and Throat  o Oral Cavity  o :   § Oral Mucosa  § :  Normal.  · Neck  o Inspection/Palpation  o : No JVD. Good carotid upstroke. No thyromegaly.  · Respiratory  o Respiratory  o : Good respiratory effort. Clear to percussion and auscultation.  · Cardiovascular  o Heart  o :   § Auscultation of Heart  § : S1, S2 normal. Regular rate and rhythm without murmurs, gallops, or rubs.  o Peripheral Vascular System  o :   § Extremities  § : Good femoral and pedal pulses. No pedal edema.  · Gastrointestinal  o Abdominal Examination  o : Soft. No tenderness or masses felt. No hepatosplenomegaly. Abdominal aorta is not palpable.  · Labs  o Labs  o : Creatinine 1.08. LDL 30. . Triglycerides 384. ALT 79. AST 43.          Assessment     ASSESSMENT & PLAN:     1.  Dyslipidemia, improvement in his triglycerides, as well as his LDL.  Continue with current lifestyle changes        and medications.    2.  Non-alcoholic steatohepatitis.  Patient with improved LFTs due to lifestyle and change of his medication.        Abram Sawant MD  JH:vm             Electronically Signed by: Valerie Stafford-, Other -Author on September 30, 2020 10:48:13 AM  Electronically Co-signed by: Abram Sawant MD -Reviewer on October 4, 2020 10:44:13 AM

## 2021-05-13 NOTE — PROGRESS NOTES
Quick Note      Patient Name: Timi Hernandez   Patient ID: [id number]   Sex: Male   YOB: 1980    Primary Care Provider: Tushar Rutherford MD   Referring Provider: Alla RIVERA    Visit Date: May 19, 2020    Provider: Tushar Rutherford MD   Location: [facility]   Location Address: [address] [place] [address], [address]  [id number]   Location Phone: [phone]          History Of Present Illness  TELEHEALTH TELEPHONE VISIT  Chief Complaint: allergic reaction to bactrim   Timi Hernandez is a 39 year old Other Race male who is presenting for evaluation via telehealth telephone visit. Verbal consent obtained before beginning visit.   Provider spent 15 min minutes with the patient during telehealth visit.   The following staff were present during this visit: Dr. Rutherford, telehealth via telephone conversation   Past Medical History/Overview of Patient Symptoms     He was placed on bactrim for prostatitis. He had a reaction...rash, and also, throat soreness/sensation of swelling of the throat. he took the med for 5 days..stopped the med yesterday..since stopping it yesterday, this morning he is feeling better. the throat is not as sore, and the rash seems to be improving.       Vitals     telehealth       Physical Examination     telehealth           Assessment  · Allergy to sulfa drugs     V14.2/Z88.2  · Rash     782.1/R21  · Sore throat     462/J02.9       bactrim will go on allergy list.    prednisone.    if worsens..he will have to be seen in the office for eval.       Plan  · Orders  o Physician Telephone Evaluation, 11-20 minutes (72390) - - 05/19/2020  o ACO-13: Fall Risk Screening with no falls in past year or only one fall without injury in the past year (1101F) - - 05/19/2020  o ACO-14: Influenza immunization administered or previously received () - - 05/19/2020  o ACO-39: Current medications updated and reviewed () - - 05/19/2020  · Medications  o Medications have  been Reconciled  · Instructions  o Plan Of Care:   o Electronically Identified Patient Education Materials Provided Electronically  · Disposition  o Call or Return if symptoms worsen or persist.  o Care Transition            Electronically Signed by: Tushar Rutherford MD -Author on May 22, 2020 06:38:23 AM

## 2021-05-13 NOTE — PROGRESS NOTES
Progress Note      Patient Name: Timi Hernandez   Patient ID: [id number]   Sex: Male   YOB: 1980    Primary Care Provider: Tushar Rutherford MD   Referring Provider: Alla RIVERA    Visit Date: November 17, 2020    Provider: NICOLE Conner   Location: [facility]   Location Address: [address] [place] [address]  [id number]   Location Phone: [phone]          Chief Complaint  · Follow up   · diarrhea       History Of Present Illness  Video Conferencing Visit  Timi Hernandez is a 40 year old Other Race male who is presenting for evaluation via video conferencing via FORMAT OF VIDEO VISIT. Verbal consent obtained before beginning visit.   The following staff were present during this visit: Charlie Ralph MA; Ilana Louis MOFLAVIO   Timi Hernandez is a 40 year old Other Race male who presents to the office today.      patient referred to Dr. Mcdaniel for second opinion  7/10/2020 AST 70, , INR 1.4  8/20/2020: INR 1.0, alk phos 40, AST 65, , bilirubin 0.55  [facility]:   alk phos 45  ALT 47  AST  28    Patient was last seen August 2020, he reported having diverticulitis in March with CT confirming in the ER symptoms resolved within her symptoms in May, CT in June was negative, patient states by the time he had CT his symptoms were resolved, he reported having symptoms mildly in August that resolved with changing his diet.  Pain is always left lower quadrant.    Pt states he's down to 220 (18# wt loss), walking and eating better, some fasting for wt loss  Pt states his appt w DR Mcdaniel is now in Dec, states it has been r/s several times.   Pt states he's having 1-2 stools in AM, about 5 x day total, he feels he has more stools after taking Vascepa. PT states he has gotten constipated w Imodium and Hyoscyamine has also caused constipation and abd pain. Pt states he did have LLQ pain for 3 days recently,  "states pain resolved yesterday.\">Patient initially presented January 2019 with elevated LFTs for 1 year and complained of loose stools.  Ultrasound from November showed hepatomegaly with diffuse fatty infiltration.  Hepatic work-up negative February 2019, ferritin was noted elevated at 869 but with a normal iron profile, Fibrosure showed F0, S3, N1.  Patient does not drink alcohol.  Loose stool since gallbladder removal, patient has tried Florajen3 and noted it aggravated stools and he improved with discontinuing Florajen3; Colestid caused constipation.  Pt also follows w DR Sawant, hx high TG since age 28.    Liver biopsy 9/13/2019: moderate steatosis  Colonoscopy 12/30/2019 for fecal urgency and loose stools: Adequate prep, diverticula in the left side of the colon, 2 mm polyp in the cecum completely removed--adenomatous, grade 1 internal hemorrhoids, random colon biopsies negative    LFT TREND:  1/21/20 : AST 61,   3/30/2020: AST 66,   6/12/2020 ,   6/26/2020 AST 94, --> patient referred to Dr. Mcdaniel for second opinion  7/10/2020 AST 70, , INR 1.4  8/20/2020: INR 1.0, alk phos 40, AST 65, , bilirubin 0.55  [facility]:   alk phos 45  ALT 47  AST  28    Patient was last seen August 2020, he reported having diverticulitis in March with CT confirming in the ER symptoms resolved within her symptoms in May, CT in June was negative, patient states by the time he had CT his symptoms were resolved, he reported having symptoms mildly in August that resolved with changing his diet.  Pain is always left lower quadrant.    Pt states he's down to 220 (18# wt loss), walking and eating better, some fasting for wt loss  Pt states his appt w DR Mcdaniel is now in Dec, states it has been r/s several times.   Pt states he's having 1-2 stools in AM, about 5 x day total, he feels he has more stools after taking Vascepa. PT states he has gotten " constipated w Imodium and Hyoscyamine has also caused constipation and abd pain. Pt states he did have LLQ pain for 3 days recently, states pain resolved yesterday.       Past Medical History  Allergic rhinitis, chronic; Arthritis; Chest pain; Diabetes; Elevated liver enzymes; Essential hypertension; Family history of colon cancer in mother; Fatty liver; GERD (gastroesophageal reflux disease); Heart Disease; Hepatomegaly; High blood pressure; High cholesterol; Irritable bowel syndrome; Kidney stones; Loose stools         Past Surgical History  Cholecystecomy; Hernia; LASIK; Toe Surgery; Tonsillectomy; Vasectomy         Medication List  Aspir-81 81 mg oral tablet,delayed release (DR/EC); clonazepam 2 mg oral tablet; Cymbalta 30 mg oral capsule,delayed release(DR/EC); Vascepa 1 gram oral capsule; Zyrtec 10 mg oral tablet         Allergy List  amoxicillin; Bactrim; PENICILLINS         Family Medical History  Heart Disease; Lung cancer; Family history of colon cancer; -Father's Family History Unknown; Family history of stroke; Family history of heart disease         Social History  Alcohol (Current some day); Caffeine (Current some day); lives with spouse; ; Retired; Second hand smoke exposure (Never); Tobacco (Never); Working         Immunizations  Name Date Admin   Influenza Refused   Influenza Refused         Review of Systems  · Constitutional  o Admits  o : good general health lately, no acute distress  · Gastrointestinal  o Denies  o : additional gastrointestinal symptoms except as noted in the HPI  · Psychiatric  o Admits  o : pleasant affect      Physical Examination  · Constitutional  o Appearance  o : well developed, well-nourished, in no acute distress  · Head and Face  o Head  o :   § Inspection  § : atraumatic, normocephalic  · Eyes  o Sclerae  o : sclerae white, no sclerae icterus  · Neck  o Inspection/Palpation  o : supple  · Respiratory  o Respiratory Effort  o : breathing unlabored  o Inspection  of Chest  o : normal appearance, no retractions  · Skin and Subcutaneous Tissue  o General Inspection  o : no lesions present, no rashes present  · Neurologic  o Mental Status Examination  o :   § Orientation  § : grossly oriented to person, place and time  § Speech/Language  § : communication ability within normal limits, voice quality normal, articulation of speech normal, no evidence of aphasia  § Attention  § : attention normal, concentration abilities normal  · Psychiatric  o General  o : Alert and oriented x3  o Mood and Affect  o : Mood and affect are appropriate to circumstances          Assessment  · Elevated LFTs     790.6/R79.89  improved  · Fatty liver     571.8/K76.0  · Loose stools     787.7/R19.5      Plan  · Orders  o PT/INR (21062) - 571.8/K76.0 - 01/17/2021  o RUQ US (right upper quadrant ultrasound) (63111) - 571.8/K76.0 - 12/17/2020  · Medications  o hyoscyamine sulfate 0.125 mg sublingual tablet, sublingual   SIG: place 1 tablet under tongue by translingual route every 4 to 6 hours for 30 days PRN diarrhea/abd pain   DISP: (90) Tablet with 1 refills  Prescribed on 11/17/2020     · Instructions  o Encouraged to follow-up with Primary Care Provider for preventative care.  o Patient was educated/instructed on their diagnosis, treatment and medications prior to discharge from the clinic today.  o Patient instructed to seek medical attention urgently for new or worsening symptoms.  o F/U PRN. Advised pt to call if any GI symptoms such as change in bowel pattern, abd pain, wt loss, or blood in stool.   o f/u Jeremiah--cont w wt loss  o Try Hyoscyamine again but less often, 1 QOD and see if helps loose stools.   o Call if LLQ pain returns            Electronically Signed by: NICOLE Conner -Author on November 17, 2020 09:19:07 AM

## 2021-05-14 VITALS
SYSTOLIC BLOOD PRESSURE: 148 MMHG | HEART RATE: 54 BPM | HEIGHT: 72 IN | BODY MASS INDEX: 29.8 KG/M2 | DIASTOLIC BLOOD PRESSURE: 80 MMHG | WEIGHT: 220 LBS

## 2021-05-14 VITALS
HEIGHT: 72 IN | TEMPERATURE: 97.2 F | HEART RATE: 98 BPM | OXYGEN SATURATION: 98 % | SYSTOLIC BLOOD PRESSURE: 132 MMHG | WEIGHT: 223.31 LBS | DIASTOLIC BLOOD PRESSURE: 84 MMHG | BODY MASS INDEX: 30.25 KG/M2

## 2021-05-14 VITALS
SYSTOLIC BLOOD PRESSURE: 128 MMHG | RESPIRATION RATE: 18 BRPM | WEIGHT: 223.19 LBS | TEMPERATURE: 97.3 F | HEIGHT: 72 IN | HEART RATE: 86 BPM | OXYGEN SATURATION: 97 % | DIASTOLIC BLOOD PRESSURE: 80 MMHG | BODY MASS INDEX: 30.23 KG/M2

## 2021-05-14 VITALS
HEART RATE: 82 BPM | WEIGHT: 222 LBS | HEIGHT: 71 IN | SYSTOLIC BLOOD PRESSURE: 130 MMHG | DIASTOLIC BLOOD PRESSURE: 80 MMHG | BODY MASS INDEX: 31.08 KG/M2

## 2021-05-14 VITALS
SYSTOLIC BLOOD PRESSURE: 149 MMHG | HEIGHT: 72 IN | WEIGHT: 224.5 LBS | TEMPERATURE: 98.2 F | HEART RATE: 77 BPM | BODY MASS INDEX: 30.41 KG/M2 | DIASTOLIC BLOOD PRESSURE: 88 MMHG

## 2021-05-14 VITALS
WEIGHT: 222.31 LBS | HEIGHT: 72 IN | OXYGEN SATURATION: 98 % | BODY MASS INDEX: 30.11 KG/M2 | DIASTOLIC BLOOD PRESSURE: 83 MMHG | SYSTOLIC BLOOD PRESSURE: 133 MMHG | HEART RATE: 78 BPM | TEMPERATURE: 97.9 F

## 2021-05-14 VITALS
WEIGHT: 225 LBS | SYSTOLIC BLOOD PRESSURE: 144 MMHG | HEART RATE: 73 BPM | BODY MASS INDEX: 31.5 KG/M2 | DIASTOLIC BLOOD PRESSURE: 93 MMHG | HEIGHT: 71 IN

## 2021-05-14 NOTE — PROGRESS NOTES
"   Progress Note      Patient Name: Timi Hernandez   Patient ID: [id number]   Sex: Male   YOB: 1980    Primary Care Provider: Tushar Rutherford MD   Referring Provider: Alla RIVERA    Visit Date: March 29, 2021    Provider: Abram Sawant MD   Location: [facility]   Location Address: [address] [place] [address], [facility]   [place], [place]  [id number]   Location Phone: [phone]          Chief Complaint  · QT prolongation       History Of Present Illness  REFERRING CARE PROVIDER: Alla RIVERA   Timi Hernandez is a 40-year-old with a previous history of QT prolongation seen on prior EKG with a family history of some QT prolongation. He has had occasional palpitations and fluttering episodes and is following back up today for a stress test and echocardiogram.   PAST MEDICAL HISTORY: Dyslipidemia; Fatty liver; GERD; Hypertension; Irritable bowel syndrome; SANCHEZ (Non-alcoholic steatohepatitis).   PSYCHOSOCIAL HISTORY: Denies alcohol or tobacco use.   CURRENT MEDICATIONS: Medication list was reviewed and is as documented.      ALLERGIES: Penicillin.       Review of Systems  · Cardiovascular  o Admits  o : palpitations (fast, fluttering, or skipping beats), shortness of breath while walking or lying flat, chest pain or angina pectoris   o Denies  o : swelling (feet, ankles, hands)  · Respiratory  o Denies  o : chronic or frequent cough      Vitals  Date Time BP Position Site L\R Cuff Size HR RR TEMP (F) WT  HT  BMI kg/m2 BSA m2 O2 Sat FR L/min FiO2 HC       03/29/2021 03:11 /93 Sitting    73 - R   224lbs 16oz 5'  11\" 31.38 2.26       03/29/2021 03:11 /87 Sitting    74 - R                   Physical Examination  · Constitutional  o Appearance  o : Awake, alert, in no acute distress.   · Eyes  o Conjunctivae  o : Normal.  · Ears, Nose, Mouth and Throat  o Oral Cavity  o :   § Oral Mucosa  § : Normal.  · Neck  o Inspection/Palpation  o : No JVD. Good carotid upstroke. No " thyromegaly.  · Respiratory  o Respiratory  o : Good respiratory effort. Clear to percussion and auscultation.  · Cardiovascular  o Heart  o :   § Auscultation of Heart  § : S1, S2 normal. Regular rate and rhythm without murmurs, gallops, or rubs.  o Peripheral Vascular System  o :   § Extremities  § : Good femoral and pedal pulses. No pedal edema.  · Gastrointestinal  o Abdominal Examination  o : Soft. No tenderness or masses felt. No hepatosplenomegaly. Abdominal aorta is not palpable.     Echocardiogram today showed normal ejection fraction of 55% with no wall motion abnormalities.     Today's stress test showed a good exercise capacity. No EKG evidence of ischemia.           Assessment     ASSESSMENT AND PLAN:  1.  QT prolongation seen incidentally on baseline EKG. Due to family history issues patient has been sent for evaluation for electrophysiology. Twenty-hour Holter monitor results still pending. The patient was counseled on avoidance of QT prolonging drugs including antibiotics  such as Zithromax and antihistamines.     2.  Hyperlipidemia. Patient's triglycerides have improved. Continue with current medications.       Abram Sawant MD  /pap             Electronically Signed by: Marjorie Colmenares-, Other -Author on March 31, 2021 03:21:58 PM  Electronically Co-signed by: Abram Sawant MD -Reviewer on March 31, 2021 05:23:11 PM

## 2021-05-14 NOTE — PROGRESS NOTES
Progress Note      Patient Name: Timi Hernandez   Patient ID: [id number]   Sex: Male   YOB: 1980    Primary Care Provider: Tushar Rutherford MD   Referring Provider: Alla RIVERA    Visit Date: December 28, 2020    Provider: Tushar Rutherford MD   Location: [facility]   Location Address: [address] [place] [address], [address] [place] [address]  [id number]   Location Phone: [phone]          Chief Complaint     3 month follow up       History Of Present Illness  Timi Hernandez is a 40 year old Other Race male who presents for evaluation and treatment of:      Pt presents for f/u.    Hx of elevated LFT.. He has seen NICOLE Conner, and also a Gastro specialist in [facility]. Per pt, the gastro specialist in [place] thinks it may be the ferritin levels that are so high has to do with the LFT.  However today, the LFT is normal.     The b12 is low.   The vitamin D is low.       He c/o back pain..daily for the past week, after lifting heavy objects into his car.   Pain is daily, lower back about 7/10, achiness sensation.      Hx of obesity..per BMI. He did lose about 15 lbs since last visit.. Proud of pt.       Past Medical History  Disease Name Date Onset Notes   Allergic rhinitis, chronic --  --    Arthritis --  --    Chest pain --  --    Diabetes --  --    Elevated liver enzymes --  --    Essential hypertension --  --    Family history of colon cancer in mother --  --    Fatty liver --  --    GERD (gastroesophageal reflux disease) --  --    Heart Disease --  --    Hepatomegaly --  --    High blood pressure --  --    High cholesterol --  --    Irritable bowel syndrome --  --    Kidney stones --  --    Loose stools --  --          Past Surgical History  Procedure Name Date Notes   Cholecystecomy 2017 Dr. Flor Vides 1985 --    LASIK 2008 --    Toe Surgery 1993 --    Tonsillectomy 1991 --    Vasectomy 2008 --          Medication List  Name Date Started Instructions    Aspir-81 81 mg oral tablet,delayed release (DR/EC)  take 1 tablet (81 mg) by oral route once daily   clonazepam 2 mg oral tablet  take 1 tablet (2 mg) by oral route 2 times per day   Cymbalta 30 mg oral capsule,delayed release(DR/EC)  take 1 capsule (30 mg) by oral route once daily   hyoscyamine sulfate 0.125 mg sublingual tablet, sublingual 11/17/2020 place 1 tablet under tongue by translingual route every 4 to 6 hours for 30 days PRN diarrhea/abd pain   Medrol (Farzad) 4 mg oral tablets,dose pack 12/28/2020 take by oral route as directed per package instructions   methocarbamol 750 mg oral tablet 12/28/2020 take 1 tablet (750 mg) by oral route 3 times per day prn   Vascepa 1 gram oral capsule 10/09/2020 TAKE 2 CAPSULES TWICE DAILY   Zyrtec 10 mg oral tablet  take 1 tablet (10 mg) by oral route once daily         Allergy List  Allergen Name Date Reaction Notes   amoxicillin --  --  --    Bactrim --  sore throat and ulcers --    PENICILLINS --  anaphylaxis childhood       Allergies Reconciled  Family Medical History  Disease Name Relative/Age Notes   Heart Disease Mother/  Uncle/   Maternal Uncle grandparents   Lung cancer Uncle/   Maternal Uncle - 50s   Family history of colon cancer Mother/53   mother- colon polyp cancerous. age 50s.   -Father's Family History Unknown Father/   Father   Family history of stroke Mother/   Mother   Family history of heart disease Brother/  Father/   Father; Brother         Social History  Finding Status Start/Stop Quantity Notes   Alcohol Current some day --/-- --  rarely drinks, less than 1 drink per day, has been drinking for less than 1 year  drinks no   Caffeine Current some day --/-- --  drinks occasionally   lives with spouse --  --/-- --  --     --  --/-- --  --    Retired --  --/-- --  --    Second hand smoke exposure Never --/-- --  no   Tobacco Never --/-- 0.5 PPD --    Working --  --/-- --  --          Immunizations  NameDate Admin Mfg Trade Name Lot Number Route  Inj VIS Given VIS Publication   InfluenzaRefused 09/28/2020 NE Not Entered  NE NE     Comments:          Review of Systems  · Constitutional  o Denies  o : fever, weight loss  · Cardiovascular  o Denies  o : pedal edema, claudication, chest pressure, palpitations  · Respiratory  o Denies  o : shortness of breath, wheezing, cough, hemoptysis, dyspnea on exertion  · Gastrointestinal  o Denies  o : nausea, vomiting, diarrhea, constipation, abdominal pain      Vitals  Date Time BP Position Site L\R Cuff Size HR RR TEMP (F) WT  HT  BMI kg/m2 BSA m2 O2 Sat FR L/min FiO2 HC       12/28/2020 08:36 /80 Sitting    86 - R 18 97.3 223lbs 3oz 6'   30.27 2.27 97 %  21%          Physical Examination  · Constitutional  o Appearance  o : alert, in no acute distress, well developed, well-nourished  · Head and Face  o Head  o : normocephalic, atraumatic, non tender, no palpable masses or nodules.  o Face  o : no facial lesions  · Eyes  o Vision  o : Acuity: grossly normal at distance, Conjuntivae: Normal, Sclerae white  · Respiratory  o Auscultation of Lungs  o : normal breath sounds throughout  · Cardiovascular  o Heart  o : Regular rate and rhythm, Normal S1,S2   · Musculoskeletal  o Spine  o : TTP lumbar spine..paraspinous  · Psychiatric  o Mood and Affect  o : normal mood and affect          Assessment  · Fatigue     780.79/R53.83  · HLD (hyperlipidemia)     272.4/E78.5  · Elevated glucose     790.29/R73.09  · Vitamin D deficiency     268.9/E55.9  · Back pain     724.5/M54.9  · Elevated LFTs     790.6/R79.89       muscle relaxer.  steroids.  ibuprofen.    if no improvement he will call back.      low dose vitamin D. he has as hx of kidney stones.     b12 shot monthly.       keep appt with Alla.. He may need to see a Hematologist in future.            Plan  · Orders  o Physical, Primary Care Panel (CBC, CMP, Lipid, TSH) [facility] (98306, 33251, 38123, 31408) - 780.79/R53.83, 272.4/E78.5, 790.29/R73.09, 268.9/E55.9 -  05/28/2021  o B12 Folate levels (B12FO) - 780.79/R53.83, 272.4/E78.5, 790.29/R73.09, 268.9/E55.9 - 05/28/2021  o Vitamin D (25-Hydroxy) Level (12311) - 780.79/R53.83, 272.4/E78.5, 790.29/R73.09, 268.9/E55.9 - 05/28/2021  o IM/SQ - Injection Fee [facility] (38838) - 780.79/R53.83, 724.5/M54.9 - 12/28/2020  o Depo-Medrol 80 () - 724.5/M54.9 - 12/28/2020   Injection - Depo Medrol 80 mg; Dose: 80 mg; Site: Right Gluteus; Route: intramuscular; Date: 12/28/2020 09:36:54; Exp: 09/01/2021; Lot: [id number]; Mfg: PolyMedix/OneTrueFan; TradeName: methylprednisolone; [place] [facility] Administered By: Ayla Miller MA; Comment: pt tolerated well  o Vitamin B12 Injection () - 780.79/R53.83 - 12/28/2020   Injection - Vitamin B12; Dose: 1mL; Site: Left Deltoid; Route: intramuscular; Date: 12/28/2020 09:36:07; Exp: 05/01/2022; Lot: [id number]; Mfg: eFuneral; TradeName: cyanocobalamin (vitamin B-12); [place] [facility]; Administered By: Ayla Miller MA; Comment: pt tolerated well  o ACO-14: Influenza immunization was not administered for reasons documented () - - 12/28/2020  o ACO-39: Current medications updated and reviewed (, 1159F) - - 12/28/2020  · Medications  o Medications have been Reconciled  o Transition of Care or Provider Policy  · Instructions  o Electronically Identified Patient Education Materials Provided Electronically  · Disposition  o Call or Return if symptoms worsen or persist.  o Care Transition            Electronically Signed by: Tushar Rutherford MD -Author on December 28, 2020 12:41:42 PM

## 2021-05-14 NOTE — PROGRESS NOTES
"   Progress Note      Patient Name: Timi Hernandez   Patient ID: [id number]   Sex: Male   YOB: 1980    Primary Care Provider: Tushar Rutherford MD   Referring Provider: Alla RIVERA    Visit Date: January 11, 2021    Provider: NICOLE Conner   Location[facility] [address] [facility]   Location Address: [address] [place] [address]  [id number]   Location Phone: [phone]          Chief Complaint  · Follow up       History Of Present Illness  Timi Hernandez is a 40 year old Other Race male who presents to the office today.      patient referred to Dr. Mcdaniel for second opinion  7/10/2020 AST 70, , INR 1.4  8/20/2020: INR 1.0, alk phos 40, AST 65, , bilirubin 0.55  [facility]:   alk phos 45  ALT 47  AST  28    Dec LFTs are normal, RUQ US still shows fatty liver in Dec.     Pt last seen Nov 2020, reported he had diverticulitis in March, went to ER, CT confirmed. Had sx again in May but when CT done in June states was negative and sx were resolved by then. Reported losing 18# intentionally.     Pt states he is doing well. Pt states he does have explosive BM's at times, he has equated this to sugary foods or drinks. He has kept off the weight he lost.   Pt saw Dr Mcdaniel, he suggested we further check in to hx of elev Ferritin. Since then, LFTs have greatly improved.      \">Patient initially presented January 2019 with elevated LFTs for 1 year and complained of loose stools.  Ultrasound from November showed hepatomegaly with diffuse fatty infiltration.  Hepatic work-up negative February 2019, ferritin was noted elevated at 869 but with a normal iron profile, Fibrosure showed F0, S3, N1.  Patient does not drink alcohol.  Loose stool since gallbladder removal, patient has tried Florajen3 and noted it aggravated stools and he improved with discontinuing Florajen3; Colestid caused constipation.  Pt also follows w DR Sawant, hx high TG " since age 28.    Liver biopsy 9/13/2019: moderate steatosis  Colonoscopy 12/30/2019 for fecal urgency and loose stools: Adequate prep, diverticula in the left side of the colon, 2 mm polyp in the cecum completely removed--adenomatous, grade 1 internal hemorrhoids, random colon biopsies negative    LFT TREND:  1/21/20 : AST 61,   3/30/2020: AST 66,   6/12/2020 ,   6/26/2020 AST 94, --> patient referred to Dr. Mcdaniel for second opinion  7/10/2020 AST 70, , INR 1.4  8/20/2020: INR 1.0, alk phos 40, AST 65, , bilirubin 0.55  [facility]:   alk phos 45  ALT 47  AST  28    Dec LFTs are normal, RUQ US still shows fatty liver in Dec.     Pt last seen Nov 2020, reported he had diverticulitis in March, went to ER, CT confirmed. Had sx again in May but when CT done in June states was negative and sx were resolved by then. Reported losing 18# intentionally.     Pt states he is doing well. Pt states he does have explosive BM's at times, he has equated this to sugary foods or drinks. He has kept off the weight he lost.   Pt saw Dr Mcdaniel, he suggested we further check in to hx of elev Ferritin. Since then, LFTs have greatly improved.             Past Medical History  Allergic rhinitis, chronic; Arthritis; Chest pain; Diabetes; Elevated liver enzymes; Essential hypertension; Family history of colon cancer in mother; Fatty liver; GERD (gastroesophageal reflux disease); Heart Disease; Hepatomegaly; High blood pressure; High cholesterol; Irritable bowel syndrome; Kidney stones; Loose stools         Past Surgical History  Cholecystecomy; Hernia; LASIK; Toe Surgery; Tonsillectomy; Vasectomy         Medication List  Name Date Started Instructions   Aspir-81 81 mg oral tablet,delayed release (DR/EC)  take 1 tablet (81 mg) by oral route once daily   clonazepam 2 mg oral tablet  take 1 tablet (2 mg) by oral route 2 times per day   Cymbalta 30 mg oral  capsule,delayed release(DR/EC)  take 1 capsule (30 mg) by oral route once daily   hyoscyamine sulfate 0.125 mg sublingual tablet, sublingual 11/17/2020 place 1 tablet under tongue by translingual route every 4 to 6 hours for 30 days PRN diarrhea/abd pain   Vascepa 1 gram oral capsule 10/09/2020 TAKE 2 CAPSULES TWICE DAILY   Zyrtec 10 mg oral tablet  take 1 tablet (10 mg) by oral route once daily         Allergy List  amoxicillin; Bactrim; PENICILLINS         Family Medical History  Heart Disease; Lung cancer; Family history of colon cancer; -Father's Family History Unknown; Family history of stroke; Family history of heart disease         Social History  Alcohol (Current some day); Caffeine (Current some day); lives with spouse; ; Retired; Second hand smoke exposure (Never); Tobacco (Never); Working         Immunizations  Name Date Admin   Influenza Refused   Influenza Refused         Review of Systems  · Constitutional  o Admits  o : good general health lately, no acute distress  · Gastrointestinal  o Denies  o : additional gastrointestinal symptoms except as noted in the HPI  · Psychiatric  o Admits  o : pleasant affect      Vitals  Date Time BP Position Site L\R Cuff Size HR RR TEMP (F) WT  HT  BMI kg/m2 BSA m2 O2 Sat FR L/min FiO2 HC       01/11/2021 02:13 /88 Sitting    77 - R  98.2 224lbs 8oz 6'   30.45 2.27             Physical Examination  · Constitutional  o Appearance  o : well developed, well-nourished, in no acute distress  · Head and Face  o Head  o :   § Inspection  § : atraumatic, normocephalic  · Eyes  o Sclerae  o : sclerae white, no sclerae icterus  · Neck  o Inspection/Palpation  o : supple  · Respiratory  o Respiratory Effort  o : breathing unlabored  o Inspection of Chest  o : normal appearance, no retractions  · Cardiovascular  o Peripheral Vascular System  o :   § Extremities  § : no cyanosis, clubbing or edema  · Gastrointestinal  o Abdominal Examination  o : soft, nontender to  palpation  · Skin and Subcutaneous Tissue  o General Inspection  o : no lesions present, no rashes present  · Neurologic  o Mental Status Examination  o :   § Orientation  § : grossly oriented to person, place and time  § Speech/Language  § : communication ability within normal limits, voice quality normal, articulation of speech normal, no evidence of aphasia  § Attention  § : attention normal, concentration abilities normal  o Sensation  o : grossly intact  o Gait and Station  o :   § Gait Screening  § : normal gait  · Psychiatric  o General  o : Alert and oriented x3  o Mood and Affect  o : Mood and affect are appropriate to circumstances          Assessment  · Diarrhea     787.91/R19.7  · Elevated LFTs     790.6/R79.89  much improved  · Fatty liver     571.8/K76.0  · Irritable bowel syndrome     564.1/K58.9  · Elevated ferritin     790.6/R79.89      Plan  · Orders  o PT/INR (74321) - 571.8/K76.0 - 06/11/2021  o Liver Profile (44599) - 571.8/K76.0 - 06/11/2021  o CBC with Auto Diff [facility] (62192) - 571.8/K76.0 - 06/11/2021  o RUQ US (right upper quadrant ultrasound) (68940) - 571.8/K76.0 - 06/11/2021  o Iron panel (iron, TIBC, transferrin saturation) (09730, 76383, 32799) - - 01/11/2021  o Ferritin (95257) - - 01/11/2021  · Medications  o Medications have been Reconciled  o Transition of Care or Provider Policy  · Instructions  o Encouraged to follow-up with Primary Care Provider for preventative care.  o Patient was educated/instructed on their diagnosis, treatment and medications prior to discharge from the clinic today.  o Patient instructed to seek medical attention urgently for new or worsening symptoms.  o Patient counseled on low carb diet. Continue w wt loss.   o Follow Up in 6 months.            Electronically Signed by: NICOLE Conner -Author on January 11, 2021 06:05:30 PM

## 2021-05-14 NOTE — PROGRESS NOTES
Progress Note      Patient Name: Timi Hernandez   Patient ID: [id number]   Sex: Male   YOB: 1980    Primary Care Provider: Tushar Rutherford MD   Referring Provider: Alla RIVERA    Visit Date: 2021    Provider: Abram Sawant MD   Location: [facility]   Location Address: [address] [place] [address], [facility]   LIBBY Garcia  [id number]   Location Phone: [phone]          History Of Present Illness  REFERRING CARE PROVIDER: Alla RIVERA and Lang Sorensen   Timi Hernandez is a 40-year-old male with a previous history of dyslipidemia, elevated triglycerides, and SANCHEZ who came in today due to concerns over family history of QT prolongation. The patient has been suffering from palpitations at times, but no significant sustained tachycardic problems. He does state that he has increased anxiety as well which he feels may have precipitated some of this. With exertion at times he does feel like he gets presyncopal and this does not usually take a lot of exertion for him to feel that way. He did have one episode of syncope that occurred about 6 months ago that occurred after he was standing up after using the restroom, but otherwise he has had no significant syncopal spells throughout his life. The patient reports that he was told by him mom that there was a cousin who had recently been tested and found to have a mutation in his poor QT prolongation. There was a family history of a grandfather who  suddenly at age 36.   PAST MEDICAL HISTORY: Dyslipidemia; Fatty liver; GERD; Hypertension; Irritable bowel syndrome; SANCHEZ (Non-alcoholic steatohepatitis).   PSYCHOSOCIAL HISTORY: Denies alcohol or tobacco use.   CURRENT MEDICATIONS: Vascepa 2 grams b.i.d.; Clonazepam 1 mg b.i.d.; aspirin 81 mg daily; Zyrtec daily; fiber daily; vitamin D3 daily; vitamin B12 injection monthly.      ALLERGIES: Penicillin.       Review of Systems  · Cardiovascular  o Admits  o : palpitations  "(fast, fluttering, or skipping beats), swelling (feet, ankles, hands), shortness of breath while walking or lying flat, chest pain or angina pectoris   · Respiratory  o Admits  o : asthma or wheezing  o Denies  o : chronic or frequent cough      Vitals  Date Time BP Position Site L\R Cuff Size HR RR TEMP (F) WT  HT  BMI kg/m2 BSA m2 O2 Sat FR L/min FiO2        03/22/2021 10:51 /80 Sitting    82 - R   222lbs 0oz 5'  11\" 30.96 2.25             Physical Examination  · Constitutional  o Appearance  o : Awake, alert, in no acute distress.   · Eyes  o Conjunctivae  o : Normal.  · Ears, Nose, Mouth and Throat  o Oral Cavity  o :   § Oral Mucosa  § : Normal.  · Neck  o Inspection/Palpation  o : No JVD. Good carotid upstroke. No thyromegaly.  · Respiratory  o Respiratory  o : Good respiratory effort. Clear to percussion and auscultation.  · Cardiovascular  o Heart  o :   § Auscultation of Heart  § : S1, S2 normal. Regular rate and rhythm without murmurs, gallops, or rubs.  o Peripheral Vascular System  o :   § Extremities  § : Good femoral and pedal pulses. Trace lower extremity edema.  · Gastrointestinal  o Abdominal Examination  o : Soft. No tenderness or masses felt. No hepatosplenomegaly. Abdominal aorta is not palpable.  · EKG  o EKG  o : Done in the office today.  o Results  o : Normal sinus rhythm with right axis deviation, right bundle branch block, and QTc of 501.  o Comparison  o : This was longer than his previous QTc which had been in the 460 range on prior EKGs in the office.  · Labs  o Labs  o : Potassium 4.1, creatinine 0.9, LDL 99, HDL 32, [id number].           Assessment     ASSESSMENT AND PLAN:  1.  QT prolongation seen on the EKG today in the office with only one prior syncopal episode which sounded orthostatic in nature in the last six months. It sounds like the patient may have a family history although distantly with mutation of QT prolongation as well as a grandparent with sudden cardiac " arrest. Recommend a 24-hour Holter monitor and echocardiogram, and a plain treadmill stress test. Will also refer to electrophysiologist to see if any further workup or evaluation would be beneficial.     2.  Dyslipidemia.  The patient has had improvement in triglycerides. Continue with his current medications. Encouraged exercise and dietary modification.      Abram Sawant MD  [facility]/pap                Electronically Signed by: Marjorie Colmenares-, Other -Author on March 26, 2021 09:35:04 AM  Electronically Co-signed by: Abram Sawant MD -Reviewer on March 26, 2021 11:11:41 AM

## 2021-05-15 VITALS
WEIGHT: 234.31 LBS | OXYGEN SATURATION: 97 % | TEMPERATURE: 98.3 F | HEIGHT: 72 IN | HEART RATE: 93 BPM | SYSTOLIC BLOOD PRESSURE: 150 MMHG | BODY MASS INDEX: 31.74 KG/M2 | DIASTOLIC BLOOD PRESSURE: 86 MMHG

## 2021-05-15 VITALS
OXYGEN SATURATION: 98 % | TEMPERATURE: 98.1 F | HEART RATE: 88 BPM | BODY MASS INDEX: 32.04 KG/M2 | WEIGHT: 236.56 LBS | DIASTOLIC BLOOD PRESSURE: 78 MMHG | HEIGHT: 72 IN | SYSTOLIC BLOOD PRESSURE: 128 MMHG

## 2021-05-15 VITALS
WEIGHT: 230 LBS | DIASTOLIC BLOOD PRESSURE: 90 MMHG | SYSTOLIC BLOOD PRESSURE: 130 MMHG | HEIGHT: 72 IN | HEART RATE: 78 BPM | BODY MASS INDEX: 31.15 KG/M2

## 2021-05-15 VITALS
WEIGHT: 236.12 LBS | BODY MASS INDEX: 31.98 KG/M2 | HEART RATE: 73 BPM | SYSTOLIC BLOOD PRESSURE: 150 MMHG | DIASTOLIC BLOOD PRESSURE: 87 MMHG | HEIGHT: 72 IN

## 2021-05-15 VITALS
HEIGHT: 72 IN | BODY MASS INDEX: 31.29 KG/M2 | WEIGHT: 231 LBS | HEART RATE: 84 BPM | SYSTOLIC BLOOD PRESSURE: 116 MMHG | DIASTOLIC BLOOD PRESSURE: 86 MMHG

## 2021-05-15 VITALS
HEIGHT: 72 IN | OXYGEN SATURATION: 98 % | WEIGHT: 231.37 LBS | HEART RATE: 68 BPM | SYSTOLIC BLOOD PRESSURE: 130 MMHG | TEMPERATURE: 98 F | BODY MASS INDEX: 31.34 KG/M2 | DIASTOLIC BLOOD PRESSURE: 74 MMHG

## 2021-05-15 VITALS
SYSTOLIC BLOOD PRESSURE: 151 MMHG | WEIGHT: 235.5 LBS | DIASTOLIC BLOOD PRESSURE: 96 MMHG | TEMPERATURE: 97.6 F | HEART RATE: 76 BPM | BODY MASS INDEX: 31.9 KG/M2 | HEIGHT: 72 IN

## 2021-05-15 VITALS
WEIGHT: 238.12 LBS | TEMPERATURE: 97.6 F | BODY MASS INDEX: 32.25 KG/M2 | HEART RATE: 83 BPM | SYSTOLIC BLOOD PRESSURE: 135 MMHG | HEIGHT: 72 IN | OXYGEN SATURATION: 98 % | DIASTOLIC BLOOD PRESSURE: 85 MMHG

## 2021-05-15 VITALS
TEMPERATURE: 97.3 F | HEART RATE: 79 BPM | OXYGEN SATURATION: 98 % | BODY MASS INDEX: 31.97 KG/M2 | DIASTOLIC BLOOD PRESSURE: 84 MMHG | WEIGHT: 236 LBS | HEIGHT: 72 IN | SYSTOLIC BLOOD PRESSURE: 150 MMHG

## 2021-05-15 VITALS
DIASTOLIC BLOOD PRESSURE: 86 MMHG | OXYGEN SATURATION: 99 % | HEIGHT: 72 IN | HEART RATE: 70 BPM | WEIGHT: 235.12 LBS | SYSTOLIC BLOOD PRESSURE: 156 MMHG | BODY MASS INDEX: 31.85 KG/M2 | TEMPERATURE: 96.4 F

## 2021-05-15 VITALS
SYSTOLIC BLOOD PRESSURE: 127 MMHG | HEIGHT: 72 IN | WEIGHT: 236.25 LBS | DIASTOLIC BLOOD PRESSURE: 81 MMHG | HEART RATE: 77 BPM | BODY MASS INDEX: 32 KG/M2

## 2021-05-16 VITALS
SYSTOLIC BLOOD PRESSURE: 118 MMHG | HEIGHT: 71 IN | DIASTOLIC BLOOD PRESSURE: 86 MMHG | HEART RATE: 82 BPM | BODY MASS INDEX: 32.62 KG/M2 | WEIGHT: 233 LBS

## 2021-05-16 VITALS
SYSTOLIC BLOOD PRESSURE: 145 MMHG | TEMPERATURE: 96.8 F | HEIGHT: 72 IN | OXYGEN SATURATION: 98 % | WEIGHT: 230.25 LBS | DIASTOLIC BLOOD PRESSURE: 85 MMHG | HEART RATE: 99 BPM | BODY MASS INDEX: 31.19 KG/M2

## 2021-05-16 VITALS
TEMPERATURE: 97.5 F | SYSTOLIC BLOOD PRESSURE: 132 MMHG | HEART RATE: 88 BPM | BODY MASS INDEX: 32.52 KG/M2 | OXYGEN SATURATION: 98 % | DIASTOLIC BLOOD PRESSURE: 82 MMHG | WEIGHT: 232.25 LBS | HEIGHT: 71 IN

## 2021-05-16 VITALS
WEIGHT: 231 LBS | HEART RATE: 84 BPM | SYSTOLIC BLOOD PRESSURE: 138 MMHG | DIASTOLIC BLOOD PRESSURE: 94 MMHG | BODY MASS INDEX: 32.34 KG/M2 | HEIGHT: 71 IN

## 2021-05-16 VITALS
RESPIRATION RATE: 17 BRPM | WEIGHT: 234.56 LBS | OXYGEN SATURATION: 97 % | TEMPERATURE: 98.8 F | HEART RATE: 98 BPM | BODY MASS INDEX: 32.84 KG/M2 | SYSTOLIC BLOOD PRESSURE: 135 MMHG | DIASTOLIC BLOOD PRESSURE: 81 MMHG | HEIGHT: 71 IN

## 2021-05-16 VITALS
DIASTOLIC BLOOD PRESSURE: 80 MMHG | HEART RATE: 109 BPM | HEIGHT: 72 IN | SYSTOLIC BLOOD PRESSURE: 133 MMHG | BODY MASS INDEX: 31.83 KG/M2 | WEIGHT: 235 LBS

## 2021-05-16 VITALS — DIASTOLIC BLOOD PRESSURE: 76 MMHG | SYSTOLIC BLOOD PRESSURE: 136 MMHG

## 2021-05-16 VITALS
HEIGHT: 72 IN | BODY MASS INDEX: 31.97 KG/M2 | HEART RATE: 74 BPM | SYSTOLIC BLOOD PRESSURE: 128 MMHG | DIASTOLIC BLOOD PRESSURE: 86 MMHG | WEIGHT: 236 LBS

## 2021-05-16 VITALS
SYSTOLIC BLOOD PRESSURE: 144 MMHG | BODY MASS INDEX: 31.56 KG/M2 | DIASTOLIC BLOOD PRESSURE: 84 MMHG | HEIGHT: 72 IN | HEART RATE: 83 BPM | WEIGHT: 233 LBS

## 2021-05-22 ENCOUNTER — TRANSCRIBE ORDERS (OUTPATIENT)
Dept: ADMINISTRATIVE | Facility: HOSPITAL | Age: 41
End: 2021-05-22

## 2021-05-22 DIAGNOSIS — K76.0 FATTY LIVER: Primary | ICD-10-CM

## 2021-05-28 ENCOUNTER — HOSPITAL ENCOUNTER (OUTPATIENT)
Dept: LAB | Facility: HOSPITAL | Age: 41
Discharge: HOME | End: 2021-05-28
Attending: FAMILY MEDICINE

## 2021-05-28 ENCOUNTER — OFFICE VISIT CONVERTED (OUTPATIENT)
Dept: FAMILY MEDICINE CLINIC | Facility: CLINIC | Age: 41
End: 2021-05-28
Attending: FAMILY MEDICINE

## 2021-05-28 LAB
25(OH)D3 SERPL-MCNC: 35.1 NG/ML (ref 30–100)
ALBUMIN SERPL-MCNC: 4.9 G/DL (ref 3.5–5)
ALBUMIN SERPL-MCNC: 4.9 G/DL (ref 3.5–5)
ALBUMIN/GLOB SERPL: 1.7 {RATIO} (ref 1.4–2.6)
ALP SERPL-CCNC: 43 U/L (ref 53–128)
ALP SERPL-CCNC: 44 U/L (ref 53–128)
ALT SERPL-CCNC: 60 U/L (ref 10–40)
ALT SERPL-CCNC: 61 U/L (ref 10–40)
ANION GAP SERPL CALC-SCNC: 15 MMOL/L (ref 8–19)
AST SERPL-CCNC: 35 U/L (ref 15–50)
AST SERPL-CCNC: 36 U/L (ref 15–50)
BASOPHILS # BLD AUTO: 0.02 10*3/UL (ref 0–0.2)
BASOPHILS NFR BLD AUTO: 0.3 % (ref 0–3)
BILIRUB SERPL-MCNC: 0.55 MG/DL (ref 0.2–1.3)
BILIRUB SERPL-MCNC: 0.56 MG/DL (ref 0.2–1.3)
BUN SERPL-MCNC: 15 MG/DL (ref 5–25)
BUN/CREAT SERPL: 15 {RATIO} (ref 6–20)
CALCIUM SERPL-MCNC: 9.6 MG/DL (ref 8.7–10.4)
CHLORIDE SERPL-SCNC: 98 MMOL/L (ref 99–111)
CHOLEST SERPL-MCNC: 222 MG/DL (ref 107–200)
CHOLEST/HDLC SERPL: 7.4 {RATIO} (ref 3–6)
CONV ABS IMM GRAN: 0.03 10*3/UL (ref 0–0.2)
CONV BILI, CONJUGATED: <0.2 MG/DL (ref 0–0.6)
CONV CO2: 28 MMOL/L (ref 22–32)
CONV IMMATURE GRAN: 0.4 % (ref 0–1.8)
CONV TOTAL PROTEIN: 7.8 G/DL (ref 6.3–8.2)
CONV TOTAL PROTEIN: 8 G/DL (ref 6.3–8.2)
CONV UNCONJUGATED BILIRUBIN: 0.4 MG/DL (ref 0–1.1)
CREAT UR-MCNC: 0.98 MG/DL (ref 0.7–1.2)
DEPRECATED RDW RBC AUTO: 38.8 FL (ref 35.1–43.9)
EOSINOPHIL # BLD AUTO: 0.12 10*3/UL (ref 0–0.7)
EOSINOPHIL # BLD AUTO: 1.6 % (ref 0–7)
ERYTHROCYTE [DISTWIDTH] IN BLOOD BY AUTOMATED COUNT: 13.1 % (ref 11.6–14.4)
FOLATE SERPL-MCNC: 11.5 NG/ML (ref 4.8–20)
GFR SERPLBLD BASED ON 1.73 SQ M-ARVRAT: >60 ML/MIN/{1.73_M2}
GLOBULIN UR ELPH-MCNC: 2.9 G/DL (ref 2–3.5)
GLUCOSE SERPL-MCNC: 96 MG/DL (ref 70–99)
HCT VFR BLD AUTO: 42.5 % (ref 42–52)
HDLC SERPL-MCNC: 30 MG/DL (ref 40–60)
HGB BLD-MCNC: 14.7 G/DL (ref 14–18)
INR PPP: 1.01 (ref 2–3)
LDLC SERPL CALC-MCNC: 92 MG/DL (ref 70–100)
LYMPHOCYTES # BLD AUTO: 3.41 10*3/UL (ref 1–5)
LYMPHOCYTES NFR BLD AUTO: 45.2 % (ref 20–45)
MCH RBC QN AUTO: 28.5 PG (ref 27–31)
MCHC RBC AUTO-ENTMCNC: 34.6 G/DL (ref 33–37)
MCV RBC AUTO: 82.5 FL (ref 80–96)
MONOCYTES # BLD AUTO: 0.49 10*3/UL (ref 0.2–1.2)
MONOCYTES NFR BLD AUTO: 6.5 % (ref 3–10)
NEUTROPHILS # BLD AUTO: 3.47 10*3/UL (ref 2–8)
NEUTROPHILS NFR BLD AUTO: 46 % (ref 30–85)
NRBC CBCN: 0 % (ref 0–0.7)
OSMOLALITY SERPL CALC.SUM OF ELEC: 283 MOSM/KG (ref 273–304)
PLATELET # BLD AUTO: 207 10*3/UL (ref 130–400)
PMV BLD AUTO: 10.8 FL (ref 9.4–12.4)
POTASSIUM SERPL-SCNC: 4.5 MMOL/L (ref 3.5–5.3)
PROTHROMBIN TIME: 11 S (ref 9.4–12)
RBC # BLD AUTO: 5.15 10*6/UL (ref 4.7–6.1)
SODIUM SERPL-SCNC: 136 MMOL/L (ref 135–147)
TRIGL SERPL-MCNC: 551 MG/DL (ref 40–150)
TSH SERPL-ACNC: 1.54 M[IU]/L (ref 0.27–4.2)
VIT B12 SERPL-MCNC: 765 PG/ML (ref 211–911)
WBC # BLD AUTO: 7.54 10*3/UL (ref 4.8–10.8)

## 2021-05-30 ENCOUNTER — DOCUMENTATION (OUTPATIENT)
Dept: CARDIOLOGY | Facility: CLINIC | Age: 41
End: 2021-05-30

## 2021-05-30 NOTE — PROGRESS NOTES
I reviewed the rhythm strips during the patient's GXT.  There was appropriate QT shortening during exercise.

## 2021-06-05 NOTE — PROGRESS NOTES
Progress Note      Patient Name: Timi Hernandez   Patient ID: [id number]   Sex: Male   YOB: 1980    Primary Care Provider: Tushar Rutherford MD   Referring Provider: Alla RIVERA    Visit Date: May 28, 2021    Provider: Tushar Rutherford MD   Location: [facility]   Location Address: [address] [place] [address], [address] [place] [address]  [id number]   Location Phone: [phone]          Chief Complaint     5 month follow up       History Of Present Illness  Timi Hernandez is a 40 year old Other Race male who presents for evaluation and treatment of:      Pt presents for f/u.    Hx of elevated LFT.. He has seen NICOLE Conner, and also a Gastro specialist in [facility]. Hx of chronic intermitent diarrhea due to irritable bowel syndrome.    Hx of low b12. He is on b12 shots monthly, but still feels fatigue.    He was recently placed on pristiq by his psychiatrist.      Hx of prolonged QT syndrome.. He is now on nadolol, by Dr Smith, Cardiologist. He also sees Dr. Sawant.    labs are due.        Hx of obesity       Past Medical History  Disease Name Date Onset Notes   Allergic rhinitis, chronic --  --    Arthritis --  --    Chest pain --  --    Diabetes --  --    Elevated liver enzymes --  --    Essential hypertension --  --    Family history of colon cancer in mother --  --    Fatty liver --  --    GERD (gastroesophageal reflux disease) --  --    Heart Disease --  --    Hepatomegaly --  --    High blood pressure --  --    High cholesterol --  --    Irritable bowel syndrome --  --    Kidney Stones --  --    Loose stools --  --          Past Surgical History  Procedure Name Date Notes   Cholecystecomy 2017 Dr. Ray   Hernia 1985 --    LASIK 2008 --    Toe Surgery 1993 --    Tonsillectomy 1991 --    Vasectomy 2008 --          Medication List  Name Date Started Instructions   allopurinol 300 mg oral tablet 04/25/2021 take 1 tablet (300 mg) by oral route once daily for 30  days. start after the colcrys has been completed.   Aspir-81 81 mg oral tablet,delayed release (DR/EC)  take 1 tablet (81 mg) by oral route once daily   clonazepam 2 mg oral tablet  take 1 tablet (2 mg) by oral route 2 times per day   Colcrys 0.6 mg oral tablet 04/25/2021 take 1 tablet (0.6 mg) by oral route once daily for 14 days   cyanocobalamin (vitamin B-12) 1,000 mcg/mL injection solution 04/20/2021 inject 1 milliliter (1,000 mcg) by intramuscular route once a month   nadolol 20 mg oral tablet  take 1 tablet (20 mg) by oral route once at bedtime   Pristiq 50 mg oral tablet extended release 24 hr  take 1 tablet (50 mg) by oral route once daily for 30 days   Vascepa 1 gram oral capsule 05/26/2021 TAKE 2 CAPSULES TWICE DAILY   Zyrtec 10 mg oral tablet  take 1 tablet (10 mg) by oral route once daily         Allergy List  Allergen Name Date Reaction Notes   amoxicillin --  --  --    Bactrim --  sore throat and ulcers --    PENICILLINS --  anaphylaxis childhood       Allergies Reconciled  Family Medical History  Disease Name Relative/Age Notes   Heart Disease Mother/  Uncle/   Maternal Uncle grandparents   Lung cancer Uncle/   Maternal Uncle - 50s   Family history of colon cancer Mother/53   mother- colon polyp cancerous. age 50s.   -Father's Family History Unknown Father/   Father   Family history of stroke Mother/   Mother   Family history of heart disease Brother/  Father/   Father; Brother         Social History  Finding Status Start/Stop Quantity Notes   Alcohol Current some day --/-- --  rarely drinks, less than 1 drink per day, has been drinking for less than 1 year  drinks no   Caffeine Current some day --/-- --  drinks occasionally   lives with spouse --  --/-- --  --     --  --/-- --  --    Retired --  --/-- --  --    Second hand smoke exposure Never --/-- --  no   Tobacco Never --/-- 0.5 PPD --    Working --  --/-- --  --          Immunizations  NameDate Admin Mfg Trade Name Lot Number Route Inj  "VIS Given VIS Publication   VIC Davis03/31/2021 NE Not Entered  NE NE 05/28/2021    Comments:    InfluenzaRefused 09/28/2020 NE Not Entered  NE NE     Comments:          Review of Systems  · Constitutional  o Denies  o : fever, weight loss  · Cardiovascular  o Denies  o : pedal edema, claudication, chest pressure, palpitations  · Respiratory  o Denies  o : shortness of breath, wheezing, cough, hemoptysis, dyspnea on exertion  · Gastrointestinal  o Denies  o : nausea, vomiting, diarrhea, constipation, abdominal pain      Vitals  Date Time BP Position Site L\R Cuff Size HR RR TEMP (F) WT  HT  BMI kg/m2 BSA m2 O2 Sat FR L/min FiO2 HC       05/28/2021 08:44 /78 Sitting    84 - R 18 97.9 227lbs 5oz 5'  11\" 31.7 2.27 96 %  21%          Physical Examination  · Constitutional  o Appearance  o : alert, in no acute distress, well developed, well-nourished  · Head and Face  o Head  o : normocephalic, atraumatic, non tender, no palpable masses or nodules.  o Face  o : no facial lesions  · Eyes  o Vision  o : Acuity: grossly normal at distance, Conjuntivae: Normal, Sclerae white  · Respiratory  o Auscultation of Lungs  o : normal breath sounds throughout  · Cardiovascular  o Heart  o : Regular rate and rhythm, Normal S1,S2   · Psychiatric  o Mood and Affect  o : normal mood and affect          Assessment  · Fatigue     780.79/R53.83  · HLD (hyperlipidemia)     272.4/E78.5  · Elevated glucose     790.29/R73.09  · Vitamin D deficiency     268.9/E55.9  · Irritable bowel syndrome     564.1/K58.9       f/u as directed    trial of OTC floragen, and metamucil.    labs.    go up to twice a month on b12 shots... he may have a family member/friend that can give him the shots.       Plan  · Orders  o Physical, Primary Care Panel (CBC, CMP, Lipid, TSH) [facility] (61767, 05975, 70045, 82715) - 780.79/R53.83, 272.4/E78.5, 790.29/R73.09, 268.9/E55.9 - 11/28/2021  o B12 Folate levels (B12FO) - 780.79/R53.83, 272.4/E78.5, 790.29/R73.09, " 268.9/E55.9 - 11/28/2021  o Vitamin D (25-Hydroxy) Level (13025) - 780.79/R53.83, 272.4/E78.5, 790.29/R73.09, 268.9/E55.9 - 11/28/2021  o ACO-14: Influenza immunization was not administered for reasons documented () - - 05/28/2021  o ACO-39: Current medications updated and reviewed (1159F, ) - - 05/28/2021  · Medications  o Medications have been Reconciled  o Transition of Care or Provider Policy  · Instructions  o Electronically Identified Patient Education Materials Provided Electronically  · Disposition  o Call or Return if symptoms worsen or persist.  o Care Transition            Electronically Signed by: Tushar Rutherford MD -Author on May 28, 2021 12:38:44 PM

## 2021-06-09 ENCOUNTER — TELEPHONE (OUTPATIENT)
Dept: FAMILY MEDICINE CLINIC | Facility: CLINIC | Age: 41
End: 2021-06-09

## 2021-06-09 NOTE — TELEPHONE ENCOUNTER
CALL ABOUT PHENOFIBRE WAS TO BE SENT OVER TO THE PHARMACY [facility] [address] CALL PATIENT ABOUT THIS PLEASE @ [phone]

## 2021-06-11 ENCOUNTER — HOSPITAL ENCOUNTER (OUTPATIENT)
Dept: ULTRASOUND IMAGING | Facility: HOSPITAL | Age: 41
Discharge: HOME | End: 2021-06-11
Admitting: NURSE PRACTITIONER

## 2021-06-11 DIAGNOSIS — K76.0 FATTY LIVER: ICD-10-CM

## 2021-06-11 PROCEDURE — 76705 ECHO EXAM OF ABDOMEN: CPT

## 2021-06-11 RX ORDER — FENOFIBRATE 160 MG/1
160 TABLET ORAL DAILY
Qty: 30 TABLET | Refills: 6 | Status: SHIPPED | OUTPATIENT
Start: 2021-06-11 | End: 2021-11-17 | Stop reason: ALTCHOICE

## 2021-06-23 ENCOUNTER — CLINICAL SUPPORT (OUTPATIENT)
Dept: FAMILY MEDICINE CLINIC | Facility: CLINIC | Age: 41
End: 2021-06-23

## 2021-06-23 DIAGNOSIS — E53.8 LOW VITAMIN B12 LEVEL: Primary | ICD-10-CM

## 2021-06-23 PROCEDURE — 96372 THER/PROPH/DIAG INJ SC/IM: CPT | Performed by: NURSE PRACTITIONER

## 2021-06-23 RX ORDER — CYANOCOBALAMIN 1000 UG/ML
1000 INJECTION, SOLUTION INTRAMUSCULAR; SUBCUTANEOUS
Status: DISCONTINUED | OUTPATIENT
Start: 2021-06-23 | End: 2021-06-23

## 2021-06-23 RX ORDER — CYANOCOBALAMIN 1000 UG/ML
1000 INJECTION, SOLUTION INTRAMUSCULAR; SUBCUTANEOUS
Status: DISCONTINUED | OUTPATIENT
Start: 2021-06-23 | End: 2021-07-27

## 2021-06-23 RX ADMIN — CYANOCOBALAMIN 1000 MCG: 1000 INJECTION, SOLUTION INTRAMUSCULAR; SUBCUTANEOUS at 15:03

## 2021-07-15 VITALS
SYSTOLIC BLOOD PRESSURE: 124 MMHG | DIASTOLIC BLOOD PRESSURE: 78 MMHG | WEIGHT: 227.31 LBS | TEMPERATURE: 97.9 F | BODY MASS INDEX: 31.82 KG/M2 | HEART RATE: 84 BPM | OXYGEN SATURATION: 96 % | RESPIRATION RATE: 18 BRPM | HEIGHT: 71 IN

## 2021-07-26 ENCOUNTER — OFFICE VISIT (OUTPATIENT)
Dept: FAMILY MEDICINE CLINIC | Facility: CLINIC | Age: 41
End: 2021-07-26

## 2021-07-26 ENCOUNTER — TELEPHONE (OUTPATIENT)
Dept: FAMILY MEDICINE CLINIC | Facility: CLINIC | Age: 41
End: 2021-07-26

## 2021-07-26 ENCOUNTER — CLINICAL SUPPORT (OUTPATIENT)
Dept: FAMILY MEDICINE CLINIC | Facility: CLINIC | Age: 41
End: 2021-07-26

## 2021-07-26 VITALS
OXYGEN SATURATION: 98 % | HEART RATE: 74 BPM | DIASTOLIC BLOOD PRESSURE: 72 MMHG | WEIGHT: 233.6 LBS | RESPIRATION RATE: 18 BRPM | SYSTOLIC BLOOD PRESSURE: 122 MMHG | TEMPERATURE: 96.7 F | BODY MASS INDEX: 32.58 KG/M2

## 2021-07-26 DIAGNOSIS — K58.0 IRRITABLE BOWEL SYNDROME WITH DIARRHEA: Primary | ICD-10-CM

## 2021-07-26 DIAGNOSIS — R19.7 DIARRHEA, UNSPECIFIED TYPE: ICD-10-CM

## 2021-07-26 DIAGNOSIS — R10.9 ABDOMINAL DISCOMFORT: ICD-10-CM

## 2021-07-26 DIAGNOSIS — M25.50 ARTHRALGIA, UNSPECIFIED JOINT: ICD-10-CM

## 2021-07-26 DIAGNOSIS — E53.8 B12 DEFICIENCY: ICD-10-CM

## 2021-07-26 DIAGNOSIS — R42 DIZZINESS: ICD-10-CM

## 2021-07-26 PROCEDURE — 99214 OFFICE O/P EST MOD 30 MIN: CPT | Performed by: FAMILY MEDICINE

## 2021-07-26 PROCEDURE — 96372 THER/PROPH/DIAG INJ SC/IM: CPT | Performed by: FAMILY MEDICINE

## 2021-07-26 RX ORDER — ALLOPURINOL 300 MG/1
300 TABLET ORAL DAILY
COMMUNITY
Start: 2021-04-25 | End: 2021-09-23

## 2021-07-26 RX ORDER — CIPROFLOXACIN 500 MG/1
500 TABLET, FILM COATED ORAL 2 TIMES DAILY
Qty: 20 TABLET | Refills: 0 | OUTPATIENT
Start: 2021-07-26 | End: 2021-07-27

## 2021-07-26 RX ORDER — FOLIC ACID 1 MG/1
1 TABLET ORAL DAILY
COMMUNITY
Start: 2021-06-27 | End: 2021-09-23

## 2021-07-26 RX ORDER — CHLORAL HYDRATE 500 MG
1000 CAPSULE ORAL DAILY
COMMUNITY
End: 2021-09-14

## 2021-07-26 RX ORDER — ELUXADOLINE 75 MG/1
75 TABLET, FILM COATED ORAL 2 TIMES DAILY
Qty: 60 TABLET | Refills: 1 | Status: SHIPPED | OUTPATIENT
Start: 2021-07-26 | End: 2021-09-23 | Stop reason: CLARIF

## 2021-07-26 RX ADMIN — CYANOCOBALAMIN 1000 MCG: 1000 INJECTION, SOLUTION INTRAMUSCULAR; SUBCUTANEOUS at 16:17

## 2021-07-26 NOTE — PROGRESS NOTES
Chief Complaint   Patient presents with   • Dizziness     lightheadedness   • Numbness     arms/legs   • Temporomandibular Joint Pain     Subjective   Timi Hernandez is a 40 y.o. male who presents to the office for follow-up.     Pt presents for eval. He c/o out 5-10 loose stools a day.  This is a chronic issue for patient for several weeks off and on.  He has seen a specialist in the past and has been diagnosed with irritable bowel syndrome diarrhea type.  He is up-to-date on colonoscopy.  Denies blood in her stool.  He does think his food does not digest all the way.  Feels like he may have a digestive issue.  He does have a family history of GI disorders.  He does have an appointment to see the gastro doctor in September.  denies fever or chills.  He also feels dizzy and lightheaded intermittently.  He also c/o joint pain intermittently.    The following portions of the patient's history were reviewed and updated as appropriate: allergies, current medications, past family history, past medical history, past social history, past surgical history and problem list.    Review of Systems   Constitutional: Negative for chills and unexpected weight loss.   Respiratory: Negative for cough, chest tightness and shortness of breath.    Cardiovascular: Negative for chest pain and palpitations.   Gastrointestinal: Positive for diarrhea. Negative for abdominal pain, constipation, nausea and vomiting.        Objective   Vitals:    07/26/21 0831   BP: 122/72   BP Location: Left arm   Patient Position: Sitting   Cuff Size: Adult   Pulse: 74   Resp: 18   Temp: 96.7 °F (35.9 °C)   SpO2: 98%   Weight: 106 kg (233 lb 9.6 oz)     Body mass index is 32.58 kg/m².  Physical Exam  Vitals reviewed.   Constitutional:       General: He is not in acute distress.     Appearance: Normal appearance. He is not ill-appearing.   HENT:      Head: Normocephalic.   Eyes:      Conjunctiva/sclera: Conjunctivae normal.   Cardiovascular:      Rate and  Rhythm: Normal rate and regular rhythm.   Pulmonary:      Effort: Pulmonary effort is normal.      Breath sounds: Normal breath sounds.   Skin:     Findings: No rash.   Neurological:      Mental Status: He is alert and oriented to person, place, and time.   Psychiatric:         Mood and Affect: Mood normal.          Assessment/Plan   Diagnoses and all orders for this visit:    1. Irritable bowel syndrome with diarrhea (Primary)  -     Eluxadoline (Viberzi) 75 MG tablet; Take 75 mg by mouth 2 (two) times a day.  Dispense: 60 tablet; Refill: 1    2. Abdominal discomfort  -     Celiac Panel Reflex To Titer; Future    3. Arthralgia, unspecified joint  -     Dearborn SF (IgG / M); Future  -     Lyme Disease IgG/IgM Antibodies; Future    4. Diarrhea, unspecified type    5. Dizziness    Other orders  -     ciprofloxacin (CIPRO) 500 MG tablet; Take 1 tablet by mouth 2 (Two) Times a Day.  Dispense: 20 tablet; Refill: 0           Will do a stool study also for diarrhea.  Start abx. He may have bacterial overload also.  Also start viberzi.   Do labs.  Dizziness is likely volume depletion/orthostatic. Please increase fluids.  Also keep appt with Gastro.  Depression screen is positive. Likely due to his physical health.     No follow-ups on file.   PHQ-2/PHQ-9 Depression Screening 7/26/2021   Little interest or pleasure in doing things 3   Feeling down, depressed, or hopeless 3   Trouble falling or staying asleep, or sleeping too much 3   Feeling tired or having little energy 3   Poor appetite or overeating 2   Feeling bad about yourself - or that you are a failure or have let yourself or your family down 3   Trouble concentrating on things, such as reading the newspaper or watching television 0   Moving or speaking so slowly that other people could have noticed. Or the opposite - being so fidgety or restless that you have been moving around a lot more than usual 1   Thoughts that you would be better off dead, or of  hurting yourself in some way 0   Total Score 18

## 2021-07-29 ENCOUNTER — LAB (OUTPATIENT)
Dept: LAB | Facility: HOSPITAL | Age: 41
End: 2021-07-29

## 2021-07-29 DIAGNOSIS — R19.7 DIARRHEA, UNSPECIFIED TYPE: ICD-10-CM

## 2021-07-29 DIAGNOSIS — M25.50 ARTHRALGIA, UNSPECIFIED JOINT: ICD-10-CM

## 2021-07-29 DIAGNOSIS — R10.9 ABDOMINAL DISCOMFORT: Primary | ICD-10-CM

## 2021-07-29 PROCEDURE — 86617 LYME DISEASE ANTIBODY: CPT

## 2021-07-29 PROCEDURE — 86618 LYME DISEASE ANTIBODY: CPT

## 2021-07-29 PROCEDURE — 36415 COLL VENOUS BLD VENIPUNCTURE: CPT

## 2021-07-29 PROCEDURE — 86757 RICKETTSIA ANTIBODY: CPT

## 2021-07-29 PROCEDURE — 82784 ASSAY IGA/IGD/IGG/IGM EACH: CPT

## 2021-07-29 PROCEDURE — 83516 IMMUNOASSAY NONANTIBODY: CPT

## 2021-07-30 ENCOUNTER — LAB (OUTPATIENT)
Dept: LAB | Facility: HOSPITAL | Age: 41
End: 2021-07-30

## 2021-07-30 ENCOUNTER — TELEPHONE (OUTPATIENT)
Dept: FAMILY MEDICINE CLINIC | Facility: CLINIC | Age: 41
End: 2021-07-30

## 2021-07-30 DIAGNOSIS — R19.7 DIARRHEA, UNSPECIFIED TYPE: Primary | ICD-10-CM

## 2021-07-30 DIAGNOSIS — R10.9 ABDOMINAL PAIN, UNSPECIFIED ABDOMINAL LOCATION: Primary | ICD-10-CM

## 2021-07-30 DIAGNOSIS — R19.7 DIARRHEA, UNSPECIFIED TYPE: ICD-10-CM

## 2021-07-30 LAB
027 TOXIN: NORMAL
B BURGDOR IGG SER QL: NEGATIVE
B BURGDOR IGM SER QL: POSITIVE
C DIFF TOX GENS STL QL NAA+PROBE: NEGATIVE
GLIADIN PEPTIDE IGA SER-ACNC: 18 UNITS (ref 0–19)
HEMOCCULT STL QL IA: NEGATIVE
IGA SERPL-MCNC: 248 MG/DL (ref 90–386)
LACTOFERRIN STL QL LA: NEGATIVE
TTG IGA SER-ACNC: <2 U/ML (ref 0–3)

## 2021-07-30 PROCEDURE — 87493 C DIFF AMPLIFIED PROBE: CPT

## 2021-07-30 PROCEDURE — 82274 ASSAY TEST FOR BLOOD FECAL: CPT

## 2021-07-30 PROCEDURE — 87506 IADNA-DNA/RNA PROBE TQ 6-11: CPT | Performed by: FAMILY MEDICINE

## 2021-07-30 PROCEDURE — 87505 NFCT AGENT DETECTION GI: CPT | Performed by: FAMILY MEDICINE

## 2021-07-30 PROCEDURE — 87505 NFCT AGENT DETECTION GI: CPT

## 2021-07-30 PROCEDURE — 83630 LACTOFERRIN FECAL (QUAL): CPT

## 2021-07-30 RX ORDER — DOXYCYCLINE HYCLATE 100 MG
100 TABLET ORAL 2 TIMES DAILY
Qty: 60 TABLET | Refills: 0 | Status: SHIPPED | OUTPATIENT
Start: 2021-07-30 | End: 2021-09-14

## 2021-07-31 LAB
CRYPTOSP DNA STL QL NAA+NON-PROBE: NOT DETECTED
E HISTOLYT DNA STL QL NAA+NON-PROBE: NOT DETECTED
G LAMBLIA DNA STL QL NAA+NON-PROBE: NOT DETECTED

## 2021-08-03 LAB
R RICKETTSI IGG SER QL IA: NEGATIVE
R RICKETTSI IGM SER-ACNC: 0.09 INDEX (ref 0–0.89)

## 2021-08-05 ENCOUNTER — TELEPHONE (OUTPATIENT)
Dept: FAMILY MEDICINE CLINIC | Facility: CLINIC | Age: 41
End: 2021-08-05

## 2021-08-05 DIAGNOSIS — A69.20 LYME DISEASE: Primary | ICD-10-CM

## 2021-08-05 NOTE — TELEPHONE ENCOUNTER
----- Message from Tushar Rutherford MD sent at 8/4/2021  6:27 PM EDT -----  Please call and let pt know that the stool studies were normal. No infection in the stool. Also the celiac panel was negative. The [place] was negative. The lyme's was positive as we discussed. So stay on the doxycycline for 1 month. I want to repeat the lyme test in 1 month. Called a lyme western blot.  Place order for lyme western blot. To be done in 1 month. Dx lyme disease.thanks. if he has any other questions, please let me know. Thanks.

## 2021-08-06 LAB
B BURGDOR IGG PATRN SER IB-IMP: NEGATIVE
B BURGDOR IGM PATRN SER IB-IMP: POSITIVE
B BURGDOR18KD IGG SER QL IB: ABNORMAL
B BURGDOR23KD IGG SER QL IB: ABNORMAL
B BURGDOR23KD IGM SER QL IB: PRESENT
B BURGDOR28KD IGG SER QL IB: ABNORMAL
B BURGDOR30KD IGG SER QL IB: ABNORMAL
B BURGDOR39KD IGG SER QL IB: ABNORMAL
B BURGDOR39KD IGM SER QL IB: ABNORMAL
B BURGDOR41KD IGG SER QL IB: PRESENT
B BURGDOR41KD IGM SER QL IB: PRESENT
B BURGDOR45KD IGG SER QL IB: ABNORMAL
B BURGDOR58KD IGG SER QL IB: ABNORMAL
B BURGDOR66KD IGG SER QL IB: ABNORMAL
B BURGDOR93KD IGG SER QL IB: ABNORMAL

## 2021-08-13 ENCOUNTER — TELEPHONE (OUTPATIENT)
Dept: FAMILY MEDICINE CLINIC | Facility: CLINIC | Age: 41
End: 2021-08-13

## 2021-08-13 NOTE — TELEPHONE ENCOUNTER
Caller: Timi Hernandez    Relationship: Self    Best call back number: [phone]     What form or medical record are you requesting: PATIENT STATES HE HAS LYME DISEASE AND WOULD LIKE A DOCTORS NOTE EXPLAINING HIS CONDITION SO HE CAN GIVE IT TO HIS EMPLOYER     Who is requesting this form or medical record from you: EMPLOYER    How would you like to receive the form or medical records (pick-up, mail, fax):    If pick-up, provide patient with address and location details    Timeframe paperwork needed: ASAP    Additional notes: PATIENT STATES HE WOULD LIKE A CALL BACK WHEN HE CAN COME PICK THIS UP

## 2021-08-13 NOTE — TELEPHONE ENCOUNTER
Spoke with patient he would like a letter stating that he currently has Lyme Disease and that he has been having flares up that are making it difficult to concentrate/ brain fog. He is taking the antibiotics prescribed and will get he lab done after he is finished as directed.

## 2021-08-19 ENCOUNTER — TELEPHONE (OUTPATIENT)
Dept: GASTROENTEROLOGY | Facility: CLINIC | Age: 41
End: 2021-08-19

## 2021-08-19 DIAGNOSIS — R42 DIZZINESS: ICD-10-CM

## 2021-08-19 DIAGNOSIS — G56.90 NEUROPATHY OF UPPER EXTREMITY, UNSPECIFIED LATERALITY: ICD-10-CM

## 2021-08-19 DIAGNOSIS — R51.9 ACUTE NONINTRACTABLE HEADACHE, UNSPECIFIED HEADACHE TYPE: Primary | ICD-10-CM

## 2021-08-19 DIAGNOSIS — M54.2 CERVICAL SPINE PAIN: ICD-10-CM

## 2021-08-19 NOTE — TELEPHONE ENCOUNTER
----- Message from Timi Hernandez sent at 8/18/2021  9:23 AM EDT -----  Regarding: Test Results Question  Contact: [phone]  JARRET Kaiser,    I tested positive for Lyme disease with 2 different tests. On treatment with doxycycline for a month. Hopefully, it knocks it out. but I have been on meds for 2 and a half weeks and I am still symptoms are Fatigue most days, Achy, stiff, and swollen back, neck shoulders and feet, Headaches and dizziness, focusing and foggy brain, eyes Sensitivity to light, blurred vision, neurological issues such as Left hand not being able to function properly, have issues writing and drawing with my right hand. Shortness of breath, and heart palpitations, irritable, anxious, after eating and drinking(not alcohol) sometimes blood feels like it is burning, and numbness and tingling in hands, feet, and limbs.    Dontrell Hernandez  [phone]

## 2021-08-19 NOTE — TELEPHONE ENCOUNTER
Spoke to pt in regards to his symptoms. Will order neurology referrel  MRI brain and cervical spine  Urged pt if symptoms worsened to go to the ED.

## 2021-08-27 ENCOUNTER — CLINICAL SUPPORT (OUTPATIENT)
Dept: FAMILY MEDICINE CLINIC | Facility: CLINIC | Age: 41
End: 2021-08-27

## 2021-08-27 ENCOUNTER — LAB (OUTPATIENT)
Dept: LAB | Facility: HOSPITAL | Age: 41
End: 2021-08-27

## 2021-08-27 DIAGNOSIS — A69.20 LYME DISEASE: ICD-10-CM

## 2021-08-27 DIAGNOSIS — E53.8 B12 DEFICIENCY: Primary | ICD-10-CM

## 2021-08-27 PROCEDURE — 86617 LYME DISEASE ANTIBODY: CPT

## 2021-08-27 PROCEDURE — 36415 COLL VENOUS BLD VENIPUNCTURE: CPT

## 2021-08-27 PROCEDURE — 96372 THER/PROPH/DIAG INJ SC/IM: CPT | Performed by: FAMILY MEDICINE

## 2021-08-27 RX ORDER — CYANOCOBALAMIN 1000 UG/ML
1000 INJECTION, SOLUTION INTRAMUSCULAR; SUBCUTANEOUS
Status: DISCONTINUED | OUTPATIENT
Start: 2021-08-27 | End: 2022-06-16

## 2021-08-27 RX ADMIN — CYANOCOBALAMIN 1000 MCG: 1000 INJECTION, SOLUTION INTRAMUSCULAR; SUBCUTANEOUS at 10:42

## 2021-08-31 ENCOUNTER — TELEPHONE (OUTPATIENT)
Dept: CARDIOLOGY | Facility: CLINIC | Age: 41
End: 2021-08-31

## 2021-08-31 ENCOUNTER — CLINICAL SUPPORT (OUTPATIENT)
Dept: CARDIOLOGY | Facility: CLINIC | Age: 41
End: 2021-08-31

## 2021-08-31 DIAGNOSIS — I45.10 RBBB: Primary | ICD-10-CM

## 2021-08-31 DIAGNOSIS — Z82.49 FAMILY HISTORY OF LONG QT SYNDROME: Primary | ICD-10-CM

## 2021-08-31 DIAGNOSIS — Z82.49 FAMILY HISTORY OF LONG QT SYNDROME: ICD-10-CM

## 2021-08-31 PROCEDURE — 93000 ELECTROCARDIOGRAM COMPLETE: CPT | Performed by: INTERNAL MEDICINE

## 2021-08-31 NOTE — PROGRESS NOTES
ECG 12 Lead    Date/Time: 8/31/2021 4:31 PM  Performed by: Abram Sawant MD  Authorized by: Abram Sawant MD   Comparison: compared with previous ECG   Similar to previous ECG  Rhythm: sinus rhythm  Conduction: right bundle branch block  Other findings: prolonged QTc interval

## 2021-09-02 LAB
B BURGDOR IGG PATRN SER IB-IMP: NEGATIVE
B BURGDOR IGM PATRN SER IB-IMP: NEGATIVE
B BURGDOR18KD IGG SER QL IB: ABNORMAL
B BURGDOR23KD IGG SER QL IB: ABNORMAL
B BURGDOR23KD IGM SER QL IB: PRESENT
B BURGDOR28KD IGG SER QL IB: ABNORMAL
B BURGDOR30KD IGG SER QL IB: ABNORMAL
B BURGDOR39KD IGG SER QL IB: ABNORMAL
B BURGDOR39KD IGM SER QL IB: ABNORMAL
B BURGDOR41KD IGG SER QL IB: PRESENT
B BURGDOR41KD IGM SER QL IB: ABNORMAL
B BURGDOR45KD IGG SER QL IB: ABNORMAL
B BURGDOR58KD IGG SER QL IB: ABNORMAL
B BURGDOR66KD IGG SER QL IB: ABNORMAL
B BURGDOR93KD IGG SER QL IB: ABNORMAL

## 2021-09-03 ENCOUNTER — HOSPITAL ENCOUNTER (OUTPATIENT)
Dept: MRI IMAGING | Facility: HOSPITAL | Age: 41
Discharge: HOME | End: 2021-09-03

## 2021-09-03 DIAGNOSIS — M54.2 CERVICAL SPINE PAIN: ICD-10-CM

## 2021-09-03 DIAGNOSIS — G56.90 NEUROPATHY OF UPPER EXTREMITY, UNSPECIFIED LATERALITY: ICD-10-CM

## 2021-09-03 DIAGNOSIS — R51.9 ACUTE NONINTRACTABLE HEADACHE, UNSPECIFIED HEADACHE TYPE: ICD-10-CM

## 2021-09-03 DIAGNOSIS — R42 DIZZINESS: ICD-10-CM

## 2021-09-03 LAB — CREAT BLDA-MCNC: 1 MG/DL

## 2021-09-03 PROCEDURE — 82565 ASSAY OF CREATININE: CPT

## 2021-09-03 PROCEDURE — A9577 INJ MULTIHANCE: HCPCS | Performed by: FAMILY MEDICINE

## 2021-09-03 PROCEDURE — 70553 MRI BRAIN STEM W/O & W/DYE: CPT

## 2021-09-03 PROCEDURE — 0 GADOBENATE DIMEGLUMINE 529 MG/ML SOLUTION: Performed by: FAMILY MEDICINE

## 2021-09-03 PROCEDURE — 72141 MRI NECK SPINE W/O DYE: CPT

## 2021-09-03 RX ADMIN — GADOBENATE DIMEGLUMINE 20 ML: 529 INJECTION, SOLUTION INTRAVENOUS at 16:13

## 2021-09-07 ENCOUNTER — CLINICAL SUPPORT (OUTPATIENT)
Dept: GENETICS | Facility: HOSPITAL | Age: 41
End: 2021-09-07

## 2021-09-07 ENCOUNTER — TELEPHONE (OUTPATIENT)
Dept: FAMILY MEDICINE CLINIC | Facility: CLINIC | Age: 41
End: 2021-09-07

## 2021-09-07 DIAGNOSIS — Z13.79 GENETIC TESTING: Primary | ICD-10-CM

## 2021-09-07 DIAGNOSIS — Z82.49 FAMILY HISTORY OF LONG QT SYNDROME: ICD-10-CM

## 2021-09-07 DIAGNOSIS — R94.31 PROLONGED QT INTERVAL: ICD-10-CM

## 2021-09-07 NOTE — TELEPHONE ENCOUNTER
Please call and let pt know I received the MRIs. I will call him with his results sometime after my clinic this late afternoon or evening.  The MRIs did not look bad at all but I will go over everything. Thanks.

## 2021-09-08 ENCOUNTER — TELEPHONE (OUTPATIENT)
Dept: FAMILY MEDICINE CLINIC | Facility: CLINIC | Age: 41
End: 2021-09-08

## 2021-09-08 NOTE — TELEPHONE ENCOUNTER
Caller: Timi Hernandez    Relationship: Self    Best call back number:[phone]    What is the best time to reach you: ANY     Who are you requesting to speak with (clinical staff, provider,  specific staff member):DR. WALLACE         What was the call regarding: PATIENT STATES THAT HE WANTS TO BE TESTED FOR FIBROMYALGIA BEFORE BEING PRESCRIBED MEDICATION     Do you require a callback: YES     PLEASE ADVISE PATIENT. THANKS.

## 2021-09-08 NOTE — PROGRESS NOTES
Timi Hernandez, a 41-year old male was seen for genetic counseling due to a family history of Long QT syndrome (LQTS). Genetic counseling was completed via telehealth. Mr. Hernandez recently had an EKG that demonstrated a prolonged QT interval in the setting of RBBB. Mr. Hernandez was interested in discussing his family history and risk for LQTS. Genetic testing via a comprehensive panel was ordered through [facility], which analyzes 157 genes known to be associated with cardiomyopathy and arrhythmia. A saliva collection kit is being mailed to Mr. Hernandez. Once the sample is sent in, results are expected in approximately three weeks.    CARDIAC FAMILY HISTORY: (See attached pedigree)     Son:     Slight prolonged QT interval dx. age 13; Epilepsy dx. 18 months  Mother:    Heart attack in 50s, stent placed  Mat. Uncle:    Pacemaker; currently on a heart transplant list  Mat. Uncle:    Sudden cardiac death at age 55  Mat. Grandfather:   Sudden cardiac death at age 36  Mat. 1st cousin once removed: LQTS confirmed by genetic testing    We do not have medical records regarding the diagnoses in the family. Mr. Hernandez is working to obtain a copy of his maternal 1st cousin once removed genetic test results that are reported to confirm a diagnosis of LQTS.     GENETIC COUNSELING: We began by discussing the features of LQTS.  A prolonged QT interval is most often due to inherited long QT syndrome (LQTS), but can also be induced by various drug exposures, hypokalemia (low potassium) and structural heart defects.  Determining the etiology greatly impacts the medical management and counseling regarding risk to the patient and their family members.  Long QT Syndrome (LQTS) is a common cardiac channelopathy, affecting approximately 1/2500 individuals in the [place] with 500-1,000 new carriers born each year. Individuals with LQTS are at risk for syncopal events, ventricular fibrillation and aborted cardiac arrest (if the individual is  defibrillated) or sudden death.  While there is no cure, proper medical management including combinations of lifestyle modification, â-blocker therapy and implantable cardioverter-defibrillators (ICDs) can reduce the risks.    Familial LQTS is diagnosed with a combination of measurement of the QT interval on ECG and family history.  The most common type of familial LQTS, also called Lara-Del Toro syndrome, is inherited in an autosomal dominant manner, meaning a single non-working copy of the gene is enough to cause the condition.  The gene may be passed from either the mother or father to either sons or daughters. Children of an individual who has LQTS have a 50% chance of inheriting the disease causing gene mutation.  Familial LQTS displays incomplete penetrance with variable expressivity, meaning that not all individuals who inherit the disease causing gene mutation in a family will display symptoms of the condition and that the symptoms can vary within a family.  Approximately 1/3 of individuals with familial LQTS remain asymptomatic.        A resting ECG is used to diagnose affected individuals within in a family; however, the QT interval on ECG can be indeterminate in 30% of affected individuals.  Additionally, a normal resting ECG has been observed in a few individuals (<<1%) that were known to have Lara-Del Toro syndrome.  Genetic testing identifies a mutation in approximately 75% of patients diagnosed with familial LQTS.  Familial LQTS syndrome demonstrates strong genotype/phenotype correlations, meaning the condition behaves differently depending on the gene responsible.  Genetic testing can help determine not only the risk of a cardiac event, but also the type of trigger that causes the cardiac event (loud noises and the startle response versus exercise versus sleep, etc.).  Additionally, the identification of a mutation would allow other family members to undergo informative genetic testing to assess their  risk for LQTS.    GENETIC TESTING: The risks, benefits and limitations of genetic testing and implications for clinical management following testing were reviewed.  DNA test results can influence decisions regarding screening management for family members.  Also discussed was the possibility of employment and insurance discrimination based on genetic test results and the laws in place to prevent this ([place]ON), as well as the limitations of these laws.    The implications of a positive or negative test result were discussed.  We discussed the possibility that, in some cases, genetic test results may be informative or may be ambiguous due to the identification of a genetic variant of uncertain significance (VUS). These variants are findings that may or may not impact the function of a gene.  Given his personal history, a negative test result would not eliminate all risk to family members due to the possibility of causative genes that have yet to be identified, or the possibility of a combination of genetic and environmental factors influencing disease.      PLAN: A saliva collection kit is being mailed to Mr. Hernandez’s home address. Results are expected 2-3 weeks after the testing lab receives his sample. Mr. Hernandez will be contacted with his results at that time.  We encourage Mr. Hernandez to contact us in the meantime if he has any questions or concerns at [phone].        Valery Gee, MS, [facility], [facility]  Licensed Certified Genetic Counselor

## 2021-09-09 NOTE — TELEPHONE ENCOUNTER
Please call and let pt know that there is no blood test for fibromyalgia. But essentially the lyrica will help with the back pain/neck pain/neuropathy and any fibro type pain that is muscle pain/aches. He can also try the med to see if it works, and if he doesn't like the med we can always stop it. Just let me know what he wants to do. thanks

## 2021-09-14 ENCOUNTER — OFFICE VISIT (OUTPATIENT)
Dept: CARDIOLOGY | Facility: CLINIC | Age: 41
End: 2021-09-14

## 2021-09-14 VITALS
HEART RATE: 68 BPM | BODY MASS INDEX: 31.83 KG/M2 | OXYGEN SATURATION: 98 % | DIASTOLIC BLOOD PRESSURE: 84 MMHG | SYSTOLIC BLOOD PRESSURE: 140 MMHG | HEIGHT: 72 IN | WEIGHT: 235 LBS

## 2021-09-14 DIAGNOSIS — Z82.49 FAMILY HISTORY OF LONG QT SYNDROME: Primary | ICD-10-CM

## 2021-09-14 DIAGNOSIS — I45.10 RBBB: ICD-10-CM

## 2021-09-14 DIAGNOSIS — I45.3: ICD-10-CM

## 2021-09-14 DIAGNOSIS — I45.81 LONG Q-T SYNDROME: ICD-10-CM

## 2021-09-14 PROCEDURE — 93000 ELECTROCARDIOGRAM COMPLETE: CPT | Performed by: INTERNAL MEDICINE

## 2021-09-14 PROCEDURE — 99214 OFFICE O/P EST MOD 30 MIN: CPT | Performed by: INTERNAL MEDICINE

## 2021-09-14 RX ORDER — NADOLOL 20 MG/1
20 TABLET ORAL EVERY 12 HOURS
Qty: 60 TABLET | Refills: 5 | Status: ON HOLD | OUTPATIENT
Start: 2021-09-14 | End: 2022-02-03

## 2021-09-14 NOTE — PROGRESS NOTES
Cardiac Electrophysiology Outpatient Follow Up Note            [facility]    Follow Up Office Visit      Timi Hernandez  [id number]  09/14/2021  [unfilled]  [unfilled]    Primary Care Physician: Tushar Rutherford MD    Referred By: No ref. provider found    Subjective     Chief Complaint:   Diagnoses and all orders for this visit:    1. Family history of long QT syndrome (Primary)  -     MRI Cardiac Function Complete With & Without Morphology    2. RBBB  -     MRI Cardiac Function Complete With & Without Morphology    3. Long Q-T syndrome  -     MRI Cardiac Function Complete With & Without Morphology    4. Right bundle branch block (RBBB), 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)    Other orders  -     nadolol (Corgard) 20 MG tablet; Take 1 tablet by mouth Every 12 (Twelve) Hours for 30 days. Take at bed time  Dispense: 60 tablet; Refill: 5      Chief Complaint   Patient presents with   • Long QT Syndrome     Problem list:  1. Family history of Long QT syndrome  a. Uncle with reported long QT syndrome. Grandfather with sudden cardiac death at age 36   b. Echo 3/29/2021 ejection fraction 55%, no significant valvular heart issues.  c. Stress Test 3/29/2021 good exercise tolerance, and exercise EKG negative for ischemia, atypical chest pain and abdominal pain with exercise, no arrhythmias seen.  Study is consistent with no reversible ischemia at rest or with exercise.  This indicates a low probability of coronary artery disease in this individual.  QT interval changes appropriately and is not consistent with long QT syndrome.  d. 3/22/2021 48-hour Holter monitor normal sinus rhythm average heart rate of 86 bpm, 8 episodes of PACs, one 5 beat run of supraventricular tachycardia in the SVT consistent with ectopic atrial tachycardia  2. RBBB with left posterior fascicular block  3. HTN  4. HLD   5. SANCHEZ   6. GERD     History of  Present Illness:   Timi Hernandez is a 41 y.o. male who presents to my electrophysiology clinic for follow up of a constellation of complaints and concerns as outlined above.  He is here today for follow-up.  Timi since we last seen him had an episode of syncope.  He was playing with his son.  He felt warm flushed all over knew he was going to lose consciousness and then when he woke up he felt profoundly tired and had to go to bed.  Has had episodes like this previously most recently in 2019 prior to Covid.  All of these episodes are similar where he feels nauseated fatigued afterwards.    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    He tells me that he has been troubled by anxiety.  He tells me indeed that he has posttraumatic stress disorder stemming from his exposure to scenes of hay is a  .      Later this afternoon he is going to go and complete genetic test kit and mail it.    He tells me that he was recently diagnosed with Lyme's disease.  Serology titers are improving.  He has completed antibiotic therapy already.    Review of Systems:   Constitutional: No fevers or chills, no recent weight gain or weight loss or fatigue  Eyes: No visual loss, blurred vision, double vision, yellow sclerae.  ENT: No headaches, hearing loss, vertigo, congestion or sore throat.   Cardiovascular: Per HPI  Respiratory: No cough or wheezing, no sputum production, no hematemesis   Gastrointestinal: No abdominal pain, no nausea, vomiting, constipation, diarrhea, melena.   Genitourinary: No dysuria, hematuria or increased frequency.  Musculoskeletal:  No gait disturbance, weakness or joint pain or stiffness  Integumentary: No rashes, urticaria, ulcers or sores.   Neurological: No headache, dizziness, syncope, paralysis, ataxia, no prior CVA/TIA  Psychiatric: No anxiety, or  depression  Endocrine: No diaphoresis, cold or heat intolerance. No polyuria or polydipsia.   Hematologic/Lymphatic: No anemia, abnormal bruising or bleeding. No history of DVT/PE.      Past Medical History:   Past Medical History:   Diagnosis Date   • Abnormal ECG    • Anxiety    • Arthritis    • Chest pain    • Chronic allergic rhinitis    • Diabetes (CMS/HCC)    • Elevated liver enzymes    • Essential hypertension    • Family history of colon cancer in mother    • Fatty liver    • GERD (gastroesophageal reflux disease)    • Gout    • HBP (high blood pressure)    • Heart disease    • Hepatomegaly    • High cholesterol    • IBS (irritable bowel syndrome)    • Kidney stones    • Loose stools    • Lyme disease 2021       Past Surgical History:   Past Surgical History:   Procedure Laterality Date   • CHOLECYSTECTOMY  2017    Dr. Ray   • GALLBLADDER SURGERY     • HERNIA REPAIR     • LASIK     • TOE SURGERY     • TONSILLECTOMY     • VASECTOMY         Family History:   Family History   Problem Relation Age of Onset   • Heart failure Mother    • Hyperlipidemia Mother    • Heart attack Mother    • Hypertension Mother    • Colon cancer Mother 53   • Stroke Mother    • Heart disease Father    • Stroke Maternal Grandfather    • Heart attack Other    • Heart disease Other    • Heart failure Other    • Hypertension Other    • Hyperlipidemia Other    • Heart disease Sister    • Lung cancer Maternal Uncle         50s   • Heart disease Maternal Uncle    • Heart disease Other        Social History:   Social History     Socioeconomic History   • Marital status:      Spouse name: Not on file   • Number of children: Not on file   • Years of education: Not on file   • Highest education level: Not on file   Tobacco Use   • Smoking status: Never Smoker   • Smokeless tobacco: Never Used   • Tobacco comment: second hand smoke exposure-never   Substance and Sexual Activity   • Alcohol use: Never   • Drug use: Never   • Sexual  "activity: Defer       Medications:     Current Outpatient Medications:   •  aspirin 81 MG EC tablet, Take 81 mg by mouth Daily., Disp: , Rfl:   •  cetirizine (ZyrTEC Allergy) 10 MG tablet, Zyrtec 10 mg oral tablet take 1 tablet (10 mg) by oral route once daily   Active, Disp: , Rfl:   •  clonazePAM (KlonoPIN) 1 MG tablet, Take 1 mg by mouth Daily., Disp: , Rfl:   •  fenofibrate 160 MG tablet, Take 1 tablet by mouth Daily., Disp: 30 tablet, Rfl: 6  •  hyoscyamine (LEVSIN) 0.125 MG SL tablet, Take 1 tablet by mouth Every 4 (Four) Hours As Needed., Disp: , Rfl:   •  icosapent ethyl (Vascepa) 1 g capsule capsule, Vascepa 1 gram oral capsule 4 times a day   Suspended, Disp: , Rfl:   •  allopurinol (ZYLOPRIM) 300 MG tablet, Take 300 mg by mouth Daily., Disp: , Rfl:   •  Eluxadoline (Viberzi) 75 MG tablet, Take 75 mg by mouth 2 (two) times a day., Disp: 60 tablet, Rfl: 1  •  folic acid (FOLVITE) 1 MG tablet, Take 1 mg by mouth Daily., Disp: , Rfl:   •  nadolol (Corgard) 20 MG tablet, Take 1 tablet by mouth Every 12 (Twelve) Hours for 30 days. Take at bed time, Disp: 60 tablet, Rfl: 5    Current Facility-Administered Medications:   •  cyanocobalamin injection 1,000 mcg, 1,000 mcg, Intramuscular, Q28 Days, Tushar Rutherford MD, 1,000 mcg at 08/27/21 1042    Allergies:   Allergies   Allergen Reactions   • Penicillins Anaphylaxis   • Sulfamethoxazole-Trimethoprim Anaphylaxis and Rash     Other reaction(s): sore throat and ulcers   • Colchicine Rash     PT stated body was burning       Objective   Vital Signs:   Vitals:    09/14/21 1154   BP: 140/84   BP Location: Left arm   Patient Position: Sitting   Pulse: 68   SpO2: 98%   Weight: 107 kg (235 lb)   Height: 182.9 cm (72\")       PHYSICAL EXAM  General appearance: Awake, alert, cooperative  Head: Normocephalic, without obvious abnormality, atraumatic  Eyes: Conjunctivae/corneas clear, EOMs intact  Neck: no adenopathy, no carotid bruit, no JVD and thyroid: not enlarged  Lungs: " "clear to auscultation bilaterally and no rhonchi or crackles\", ' symmetric  Heart: regular rate and rhythm, S1, S2 normal, no murmur, click, rub or gallop  Abdomen: Soft, non-tender, bowel sounds normal,  no organomegaly  Extremities: extremities normal, atraumatic, no cyanosis or edema  Skin: Skin color, turgor normal, no rashes or lesions  Neurologic: Grossly normal     Lab Results   Component Value Date    CALCIUM 9.6 05/28/2021     05/28/2021    K 4.5 05/28/2021    CO2 28 05/28/2021    CL 98 (L) 05/28/2021    BUN 15 05/28/2021    CREATININE 1.00 09/03/2021    BCR 15 05/28/2021    ANIONGAP 15 05/28/2021     Lab Results   Component Value Date    WBC 7.54 05/28/2021    HGB 14.7 05/28/2021    HCT 42.5 05/28/2021    MCV 82.5 05/28/2021     05/28/2021     Lab Results   Component Value Date    INR 1.01 (L) 05/28/2021    INR 1.04 (L) 08/20/2020    INR 1.41 (L) 07/10/2020    PROTIME 11.0 05/28/2021    PROTIME 11.1 08/20/2020    PROTIME 14.4 (H) 07/10/2020     Lab Results   Component Value Date    TSH 1.540 05/28/2021       Cardiac Testing:     I personally viewed and interpreted the patient's EKG/Telemetry/lab data      ECG 12 Lead    Date/Time: 9/14/2021 2:17 PM  Performed by: Evans Pettit DO  Authorized by: Evans Pettit DO   Comparison: compared with previous ECG   Rhythm: sinus rhythm  Conduction: right bundle branch block and left posterior fascicular block            Tobacco Cessation: N/A  Obstructive Sleep Apnea Screening: Completed    Assessment & Plan    Diagnoses and all orders for this visit:    1. Family history of long QT syndrome (Primary)  -     MRI Cardiac Function Complete With & Without Morphology    2. RBBB  -     MRI Cardiac Function Complete With & Without Morphology    3. Long Q-T syndrome  -     MRI Cardiac Function Complete With & Without Morphology    4. Right bundle branch block (RBBB), 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)    Other " orders  -     nadolol (Corgard) 20 MG tablet; Take 1 tablet by mouth Every 12 (Twelve) Hours for 30 days. Take at bed time  Dispense: 60 tablet; Refill: 5         Diagnosis Plan   1. Family history of long QT syndrome   this patient has normal QT interval.  Clinically he has no evidence of congenital long QT.    Genetic testing pending.    He has seen in talk to the genetic counseling service at [facility].   2. RBBB with left posterior fascicular block  atypical right bundle branch block.  Diffuse nonspecific repolarization abnormality.    Cardiac MRI testing is required.  Discussed this with the patient at length.  He understands fully cardiac MRIs.  He recently underwent neurological MRI testing.    At this point I do not think that his recent diagnosis of Lyme's disease pertains.     Body mass index is 31.87 kg/m².    I spent 47 minutes in consultation with this patient which included more than 65% of this time in direct face-to-face counseling, physical examination and discussion of my assessment and findings and shared decision making with the patient.  The remainder of the time not spent face to face was performing one, some or all of the following actions:  preparing to see this patient ( eg. Review of tests),  ordering medications, tests or procedures ), care coordination, discussion of the plan with other healthcare providers, documenting clinical information in Epic well as independently interpreting results and communicating results to patient, family and or caregiver.  All time noted occurred on the date of service.    Follow Up:       Thank you for allowing me to participate in the care of your patient. Please to not hesitate to contact me with additional questions or concerns.      Evans Pettit, DO, FACC, RS  Cardiac Electrophysiologist  [facility] / [facility]

## 2021-09-23 ENCOUNTER — OFFICE VISIT (OUTPATIENT)
Dept: GASTROENTEROLOGY | Facility: CLINIC | Age: 41
End: 2021-09-23

## 2021-09-23 VITALS
HEIGHT: 72 IN | BODY MASS INDEX: 32.75 KG/M2 | TEMPERATURE: 98.4 F | DIASTOLIC BLOOD PRESSURE: 78 MMHG | HEART RATE: 55 BPM | WEIGHT: 241.8 LBS | SYSTOLIC BLOOD PRESSURE: 128 MMHG

## 2021-09-23 DIAGNOSIS — R79.89 ELEVATED LFTS: Primary | ICD-10-CM

## 2021-09-23 DIAGNOSIS — K59.1 FUNCTIONAL DIARRHEA: ICD-10-CM

## 2021-09-23 DIAGNOSIS — K76.0 FATTY LIVER: ICD-10-CM

## 2021-09-23 PROCEDURE — 99214 OFFICE O/P EST MOD 30 MIN: CPT | Performed by: NURSE PRACTITIONER

## 2021-09-23 NOTE — PROGRESS NOTES
Patient Name: Timi Hernandez   Visit Date: 09/23/2021   Patient ID: [id number]  Provider: NICOLE Estrada    Sex: male  Location:  Location Address:  Location Phone: [phone]  [place] [address] [phone]    YOB: 1980  Age: 41 y.o.   Primary Care Provider Tushar Rutherford MD      Referring Provider: No ref. provider found        Chief Complaint  Fatty Liver (6 mo follow up)    History of Present Illness  Patient initially presented January 2019 with elevated LFTs for 1 year and complained of loose stools.  Ultrasound from November showed hepatomegaly with diffuse fatty infiltration.  Hepatic work-up negative February 2019, ferritin was noted elevated at 869 but with a normal iron profile, Fibrosure showed F0, S3, N1.  Patient does not drink alcohol.  Loose stool since gallbladder removal, patient has tried Florajen3 and noted it aggravated stools and he improved with discontinuing Florajen3; Colestid caused constipation.  Pt also follows w DR Sawant, hx high TG since age 28.  Episode of diverticulitis in March 2020, symptoms also in May 2020 but testing negative    Liver biopsy 9/13/2019: moderate steatosis  Colonoscopy 12/30/2019 for fecal urgency and loose stools: Adequate prep, diverticula in the left side of the colon, 2 mm polyp in the cecum completely removed--adenomatous, grade 1 internal hemorrhoids, random colon biopsies negative     LFTs had increased in 2020 and ALT up to 203, AST 94 June 2020 and patient was referred to Dr. Mcdaniel for second opinion, his only concern was history of elevated ferritin-256 in January 2021 iron actually low at 61 and saturation slightly low at 18%. December 2020 LFTs were normal    Patient was last seen January 2021, he did report explosive bowel movements at times felt triggered by sugary foods or drinks, he had intentionally lost 18 pounds.    Liver ultrasound 5/22/2021 showed hepatomegaly and hepatic steatosis. LFTs stable/improved from  2020.     Pt is doing well. States his bowels are doing ok, states some days good and some days has loose stools. Pt has gained about 8#. Pt takes Hyoscyamine PRN and it helps. PCP recently checked stools and negative and celiac serology negative. She tried to Rx Viberzi but insurance wouldn't cover.  States Dr Sawant dx pt w long QT he saw another specialist and MRI ordered of heart., pending.   Past Medical History:   Diagnosis Date   • Abnormal ECG    • Anxiety    • Arthritis    • Chest pain    • Chronic allergic rhinitis    • Diabetes (CMS/HCC)    • Elevated liver enzymes    • Essential hypertension    • Family history of colon cancer in mother    • Fatty liver    • GERD (gastroesophageal reflux disease)    • Gout    • HBP (high blood pressure)    • Heart disease    • Hepatomegaly    • High cholesterol    • IBS (irritable bowel syndrome)    • Kidney stones    • Loose stools    • Lyme disease 2021       Past Surgical History:   Procedure Laterality Date   • CHOLECYSTECTOMY  2017    Dr. Ray   • GALLBLADDER SURGERY     • HERNIA REPAIR     • LASIK     • TOE SURGERY     • TONSILLECTOMY     • VASECTOMY         Allergies   Allergen Reactions   • Penicillins Anaphylaxis   • Sulfamethoxazole-Trimethoprim Anaphylaxis and Rash     Other reaction(s): sore throat and ulcers   • Colchicine Rash     PT stated body was burning       Family History   Problem Relation Age of Onset   • Heart failure Mother    • Hyperlipidemia Mother    • Heart attack Mother    • Hypertension Mother    • Colon cancer Mother 53   • Stroke Mother    • Heart disease Father    • Stroke Maternal Grandfather    • Heart attack Other    • Heart disease Other    • Heart failure Other    • Hypertension Other    • Hyperlipidemia Other    • Heart disease Sister    • Lung cancer Maternal Uncle         50s   • Heart disease Maternal Uncle    • Heart disease Other         Social History     Tobacco Use   • Smoking status: Never Smoker   • Smokeless tobacco: Never  "Used   • Tobacco comment: second hand smoke exposure-never   Vaping Use   • Vaping Use: Never used   Substance Use Topics   • Alcohol use: Never   • Drug use: Never       Objective     Vital Signs:   /78 (BP Location: Left arm, Patient Position: Sitting, Cuff Size: Large Adult)   Pulse 55   Temp 98.4 °F (36.9 °C) (Temporal)   Ht 182.9 cm (72\")   Wt 110 kg (241 lb 12.8 oz)   BMI 32.79 kg/m²       Physical Exam  Constitutional:       General: The patient is not in acute distress.     Appearance: Normal appearance.   HENT:      Head: Normocephalic and atraumatic.      Nose: Nose normal.   Pulmonary:      Effort: Pulmonary effort is normal. No respiratory distress.   Abdominal:      General: Abdomen is flat.      Palpations: Abdomen is soft. There is no mass.      Tenderness: There is no abdominal tenderness. There is no guarding.   Musculoskeletal:      Cervical back: Neck supple.      Right lower leg: No edema.      Left lower leg: No edema.   Skin:     General: Skin is warm and dry.   Neurological:      General: No focal deficit present.      Mental Status: The patient is alert and oriented to person, place, and time.      Gait: Gait normal.   Psychiatric:         Mood and Affect: Mood normal.         Speech: Speech normal.         Behavior: Behavior normal.         Thought Content: Thought content normal.     Result Review :   The following data was reviewed by: NICOLE Estrada on 09/23/2021:  CMP    CMP 12/17/20 5/28/21 5/28/21 9/3/21     1150 1150    Glucose 101 (A) 96     BUN 16 15     Creatinine 0.92 0.98  1.00   Sodium 138 136     Potassium 4.1 4.5     Chloride 101 98 (A)     Calcium 9.2 9.6     Albumin 4.6 4.9 4.9    Total Bilirubin 0.51 0.56 0.55    Alkaline Phosphatase 42 (A) 43 (A) 44 (A)    AST (SGOT) 28 35 36    ALT (SGPT) 36 60 (A) 61 (A)    (A) Abnormal value       Comments are available for some flowsheets but are not being displayed.           CBC    CBC 12/17/20 5/28/21   WBC " 5.68 7.54   RBC 4.92 5.15   Hemoglobin 14.3 14.7   Hematocrit 41.5 (A) 42.5   MCV 84.3 82.5   MCH 29.1 28.5   MCHC 34.5 34.6   RDW 12.8 13.1   Platelets 186 207   (A) Abnormal value                      Assessment and Plan    Diagnoses and all orders for this visit:    1. Elevated LFTs (Primary)  Comments:  improved/ stable    Orders:  -     CBC (No Diff); Future  -     Hepatic Function Panel; Future  -     Protime-INR; Future  -     US Liver; Future    2. Fatty liver  -     CBC (No Diff); Future  -     Hepatic Function Panel; Future  -     Protime-INR; Future  -     US Liver; Future    3. Functional diarrhea  Comments:  improved, thought r/t post cholecystectomy vs IBS. Normal stool studies.             Follow Up   Return in about 6 months (around 3/23/2022).   Encouraged pt to stay on strict diet and lose weight again  Check labs and US in Nov    Patient was given instructions and counseling regarding his condition or for health maintenance advice. Please see specific information pulled into the AVS if appropriate.

## 2021-10-04 ENCOUNTER — TELEPHONE (OUTPATIENT)
Dept: FAMILY MEDICINE CLINIC | Facility: CLINIC | Age: 41
End: 2021-10-04

## 2021-10-04 NOTE — TELEPHONE ENCOUNTER
----- Message from Eloise Alvarez sent at 9/30/2021 11:44 AM EDT -----  Regarding: FW: Test Results Question  Contact: [phone]    ----- Message -----  From: Timi Hernandez  Sent: 9/28/2021   9:40 AM EDT  To: [facility] Clinical Pool  Subject: Test Results Question                            Dr. Mckeon,    This is bloodwork from [facility] from last week. ALT, AST, Triglycerides, and cholesterol is up high.    I have sent these to [facility], Dr. Lang Sorensen, and DR. Abram Sawant.    Dontrell Hernandez

## 2021-10-04 NOTE — TELEPHONE ENCOUNTER
Please call and let pt know thanks for giving me the updated results. I know he just saw Alla Kaiser in September. She may want to see him sooner based on the liver results. I would encourage him to call their office and inquire about that.   Otherwise I will see him in November for his appt with me.we will go over everything on that visit.  I would also call cardiology office just to make sure they received the cholesterol results.

## 2021-10-05 ENCOUNTER — TELEPHONE (OUTPATIENT)
Dept: CARDIOLOGY | Facility: CLINIC | Age: 41
End: 2021-10-05

## 2021-10-05 NOTE — TELEPHONE ENCOUNTER
Dr. Sorensen would like the patient to see a specialist at [facility] in regards to increased triglycerides and cholesterol levels.       I called and spoke with the patient. He is going to discuss this with his primary cardiologist, Dr. Sawant. If he feels like he cannot treat this appropriately, he will pursue a specialist.

## 2021-10-07 ENCOUNTER — DOCUMENTATION (OUTPATIENT)
Dept: GENETICS | Facility: HOSPITAL | Age: 41
End: 2021-10-07

## 2021-10-08 ENCOUNTER — TELEPHONE (OUTPATIENT)
Dept: FAMILY MEDICINE CLINIC | Facility: CLINIC | Age: 41
End: 2021-10-08

## 2021-10-11 RX ORDER — ATORVASTATIN CALCIUM 10 MG/1
10 TABLET, FILM COATED ORAL NIGHTLY
Qty: 90 TABLET | Refills: 0 | Status: SHIPPED | OUTPATIENT
Start: 2021-10-11 | End: 2021-11-09 | Stop reason: ALTCHOICE

## 2021-10-12 NOTE — PROGRESS NOTES
Timi Hernandez, a 41-year old male was seen for genetic counseling due to a family history of Long QT syndrome (LQTS). Genetic counseling was completed via telehealth. Mr. Hernandez recently had an EKG that demonstrated a prolonged QT interval in the setting of RBBB. Mr. Hernandez was interested in discussing his family history and risk for LQTS. Genetic testing via a comprehensive panel was ordered through [facility], which analyzes 157 genes known to be associated with cardiomyopathy and arrhythmia. 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 These results were discussed with Mr. Hernandez on 10/7/2021.    CARDIAC FAMILY HISTORY: (See attached pedigree)     Son:     Slight prolonged QT interval dx. age 13; Epilepsy dx. 18 months  Mother:    Heart attack in 50s, stent placed  Mat. Uncle:    Pacemaker; currently on a heart transplant list  Mat. Uncle:    Sudden cardiac death at age 55  Mat. Grandfather:   Sudden cardiac death at age 36,  during sleep  Mat. 1st cousin once removed: LQTS confirmed by genetic testing    We do not have medical records regarding the diagnoses in the family. Mr. Hernandez is working to obtain a copy of his maternal 1st cousin once removed genetic test results that are reported to confirm a diagnosis of LQTS.     GENETIC COUNSELING: We began by discussing the features of LQTS.  A prolonged QT interval is most often due to inherited long QT syndrome (LQTS), but can also be induced by various drug exposures, hypokalemia (low potassium) and structural heart defects.  Determining the etiology greatly impacts the medical management and counseling regarding risk to the patient and their family members.  Long QT Syndrome (LQTS) is a common cardiac channelopathy, affecting approximately 2500 individuals in the [place] with 500-1,000 new carriers born each year. Individuals with LQTS are at risk for syncopal events, ventricular fibrillation and aborted cardiac arrest (if the individual is  defibrillated) or sudden death.  While there is no cure, proper medical management including combinations of lifestyle modification, â-blocker therapy and implantable cardioverter-defibrillators (ICDs) can reduce the risks.    Familial LQTS is diagnosed with a combination of measurement of the QT interval on ECG and family history.  The most common type of familial LQTS, also called Lara-Del Toro syndrome, is inherited in an autosomal dominant manner, meaning a single non-working copy of the gene is enough to cause the condition.  The gene may be passed from either the mother or father to either sons or daughters. Children of an individual who has LQTS have a 50% chance of inheriting the disease causing gene mutation.  Familial LQTS displays incomplete penetrance with variable expressivity, meaning that not all individuals who inherit the disease causing gene mutation in a family will display symptoms of the condition and that the symptoms can vary within a family.  Approximately 1/3 of individuals with familial LQTS remain asymptomatic.        A resting ECG is used to diagnose affected individuals within in a family; however, the QT interval on ECG can be indeterminate in 30% of affected individuals.  Additionally, a normal resting ECG has been observed in a few individuals (<<1%) that were known to have Lara-Del Toro syndrome.  Genetic testing identifies a mutation in approximately 75% of patients diagnosed with familial LQTS.  Familial LQTS syndrome demonstrates strong genotype/phenotype correlations, meaning the condition behaves differently depending on the gene responsible.  Genetic testing can help determine not only the risk of a cardiac event, but also the type of trigger that causes the cardiac event (loud noises and the startle response versus exercise versus sleep, etc.).  Additionally, the identification of a mutation would allow other family members to undergo informative genetic testing to assess their  risk for LQTS.    GENETIC TESTING: The risks, benefits and limitations of genetic testing and implications for clinical management following testing were reviewed.  DNA test results can influence decisions regarding screening management for family members.  Also discussed was the possibility of employment and insurance discrimination based on genetic test results and the laws in place to prevent this ([place]), as well as the limitations of these laws.    The implications of a positive or negative test result were discussed.  We discussed the possibility that, in some cases, genetic test results may be informative or may be ambiguous due to the identification of a genetic variant of uncertain significance (VUS). These variants are findings that may or may not impact the function of a gene.  Given his personal history, a negative test result would not eliminate all risk to family members due to the possibility of causative genes that have yet to be identified, or the possibility of a combination of genetic and environmental factors influencing disease.      TEST RESULTS: Genetic testing was positive for a pathogenic mutation in the SCN5A gene.  This result is consistent with a predisposition to [id number] conditions. The SCN5A gene is associated with Long QT syndrome type 3, as well as Brugada syndrome, dilated cardiomyopathy, and atrial fibrillation. The specific mutation identified in Mr. Hernandez has been observed in individuals with Long QT syndrome and Brugada syndrome. Approximately 5-10% of LQTS cases are caused by mutations in the SCN5A gene, and SCN5A is associated with LQTS type 3. The phenotypic symptoms of LQTS type 3 often develop during sleep.    Genetic testing for this pathogenic SCN5A mutation is available to Mr. Hernandez’s first-degree relatives as well as other relatives.  Mr. Hernandez’s siblings and children each have a 50% risk to have inherited the pathogenic mutation and should be offered genetic  counseling and testing.  For relatives found to have the mutation, referral to cardiology would be recommended for annual evaluation.  If Mr. Hernandez’s relatives live in the [place] area, we would be happy to see them in our clinic.  Relatives can request a referral to genetic counseling at [facility], and once a referral is received a coordinator will call to schedule an appointment.  If they do not live nearby, they may contact a genetic counselor near them at the following website: [email or web address] and by clicking on the “Find a Genetic Counselor” link on the page.  Mr. Hernandez is encouraged to provide a copy of his results to relatives so that appropriate specific testing can be pursued.    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 The majority of VUSs are ultimately reclassified to benign changes; therefore, this is not considered a clinically actionable finding at this time.  Testing for VUSs is not recommended for family members.   If these VUSs are ever reclassified, a new report will be issued by the laboratory and released directly to the ordering physician, and our office.  We will then issue a new note to Mr. Hernandez and his physicians. 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 Pseudodeficiency alleles are not known to be associated with disease, meaning this does not affect his risk of being a carrier of Pompe disease (an autosomal recessive glycogen storage disorder). Individuals with pseudodeficiency alleles may exhibit false positive results on related biochemical tests and enzyme tests; however, pseudodeficiency alleles are not known to cause disease.     PLAN: Mr. Hernandez will follow up with his cardiologist regarding these results and for continuation of clinical management. Genetic counseling remains available to Mr. Hernandez and his family, and he is encouraged to contact us if he has any questions or concerns at  [phone].        Valery Gee, MS, [facility] [place] [facility]  Licensed Certified Genetic Counselor       Cc: MD Abram Dhillon MD Andrea Watson, MD

## 2021-10-14 ENCOUNTER — HOSPITAL ENCOUNTER (OUTPATIENT)
Dept: MRI IMAGING | Facility: HOSPITAL | Age: 41
Discharge: HOME | End: 2021-10-14
Admitting: INTERNAL MEDICINE

## 2021-10-14 PROCEDURE — 75561 CARDIAC MRI FOR MORPH W/DYE: CPT

## 2021-10-14 PROCEDURE — 0 GADOBENATE DIMEGLUMINE 529 MG/ML SOLUTION: Performed by: INTERNAL MEDICINE

## 2021-10-14 PROCEDURE — A9577 INJ MULTIHANCE: HCPCS | Performed by: INTERNAL MEDICINE

## 2021-10-14 RX ADMIN — GADOBENATE DIMEGLUMINE 32 ML: 529 INJECTION, SOLUTION INTRAVENOUS at 16:09

## 2021-10-25 ENCOUNTER — TELEPHONE (OUTPATIENT)
Dept: CARDIOLOGY | Facility: CLINIC | Age: 41
End: 2021-10-25

## 2021-11-01 ENCOUNTER — HOSPITAL ENCOUNTER (OUTPATIENT)
Dept: ULTRASOUND IMAGING | Facility: HOSPITAL | Age: 41
Discharge: HOME | End: 2021-11-01
Admitting: NURSE PRACTITIONER

## 2021-11-01 DIAGNOSIS — R79.89 ELEVATED LFTS: ICD-10-CM

## 2021-11-01 DIAGNOSIS — K76.0 FATTY LIVER: ICD-10-CM

## 2021-11-01 PROCEDURE — 76705 ECHO EXAM OF ABDOMEN: CPT

## 2021-11-04 ENCOUNTER — TELEPHONE (OUTPATIENT)
Dept: CARDIOLOGY | Facility: CLINIC | Age: 41
End: 2021-11-04

## 2021-11-04 ENCOUNTER — DOCUMENTATION (OUTPATIENT)
Dept: CARDIOLOGY | Facility: CLINIC | Age: 41
End: 2021-11-04

## 2021-11-04 DIAGNOSIS — I45.3: Primary | ICD-10-CM

## 2021-11-04 DIAGNOSIS — Z82.49 FAMILY HISTORY OF LONG QT SYNDROME: ICD-10-CM

## 2021-11-04 NOTE — TELEPHONE ENCOUNTER
----- Message from Lang Sorensen MD sent at 11/4/2021  9:15 AM EDT -----  He needs 2 wk [facility]

## 2021-11-05 ENCOUNTER — LAB (OUTPATIENT)
Dept: LAB | Facility: HOSPITAL | Age: 41
End: 2021-11-05

## 2021-11-05 DIAGNOSIS — R79.89 ELEVATED LFTS: ICD-10-CM

## 2021-11-05 DIAGNOSIS — K76.0 FATTY LIVER: ICD-10-CM

## 2021-11-05 LAB
ALBUMIN SERPL-MCNC: 4.6 G/DL (ref 3.5–5.2)
ALP SERPL-CCNC: 43 U/L (ref 39–117)
ALT SERPL W P-5'-P-CCNC: 194 U/L (ref 1–41)
AST SERPL-CCNC: 99 U/L (ref 1–40)
BILIRUB CONJ SERPL-MCNC: <0.2 MG/DL (ref 0–0.3)
BILIRUB INDIRECT SERPL-MCNC: ABNORMAL MG/DL
BILIRUB SERPL-MCNC: 0.5 MG/DL (ref 0–1.2)
DEPRECATED RDW RBC AUTO: 42.1 FL (ref 37–54)
ERYTHROCYTE [DISTWIDTH] IN BLOOD BY AUTOMATED COUNT: 13.9 % (ref 12.3–15.4)
HCT VFR BLD AUTO: 42.4 % (ref 37.5–51)
HGB BLD-MCNC: 14.4 G/DL (ref 13–17.7)
INR PPP: 1.02 (ref 2–3)
MCH RBC QN AUTO: 29 PG (ref 26.6–33)
MCHC RBC AUTO-ENTMCNC: 34 G/DL (ref 31.5–35.7)
MCV RBC AUTO: 85.3 FL (ref 79–97)
PLATELET # BLD AUTO: 195 10*3/MM3 (ref 140–450)
PMV BLD AUTO: 10.9 FL (ref 6–12)
PROT SERPL-MCNC: 7.6 G/DL (ref 6–8.5)
PROTHROMBIN TIME: 10.7 SECONDS (ref 9.4–12)
RBC # BLD AUTO: 4.97 10*6/MM3 (ref 4.14–5.8)
WBC # BLD AUTO: 6.77 10*3/MM3 (ref 3.4–10.8)

## 2021-11-05 PROCEDURE — 80076 HEPATIC FUNCTION PANEL: CPT

## 2021-11-05 PROCEDURE — 85610 PROTHROMBIN TIME: CPT

## 2021-11-05 PROCEDURE — 85027 COMPLETE CBC AUTOMATED: CPT

## 2021-11-05 PROCEDURE — 36415 COLL VENOUS BLD VENIPUNCTURE: CPT

## 2021-11-09 ENCOUNTER — TELEPHONE (OUTPATIENT)
Dept: CARDIOLOGY | Facility: CLINIC | Age: 41
End: 2021-11-09

## 2021-11-09 ENCOUNTER — CLINICAL SUPPORT (OUTPATIENT)
Dept: FAMILY MEDICINE CLINIC | Facility: CLINIC | Age: 41
End: 2021-11-09

## 2021-11-09 ENCOUNTER — OFFICE VISIT (OUTPATIENT)
Dept: CARDIOLOGY | Facility: CLINIC | Age: 41
End: 2021-11-09

## 2021-11-09 VITALS
BODY MASS INDEX: 31.83 KG/M2 | WEIGHT: 235 LBS | SYSTOLIC BLOOD PRESSURE: 132 MMHG | HEIGHT: 72 IN | HEART RATE: 71 BPM | OXYGEN SATURATION: 98 % | DIASTOLIC BLOOD PRESSURE: 98 MMHG

## 2021-11-09 DIAGNOSIS — I49.8 BRUGADA SYNDROME: ICD-10-CM

## 2021-11-09 DIAGNOSIS — I45.81 LONG Q-T SYNDROME: Primary | ICD-10-CM

## 2021-11-09 DIAGNOSIS — E53.8 B12 DEFICIENCY: ICD-10-CM

## 2021-11-09 DIAGNOSIS — I45.3: ICD-10-CM

## 2021-11-09 PROCEDURE — 99213 OFFICE O/P EST LOW 20 MIN: CPT | Performed by: INTERNAL MEDICINE

## 2021-11-09 PROCEDURE — 96372 THER/PROPH/DIAG INJ SC/IM: CPT | Performed by: FAMILY MEDICINE

## 2021-11-09 RX ADMIN — CYANOCOBALAMIN 1000 MCG: 1000 INJECTION, SOLUTION INTRAMUSCULAR; SUBCUTANEOUS at 14:32

## 2021-11-09 NOTE — PROGRESS NOTES
Timi Hernandez  1980  [phone]    11/09/2021    [facility] CARDIOLOGY     Referring Provider: No ref. provider found     Tushar Rutherford MD  [address] [address]  [place] [address]    Chief Complaint   Patient presents with   • Long QT Syndrome       Problem List:   1.  Long QT 3 syndrome/Possible Brugada Syndrome  a. Uncle with reported long QT syndrome. Grandfather with sudden cardiac death at age 36   b. Echo 3/29/2021 ejection fraction 55%, no significant valvular heart issues.  c. Stress Test 3/29/2021 good exercise tolerance, and exercise EKG negative for ischemia, atypical chest pain and abdominal pain with exercise, no arrhythmias seen.  Study is consistent with no reversible ischemia at rest or with exercise.  This indicates a low probability of coronary artery disease in this individual  d. 3/22/2021 48-hour Holter monitor normal sinus rhythm average heart rate of 86 bpm, 8 episodes of PACs, one 5 beat run of supraventricular tachycardia in the SVT consistent with ectopic atrial tachycardia  e. 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 Cardiac MRI 10/15/2021: Normal LV systolic function EF 68%, no CMR evidence for scar or fibrosis, normal size RV with mildly reduced function of 46%  2. RBBB  3. HTN  4. HLD   5. SANCHEZ   6. GERD     Allergies  Allergies   Allergen Reactions   • Penicillins Anaphylaxis   • Sulfamethoxazole-Trimethoprim Anaphylaxis and Rash     Other reaction(s): sore throat and ulcers   • Colchicine Rash     PT stated body was burning       Current Medications    Current Outpatient Medications:   •  aspirin 81 MG EC tablet, Take 81 mg by mouth Daily., Disp: , Rfl:   •  cetirizine (ZyrTEC Allergy) 10 MG tablet, Zyrtec 10 mg oral tablet take 1 tablet (10 mg) by oral route once daily   Active, Disp: , Rfl:   •  clonazePAM (KlonoPIN) 1 MG tablet, Take 1 mg by mouth Daily., Disp: , Rfl:   •  fenofibrate 160 MG tablet, Take 1  "tablet by mouth Daily., Disp: 30 tablet, Rfl: 6  •  hyoscyamine (LEVSIN) 0.125 MG SL tablet, Take 1 tablet by mouth Every 4 (Four) Hours As Needed., Disp: , Rfl:   •  icosapent ethyl (Vascepa) 1 g capsule capsule, Vascepa 1 gram oral capsule 4 times a day   Suspended, Disp: , Rfl:   •  nadolol (Corgard) 20 MG tablet, Take 1 tablet by mouth Every 12 (Twelve) Hours for 30 days. Take at bed time, Disp: 60 tablet, Rfl: 5  •  atorvastatin (LIPITOR) 10 MG tablet, Take 1 tablet by mouth Every Night., Disp: 90 tablet, Rfl: 0    Current Facility-Administered Medications:   •  cyanocobalamin injection 1,000 mcg, 1,000 mcg, Intramuscular, Q28 Days, Tushar Rutherford MD, 1,000 mcg at 08/27/21 1042    History of Present Illness:      Pt presents for follow up of long QT syndrome with positive genetic mutation of the SCN5A gene. Since we last saw the pt, he had a normal cardiac MRI.  He called our office a few days ago reporting 2 syncopal episodes.  He is wearing an event monitor. He states he has \"spells\" in which he feels dizzy, lightheaded, and about to pass out. This occurs usually right before dinner time around 5 or 6 pm. Of note, he started taking his Nadolol twice daily per Dr. Pettit's instructions in September, and prior to that was having HRs in th 40s. He started having dizziness after the increase in medication. Since wearing the monitor, he has been taking Nadolol once per day. Dizziness has somewhat improved. He has instances when he feels like he is very hot and anxious with feeling his heart racing. Somewhat relieved by taking his anxiety medication. Denies any hospitalizations, ER visits, bleeding, or TIA/CVA symptoms. Overall feels well.    ROS:  General:  Denies fatigue, weight gain or loss  Cardiovascular:  Denies CP, PND, syncope, near syncope, edema or palpitations.  Pulmonary:  Denies XIE, cough, or wheezing      Vitals:    11/09/21 1341   BP: 132/98   BP Location: Left arm   Patient Position: Sitting " "  Pulse: 71   SpO2: 98%   Weight: 107 kg (235 lb)   Height: 182.9 cm (72\")     Body mass index is 31.87 kg/m².  PE:  General: NAD  Neck: no JVD, no carotid bruits, no TM  Heart RRR, NL S1, S2, S4 present, no rubs, murmurs  Lungs: CTA, no wheezes, rhonchi, or rales  Abd: soft, non-tender, NL BS  Ext: No musculoskeletal deformities, no edema, cyanosis, or clubbing  Psych: normal mood and affect    Diagnostic Data:      Procedures    1. Long Q-T syndrome    2. Brugada syndrome    3. Right bundle branch block (RBBB), 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)        Plan:  1. 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  - patient with recent syncopal episodes and palpitations.   - will follow up on event monitor that he is currently of wearing to evaluate his symptomatology.  May need procainamide challenge test plus or minus EP study for risk ratification of malignant ventricular arrhythmias if Brugada syndrome more likely than long QT 3.      F/up in 6 months    Scribed for Lang Sorensen MD by Shauna Whitehead PA-C. 11/9/2021  14:09 EST     I, Lang Sorensen MD, personally performed the services described in this documentation as scribed by the above named individual in my presence, and it is both accurate and complete.  11/9/2021  14:10 EST    "

## 2021-11-09 NOTE — TELEPHONE ENCOUNTER
Patient called asking if he has labs ordered for upcoming appointment.  No labs are ordered.  Checked Jovany as well in case they weren't brought over to appt in Epic.  Patient is aware.

## 2021-11-10 DIAGNOSIS — R79.89 ELEVATED LFTS: Primary | ICD-10-CM

## 2021-11-16 PROBLEM — K76.0 FATTY LIVER: Status: ACTIVE | Noted: 2021-11-16

## 2021-11-16 PROBLEM — I49.8 BRUGADA SYNDROME: Status: ACTIVE | Noted: 2021-11-16

## 2021-11-16 PROBLEM — J30.9 ALLERGIC RHINITIS: Status: ACTIVE | Noted: 2021-11-16

## 2021-11-16 PROBLEM — I10 ESSENTIAL HYPERTENSION: Status: ACTIVE | Noted: 2021-11-16

## 2021-11-16 PROBLEM — K21.9 GERD (GASTROESOPHAGEAL REFLUX DISEASE): Status: ACTIVE | Noted: 2021-11-16

## 2021-11-16 PROBLEM — M19.90 ARTHRITIS: Status: ACTIVE | Noted: 2021-11-16

## 2021-11-16 PROBLEM — Z82.49 FAMILY HISTORY OF LONG QT SYNDROME: Status: RESOLVED | Noted: 2021-05-11 | Resolved: 2021-11-16

## 2021-11-16 NOTE — PATIENT INSTRUCTIONS
Right Bundle Branch Block    Right bundle branch block (RBBB) is a problem with the way that electrical impulses pass through the heart (electrical conduction abnormality). The heart depends on an electrical pulse to beat normally. The electrical signal for a heartbeat starts in the upper chambers of the heart (atria) and then travels to the two lower chambers (left and rightventricles). An RBBB is a partial or complete block of the pathway that carries the signal to the right ventricle. If you have RBBB, the right side of your heart beats a little more slowly than the left side.  RBBB may be a warning of heart disease or a lung problem.  What are the causes?  This condition may be caused by:  · Heart attack (myocardial infarction).  · Being born with a heart defect (congenital heart disease).  · A blood clot that flows into the lung (pulmonary embolism).  · Infection of heart muscle (myocarditis).  · High blood pressure.  In some cases, the cause may not be known.  What increases the risk?  The following factors may make you more likely to develop this condition:  · Being male.  · Being 50 years of age or older.  · Having heart disease.  · Having had a heart attack or heart surgery.  · Having an enlarged heart.  · Having a hole in the walls between the chambers of the heart (septal defect).  What are the signs or symptoms?  This condition does not typically cause symptoms.  How is this diagnosed?  This condition may be diagnosed based on an electrocardiogram (ECG). It is often diagnosed when an ECG is done as part of a routine physical.  You may also have imaging tests to find out more about your condition. These may include:  · Chest X-rays.  · Echocardiogram.  How is this treated?  Treatment may not be needed for this condition if you do not have symptoms or any other heart problems. However, you may need to see your health care provider more often because RBBB can be a warning sign of future heart or lung  problems. You may need treatment for another condition that may be causing RBBB.  Follow these instructions at home:  Lifestyle    · Follow instructions from your health care provider about eating or drinking restrictions.  · Follow a heart-healthy diet and maintain a healthy weight. Work with a dietitian to create an eating plan that is best for you.  · Do not use any products that contain nicotine or tobacco, such as cigarettes, e-cigarettes, and chewing tobacco. If you need help quitting, ask your health care provider.    Activity  · Get regular exercise as told by your health care provider.  · Return to your normal activities as told by your health care provider. Ask your health care provider what activities are safe for you.  General instructions  · Take over-the-counter and prescription medicines only as told by your health care provider.  · Keep all follow-up visits as told by your health care provider. This is important.  Contact a health care provider if:  · You are light-headed.  · You faint.  Get help right away if:  · You have chest pain.  · You have trouble breathing.  These symptoms may represent a serious problem that is an emergency. Do not wait to see if the symptoms will go away. Get medical help right away. Call your local emergency services (911 in the [place] Do not drive yourself to the hospital.   Summary  · For the heart to beat normally, an electrical signal must travel to the heart's lower right chamber. Right bundle branch block (RBBB) is a partial or complete block of the pathway that carries that signal.  · This condition does not typically cause symptoms.  · Treatment may not be needed for RBBB if you do not have symptoms or any other heart problems.  · You may need to see your health care provider more often because RBBB can be a warning sign of future heart or lung problems.  This information is not intended to replace advice given to you by your health care provider. Make sure you  discuss any questions you have with your health care provider.  Document Revised: 06/16/2020 Document Reviewed: 06/16/2020  Elsevier Patient Education © 2021 [employer]  Left Bundle Branch Block    Left bundle branch block (LBBB) is a problem with the way that electrical impulses pass through the heart (electrical conduction abnormality). The heart depends on an electrical pulse to beat normally. The electrical signal for a heartbeat starts in the upper chambers of the heart (atria) and then travels to the two lower chambers (left and rightventricles). An LBBB is a partial or complete block of the pathway that carries the signal to the left ventricle. If you have LBBB, the left side of your heart does not beat normally.  LBBB may be a warning sign of heart disease.  What are the causes?  This condition may be caused by:  · Heart disease.  · Disease of the arteries in the heart (arteriosclerosis).  · Stiffening or weakening of heart muscle (cardiomyopathy).  · Infection of heart muscle (myocarditis).  · High blood pressure.  In some cases, the cause may not be known.  What increases the risk?  The following factors may make you more likely to develop this condition:  · Being male.  · Being 50 years of age or older.  · Having heart disease.  · Having had a heart attack or heart surgery.  What are the signs or symptoms?  This condition may not cause any symptoms. If you do have symptoms, they may include:  · Feeling dizzy or light-headed.  · Fainting.  How is this diagnosed?  This condition may be diagnosed based on an electrocardiogram (ECG). It is often diagnosed when an ECG is done as part of a routine physical or to help find the cause of fainting spells.  You may also have imaging tests to find out more about your condition. These may include:  · Chest X-rays.  · Echocardiogram.  How is this treated?  If you do not have symptoms or any other type of heart disease, you may not need treatment for this condition.  However, you may need to see your health care provider more often because LBBB can be a warning sign of future heart problems. You may get treatment for other heart problems or high blood pressure.  If LBBB causes symptoms or other heart problems, you may need to have an electrical device (pacemaker) implanted under the skin of your chest. A pacemaker sends electrical signals to your heart to keep it beating normally.  Follow these instructions at home:  Lifestyle    · Follow instructions from your health care provider about eating or drinking restrictions.  · Follow a heart-healthy diet and maintain a healthy weight. Work with a dietitian to create an eating plan that is best for you.  · Do not use any products that contain nicotine or tobacco, such as cigarettes, e-cigarettes, and chewing tobacco. If you need help quitting, ask your health care provider.    Activity  · Get regular exercise as told by your health care provider.  · Return to your normal activities as told by your health care provider. Ask your health care provider what activities are safe for you.  General instructions  · Take over-the-counter and prescription medicines only as told by your health care provider.  · Keep all follow-up visits as told by your health care provider. This is important.  Contact a health care provider if:  · You feel light-headed.  · You faint.  Get help right away if:  · You have chest pain.  · You have trouble breathing.  These symptoms may represent a serious problem that is an emergency. Do not wait to see if the symptoms will go away. Get medical help right away. Call your local emergency services (911 in the [place] Do not drive yourself to the hospital.  Summary  · For the heart to beat normally, an electrical signal must travel to the lower left chamber of the heart. Left bundle branch block (LBBB) is a partial or complete block of the pathway that carries that signal.  · This condition may not cause any symptoms.  In some cases, a person may feel dizzy or light-headed or may faint.  · Treatment may not be needed for LBBB if you do not have symptoms or any other type of heart disease.  · You may need to see your health care provider more often because LBBB can be a warning sign of future heart problems.  This information is not intended to replace advice given to you by your health care provider. Make sure you discuss any questions you have with your health care provider.  Document Revised: 04/07/2021 Document Reviewed: 06/16/2020  Elsevier Patient Education © 2021 [email or web address]    Cholesterol Content in Foods  Cholesterol is a waxy, fat-like substance that helps to carry fat in the blood. The body needs cholesterol in small amounts, but too much cholesterol can cause damage to the arteries and heart.  Most people should eat less than 200 milligrams (mg) of cholesterol a day.  Foods with cholesterol    Cholesterol is found in animal-based foods, such as meat, seafood, and dairy. Generally, low-fat dairy and lean meats have less cholesterol than full-fat dairy and fatty meats. The milligrams of cholesterol per serving (mg per serving) of common cholesterol-containing foods are listed below.  Meat and other proteins  · Egg -- one large whole egg has 186 mg.  · Veal shank -- 4 oz has 141 mg.  · Lean ground turkey (93% lean) -- 4 oz has 118 mg.  · Fat-trimmed lamb loin -- 4 oz has 106 mg.  · Lean ground beef (90% lean) -- 4 oz has 100 mg.  · Lobster -- 3.5 oz has 90 mg.  · Pork loin chops -- 4 oz has 86 mg.  · Canned salmon -- 3.5 oz has 83 mg.  · Fat-trimmed beef top loin -- 4 oz has 78 mg.  · Frankfurter -- 1 william (3.5 oz) has 77 mg.  · Crab -- 3.5 oz has 71 mg.  · Roasted chicken without skin, white meat -- 4 oz has 66 mg.  · Light bologna -- 2 oz has 45 mg.  · Deli-cut turkey -- 2 oz has 31 mg.  · Canned tuna -- 3.5 oz has 31 mg.  · Vincent -- 1 oz has 29 mg.  · Oysters and mussels (raw) -- 3.5 oz has 25 mg.  · Mackerel -- 1 oz  has 22 mg.  · Trout -- 1 oz has 20 mg.  · Pork sausage -- 1 link (1 oz) has 17 mg.  · [place] -- 1 oz has 16 mg.  · Tilapia -- 1 oz has 14 mg.  Dairy  · Soft-serve ice cream -- ½ cup (4 oz) has 103 mg.  · Whole-milk yogurt -- 1 cup (8 oz) has 29 mg.  · Cheddar cheese -- 1 oz has 28 mg.  · American cheese -- 1 oz has 28 mg.  · Whole milk -- 1 cup (8 oz) has 23 mg.  · 2% milk -- 1 cup (8 oz) has 18 mg.  · Cream cheese -- 1 tablespoon (Tbsp) has 15 mg.  · Cottage cheese -- ½ cup (4 oz) has 14 mg.  · Low-fat (1%) milk -- 1 cup (8 oz) has 10 mg.  · Sour cream -- 1 Tbsp has 8.5 mg.  · Low-fat yogurt -- 1 cup (8 oz) has 8 mg.  · Nonfat Greek yogurt -- 1 cup (8 oz) has 7 mg.  · Half-and-half cream -- 1 Tbsp has 5 mg.  Fats and oils  · Cod liver oil -- 1 tablespoon (Tbsp) has 82 mg.  · Butter -- 1 Tbsp has 15 mg.  · Lard -- 1 Tbsp has 14 mg.  · Vincent grease -- 1 Tbsp has 14 mg.  · Mayonnaise -- 1 Tbsp has 5-10 mg.  · Margarine -- 1 Tbsp has 3-10 mg.  Exact amounts of cholesterol in these foods may vary depending on specific ingredients and brands.  Foods without cholesterol  Most plant-based foods do not have cholesterol unless you combine them with a food that has cholesterol. Foods without cholesterol include:  · Grains and cereals.  · Vegetables.  · Fruits.  · Vegetable oils, such as olive, canola, and sunflower oil.  · Legumes, such as peas, beans, and lentils.  · Nuts and seeds.  · Egg whites.  Summary  · The body needs cholesterol in small amounts, but too much cholesterol can cause damage to the arteries and heart.  · Most people should eat less than 200 milligrams (mg) of cholesterol a day.  This information is not intended to replace advice given to you by your health care provider. Make sure you discuss any questions you have with your health care provider.  Document Revised: 05/10/2021 Document Reviewed: 05/10/2021  Elsevier Patient Education © 2021 Elsevier Inc.

## 2021-11-17 ENCOUNTER — OFFICE VISIT (OUTPATIENT)
Dept: CARDIOLOGY | Facility: CLINIC | Age: 41
End: 2021-11-17

## 2021-11-17 VITALS
HEART RATE: 47 BPM | HEIGHT: 71 IN | DIASTOLIC BLOOD PRESSURE: 80 MMHG | WEIGHT: 234 LBS | BODY MASS INDEX: 32.76 KG/M2 | SYSTOLIC BLOOD PRESSURE: 141 MMHG

## 2021-11-17 DIAGNOSIS — I45.3: ICD-10-CM

## 2021-11-17 DIAGNOSIS — I10 ESSENTIAL HYPERTENSION: ICD-10-CM

## 2021-11-17 DIAGNOSIS — I49.8 BRUGADA SYNDROME: ICD-10-CM

## 2021-11-17 DIAGNOSIS — I45.81 LONG Q-T SYNDROME: ICD-10-CM

## 2021-11-17 DIAGNOSIS — E78.5 DYSLIPIDEMIA: Primary | ICD-10-CM

## 2021-11-17 PROCEDURE — 99214 OFFICE O/P EST MOD 30 MIN: CPT | Performed by: INTERNAL MEDICINE

## 2021-11-17 RX ORDER — ATORVASTATIN CALCIUM 10 MG/1
10 TABLET, FILM COATED ORAL DAILY
COMMUNITY
End: 2021-12-01

## 2021-11-17 NOTE — ASSESSMENT & PLAN NOTE
Patient currently undergoing work-up and risk assessment to decide if prophylactic ICD needed by electrophysiology

## 2021-11-17 NOTE — ASSESSMENT & PLAN NOTE
Patient with persistently elevated triglyceride levels went over him at length nutritional changes given his current work-up for Brugada and long QT 3 syndrome he cannot engage in strenuous activity at this point but hopefully he can increase his aerobic activity in the future as well to help improve triglycerides.  In the meantime recommended trying Trilipix 130 mg daily we will hold off on Lipitor for the time being and continue with Vascepa repeat lipids and LFTs in 3 months

## 2021-11-17 NOTE — PROGRESS NOTES
Chief Complaint  Hyperlipidemia, QT prolongation (wearin a monitor through [facility] (3 days left)), Dizziness, and Syncope (3 weeks ago)    Subjective    Patient symptom wise has been doing well no problems with tachycardia syncope or presyncopal episodes    Past Medical History:   Diagnosis Date   • Anxiety    • Arthritis    • Brugada syndrome 11/16/2021   • Chronic allergic rhinitis    • Essential hypertension    • Family history of colon cancer in mother    • Fatty liver    • GERD (gastroesophageal reflux disease)    • Gout    • Hepatomegaly    • IBS (irritable bowel syndrome)    • Kidney stones    • Lyme disease 2021   • Right bundle branch block (RBBB), 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)          Current Outpatient Medications:   •  aspirin 81 MG EC tablet, Take 81 mg by mouth Daily., Disp: , Rfl:   •  cetirizine (ZyrTEC Allergy) 10 MG tablet, Zyrtec 10 mg oral tablet take 1 tablet (10 mg) by oral route once daily   Active, Disp: , Rfl:   •  clonazePAM (KlonoPIN) 1 MG tablet, Take 1 mg by mouth As Needed., Disp: , Rfl:   •  hyoscyamine (LEVSIN) 0.125 MG SL tablet, Take 1 tablet by mouth Every 4 (Four) Hours As Needed., Disp: , Rfl:   •  icosapent ethyl (Vascepa) 1 g capsule capsule, 2 po bid, Disp: , Rfl:   •  nadolol (Corgard) 20 MG tablet, Take 1 tablet by mouth Every 12 (Twelve) Hours for 30 days. Take at bed time, Disp: 60 tablet, Rfl: 5  •  atorvastatin (LIPITOR) 10 MG tablet, Take 10 mg by mouth Daily., Disp: , Rfl:   •  choline fenofibrate (Trilipix) 135 MG capsule, Take 1 capsule by mouth Daily., Disp: 90 capsule, Rfl: 3    Current Facility-Administered Medications:   •  cyanocobalamin injection 1,000 mcg, 1,000 mcg, Intramuscular, Q28 Days, Tushar Rutherford MD, 1,000 mcg at 11/09/21 1432    Medications Discontinued During This Encounter   Medication Reason   • fenofibrate 160 MG tablet Discontinued by another clinician     Allergies   Allergen Reactions   •  "Penicillins Anaphylaxis   • Sulfamethoxazole-Trimethoprim Anaphylaxis and Rash     Other reaction(s): sore throat and ulcers   • Colchicine Rash     PT stated body was burning        Social History     Tobacco Use   • Smoking status: Never Smoker   • Smokeless tobacco: Never Used   • Tobacco comment: second hand smoke exposure-never   Vaping Use   • Vaping Use: Never used   Substance Use Topics   • Alcohol use: Never   • Drug use: Never       Family History   Problem Relation Age of Onset   • Heart failure Mother         Heart attack in her early 50s   • Hyperlipidemia Mother    • Heart attack Mother    • Hypertension Mother    • Colon cancer Mother 53   • Stroke Mother    • Heart disease Father    • Stroke Maternal Grandfather    • Heart disease Maternal Grandfather    • Heart attack Other    • Heart disease Other    • Heart failure Other    • Hypertension Other    • Hyperlipidemia Other    • Heart disease Sister    • Lung cancer Maternal Uncle         50s   • Heart disease Maternal Uncle         Objective     /80   Pulse (!) 47   Ht 180.3 cm (71\")   Wt 106 kg (234 lb)   BMI 32.64 kg/m²       Physical Exam    General Appearance:   · no acute distress  · Alert and oriented x3  HENT:   · lips not cyanotic  · Atraumatic  Neck:  · No jvd   · supple  Respiratory:  · no respiratory distress  · normal breath sounds  · no rales  Cardiovascular:  · Regular rate and rhythm  · no S3, no S4   · no murmur  · no rub  Extremities  · No cyanosis  · lower extremity edema: none    Skin:   · warm, dry  · No rashes      Result Review :     No results found for: PROBNP  CMP    CMP 5/28/21 5/28/21 9/3/21 11/5/21    1150 1150     Glucose 96      BUN 15      Creatinine 0.98  1.00    Sodium 136      Potassium 4.5      Chloride 98 (A)      Calcium 9.6      Albumin 4.9 4.9  4.60   Total Bilirubin 0.56 0.55  0.5   Alkaline Phosphatase 43 (A) 44 (A)  43   AST (SGOT) 35 36  99 (A)   ALT (SGPT) 60 (A) 61 (A)  194 (A)   (A) Abnormal " value       Comments are available for some flowsheets but are not being displayed.           CBC w/diff    CBC w/Diff 12/17/20 5/28/21 11/5/21   WBC 5.68 7.54 6.77   RBC 4.92 5.15 4.97   Hemoglobin 14.3 14.7 14.4   Hematocrit 41.5 (A) 42.5 42.4   MCV 84.3 82.5 85.3   MCH 29.1 28.5 29.0   MCHC 34.5 34.6 34.0   RDW 12.8 13.1 13.9   Platelets 186 207 195   Neutrophil Rel % 48.0 46.0    Lymphocyte Rel % 42.4 45.2 (A)    Monocyte Rel % 7.0 6.5    Eosinophil Rel % 1.8 1.6    Basophil Rel % 0.4 0.3    (A) Abnormal value             Lab Results   Component Value Date    TSH 1.540 05/28/2021      No results found for: FREET4   No results found for: DDIMERQUANT  No results found for: MG   No results found for: DIGOXIN   No results found for: TROPONINT        Lipid Panel    Lipid Panel 12/17/20 5/28/21 5/28/21     1150 1150   Total Cholesterol 199 222 (A)    Triglycerides 338 (A) 551 (A)    HDL Cholesterol 32 (A) 30 (A)    VLDL Cholesterol 68 (A)     LDL Cholesterol  99  92   (A) Abnormal value       Comments are available for some flowsheets but are not being displayed.           No results found for: POCTROP                   Diagnoses and all orders for this visit:    1. Dyslipidemia (Primary)  Assessment & Plan:  Patient with persistently elevated triglyceride levels went over him at length nutritional changes given his current work-up for Brugada and long QT 3 syndrome he cannot engage in strenuous activity at this point but hopefully he can increase his aerobic activity in the future as well to help improve triglycerides.  In the meantime recommended trying Trilipix 130 mg daily we will hold off on Lipitor for the time being and continue with Vascepa repeat lipids and LFTs in 3 months    Orders:  -     Hepatic Function Panel; Future  -     Lipid Panel; Future    2. Right bundle branch block (RBBB), 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)    3. Long Q-T syndrome    4. Brugada  syndrome  Assessment & Plan:  Patient currently undergoing work-up and risk assessment to decide if prophylactic ICD needed by electrophysiology      5. Essential hypertension  Assessment & Plan:  Blood pressure controlled continue with nadolol 20 mg daily      Other orders  -     choline fenofibrate (Trilipix) 135 MG capsule; Take 1 capsule by mouth Daily.  Dispense: 90 capsule; Refill: 3          Follow Up     Return in about 6 months (around 5/17/2022).          Patient was given instructions and counseling regarding his condition or for health maintenance advice. Please see specific information pulled into the AVS if appropriate.

## 2021-11-22 ENCOUNTER — LAB (OUTPATIENT)
Dept: LAB | Facility: HOSPITAL | Age: 41
End: 2021-11-22

## 2021-11-22 DIAGNOSIS — R79.89 ELEVATED LFTS: ICD-10-CM

## 2021-11-22 LAB
ALBUMIN SERPL-MCNC: 4.8 G/DL (ref 3.5–5.2)
ALP SERPL-CCNC: 47 U/L (ref 39–117)
ALT SERPL W P-5'-P-CCNC: 165 U/L (ref 1–41)
AST SERPL-CCNC: 82 U/L (ref 1–40)
BILIRUB CONJ SERPL-MCNC: <0.2 MG/DL (ref 0–0.3)
BILIRUB INDIRECT SERPL-MCNC: ABNORMAL MG/DL
BILIRUB SERPL-MCNC: 0.6 MG/DL (ref 0–1.2)
INR PPP: 0.95 (ref 2–3)
PROT SERPL-MCNC: 7.4 G/DL (ref 6–8.5)
PROTHROMBIN TIME: 10.1 SECONDS (ref 9.4–12)

## 2021-11-22 PROCEDURE — 80076 HEPATIC FUNCTION PANEL: CPT

## 2021-11-22 PROCEDURE — 85610 PROTHROMBIN TIME: CPT

## 2021-11-22 PROCEDURE — 36415 COLL VENOUS BLD VENIPUNCTURE: CPT

## 2021-11-24 ENCOUNTER — HOSPITAL ENCOUNTER (OUTPATIENT)
Dept: GENERAL RADIOLOGY | Facility: HOSPITAL | Age: 41
Discharge: HOME | End: 2021-11-24

## 2021-11-24 ENCOUNTER — TRANSCRIBE ORDERS (OUTPATIENT)
Dept: LAB | Facility: HOSPITAL | Age: 41
End: 2021-11-24

## 2021-11-24 ENCOUNTER — LAB (OUTPATIENT)
Dept: LAB | Facility: HOSPITAL | Age: 41
End: 2021-11-24

## 2021-11-24 ENCOUNTER — TRANSCRIBE ORDERS (OUTPATIENT)
Dept: GENERAL RADIOLOGY | Facility: HOSPITAL | Age: 41
End: 2021-11-24

## 2021-11-24 DIAGNOSIS — Z79.899 ENCOUNTER FOR LONG-TERM (CURRENT) USE OF OTHER MEDICATIONS: ICD-10-CM

## 2021-11-24 DIAGNOSIS — M79.673 PAIN OF FOOT, UNSPECIFIED LATERALITY: Primary | ICD-10-CM

## 2021-11-24 DIAGNOSIS — M79.673 PAIN OF FOOT, UNSPECIFIED LATERALITY: ICD-10-CM

## 2021-11-24 DIAGNOSIS — E79.0 HYPERURICEMIA: Primary | ICD-10-CM

## 2021-11-24 DIAGNOSIS — M54.2 CERVICAL SPINE PAIN: ICD-10-CM

## 2021-11-24 DIAGNOSIS — G56.90 NEUROPATHY OF UPPER EXTREMITY, UNSPECIFIED LATERALITY: ICD-10-CM

## 2021-11-24 DIAGNOSIS — E79.0 HYPERURICEMIA: ICD-10-CM

## 2021-11-24 LAB
BUN SERPL-MCNC: 12 MG/DL (ref 6–20)
CREAT SERPL-MCNC: 0.95 MG/DL (ref 0.76–1.27)
GFR SERPL CREATININE-BSD FRML MDRD: 87 ML/MIN/1.73
URATE SERPL-MCNC: 7.4 MG/DL (ref 3.4–7)

## 2021-11-24 PROCEDURE — 82565 ASSAY OF CREATININE: CPT

## 2021-11-24 PROCEDURE — 73630 X-RAY EXAM OF FOOT: CPT

## 2021-11-24 PROCEDURE — 36415 COLL VENOUS BLD VENIPUNCTURE: CPT

## 2021-11-24 PROCEDURE — 84550 ASSAY OF BLOOD/URIC ACID: CPT

## 2021-11-24 PROCEDURE — 72040 X-RAY EXAM NECK SPINE 2-3 VW: CPT

## 2021-11-24 PROCEDURE — 84520 ASSAY OF UREA NITROGEN: CPT

## 2021-11-30 ENCOUNTER — OFFICE VISIT (OUTPATIENT)
Dept: FAMILY MEDICINE CLINIC | Facility: CLINIC | Age: 41
End: 2021-11-30

## 2021-11-30 VITALS
TEMPERATURE: 98.4 F | SYSTOLIC BLOOD PRESSURE: 132 MMHG | BODY MASS INDEX: 32.12 KG/M2 | HEART RATE: 74 BPM | DIASTOLIC BLOOD PRESSURE: 83 MMHG | WEIGHT: 237.1 LBS | HEIGHT: 72 IN | OXYGEN SATURATION: 97 %

## 2021-11-30 DIAGNOSIS — Z00.00 WELLNESS EXAMINATION: ICD-10-CM

## 2021-11-30 DIAGNOSIS — Z86.19 HISTORY OF LYME DISEASE: ICD-10-CM

## 2021-11-30 DIAGNOSIS — E78.2 MIXED HYPERLIPIDEMIA: ICD-10-CM

## 2021-11-30 DIAGNOSIS — R53.83 FATIGUE, UNSPECIFIED TYPE: ICD-10-CM

## 2021-11-30 DIAGNOSIS — E53.8 B12 DEFICIENCY: ICD-10-CM

## 2021-11-30 PROCEDURE — 99396 PREV VISIT EST AGE 40-64: CPT | Performed by: FAMILY MEDICINE

## 2021-11-30 PROCEDURE — 96372 THER/PROPH/DIAG INJ SC/IM: CPT | Performed by: FAMILY MEDICINE

## 2021-11-30 RX ADMIN — CYANOCOBALAMIN 1000 MCG: 1000 INJECTION, SOLUTION INTRAMUSCULAR; SUBCUTANEOUS at 11:43

## 2021-12-01 ENCOUNTER — TELEPHONE (OUTPATIENT)
Dept: FAMILY MEDICINE CLINIC | Facility: CLINIC | Age: 41
End: 2021-12-01

## 2021-12-01 NOTE — PROGRESS NOTES
"  Chief Complaint   Patient presents with   • CPX     Subjective   Timi Hernandez is a 41 y.o. male who presents to the office for CPX/go over maintenance.     The following portions of the patient's history were reviewed and updated as appropriate: allergies, current medications, past family history, past medical history, past social history, past surgical history and problem list.    Review of Systems   Constitutional: Negative for chills, fever and unexpected weight loss.   Respiratory: Negative for cough, chest tightness and shortness of breath.    Cardiovascular: Negative for chest pain and palpitations.   Gastrointestinal: Negative for abdominal pain, constipation, diarrhea, nausea and vomiting.        Objective   Vitals:    11/30/21 0810   BP: 132/83   Pulse: 74   Temp: 98.4 °F (36.9 °C)   SpO2: 97%   Weight: 108 kg (237 lb 1.6 oz)   Height: 182.9 cm (72\")     Body mass index is 32.16 kg/m².  Physical Exam  Vitals reviewed.   Constitutional:       General: He is not in acute distress.     Appearance: Normal appearance. He is not ill-appearing.   HENT:      Head: Normocephalic.   Eyes:      Conjunctiva/sclera: Conjunctivae normal.   Cardiovascular:      Rate and Rhythm: Normal rate and regular rhythm.   Pulmonary:      Effort: Pulmonary effort is normal.      Breath sounds: Normal breath sounds.   Skin:     Findings: No lesion or rash.   Neurological:      Mental Status: He is alert and oriented to person, place, and time.   Psychiatric:         Mood and Affect: Mood normal.          Assessment/Plan   Diagnoses and all orders for this visit:    1. Wellness examination    2. B12 deficiency  -     Vitamin D 25 Hydroxy; Future    3. Fatigue, unspecified type  -     Lyme Disease IgG/IgM Antibodies; Future  -     Vitamin B12 & Folate; Future    4. History of Lyme disease  -     Lyme Disease IgG/IgM Antibodies; Future    5. Mixed hyperlipidemia  -     CBC Auto Differential; Future  -     Comprehensive " Metabolic Panel; Future  -     Lipid Panel; Future  -     TSH; Future           F/u as directed.  b12 shot given today  He still needs to see Neurologist in regards to previous discussions with his health/symptoms.   He is establish with Cardiologists. Still undergoing studies/tests.  Lyme titer will be rechecked.      Return in about 3 months (around 2/28/2022).   PHQ-2/PHQ-9 Depression Screening 7/26/2021   Little interest or pleasure in doing things 3   Feeling down, depressed, or hopeless 3   Trouble falling or staying asleep, or sleeping too much 3   Feeling tired or having little energy 3   Poor appetite or overeating 2   Feeling bad about yourself - or that you are a failure or have let yourself or your family down 3   Trouble concentrating on things, such as reading the newspaper or watching television 0   Moving or speaking so slowly that other people could have noticed. Or the opposite - being so fidgety or restless that you have been moving around a lot more than usual 1   Thoughts that you would be better off dead, or of hurting yourself in some way 0   Total Score 18

## 2021-12-02 ENCOUNTER — LAB (OUTPATIENT)
Dept: LAB | Facility: HOSPITAL | Age: 41
End: 2021-12-02

## 2021-12-02 DIAGNOSIS — E53.8 B12 DEFICIENCY: ICD-10-CM

## 2021-12-02 DIAGNOSIS — Z86.19 HISTORY OF LYME DISEASE: ICD-10-CM

## 2021-12-02 DIAGNOSIS — R53.83 FATIGUE, UNSPECIFIED TYPE: ICD-10-CM

## 2021-12-02 DIAGNOSIS — E78.2 MIXED HYPERLIPIDEMIA: ICD-10-CM

## 2021-12-02 LAB
25(OH)D3 SERPL-MCNC: 21.2 NG/ML
ALBUMIN SERPL-MCNC: 4.6 G/DL (ref 3.5–5.2)
ALBUMIN/GLOB SERPL: 1.6 G/DL
ALP SERPL-CCNC: 35 U/L (ref 39–117)
ALT SERPL W P-5'-P-CCNC: 171 U/L (ref 1–41)
ANION GAP SERPL CALCULATED.3IONS-SCNC: 14.4 MMOL/L (ref 5–15)
AST SERPL-CCNC: 101 U/L (ref 1–40)
BASOPHILS # BLD AUTO: 0.02 10*3/MM3 (ref 0–0.2)
BASOPHILS NFR BLD AUTO: 0.3 % (ref 0–1.5)
BILIRUB SERPL-MCNC: 0.5 MG/DL (ref 0–1.2)
BUN SERPL-MCNC: 15 MG/DL (ref 6–20)
BUN/CREAT SERPL: 16.5 (ref 7–25)
CALCIUM SPEC-SCNC: 9.4 MG/DL (ref 8.6–10.5)
CHLORIDE SERPL-SCNC: 101 MMOL/L (ref 98–107)
CHOLEST SERPL-MCNC: 239 MG/DL (ref 0–200)
CO2 SERPL-SCNC: 26.6 MMOL/L (ref 22–29)
CREAT SERPL-MCNC: 0.91 MG/DL (ref 0.76–1.27)
DEPRECATED RDW RBC AUTO: 42.1 FL (ref 37–54)
EOSINOPHIL # BLD AUTO: 0.09 10*3/MM3 (ref 0–0.4)
EOSINOPHIL NFR BLD AUTO: 1.5 % (ref 0.3–6.2)
ERYTHROCYTE [DISTWIDTH] IN BLOOD BY AUTOMATED COUNT: 13.6 % (ref 12.3–15.4)
FOLATE SERPL-MCNC: 19.1 NG/ML (ref 4.78–24.2)
GFR SERPL CREATININE-BSD FRML MDRD: 92 ML/MIN/1.73
GLOBULIN UR ELPH-MCNC: 2.8 GM/DL
GLUCOSE SERPL-MCNC: 96 MG/DL (ref 65–99)
HCT VFR BLD AUTO: 42.5 % (ref 37.5–51)
HDLC SERPL-MCNC: 31 MG/DL (ref 40–60)
HGB BLD-MCNC: 14.2 G/DL (ref 13–17.7)
IMM GRANULOCYTES # BLD AUTO: 0.03 10*3/MM3 (ref 0–0.05)
IMM GRANULOCYTES NFR BLD AUTO: 0.5 % (ref 0–0.5)
LDLC SERPL CALC-MCNC: 129 MG/DL (ref 0–100)
LDLC/HDLC SERPL: 3.9 {RATIO}
LYMPHOCYTES # BLD AUTO: 2.68 10*3/MM3 (ref 0.7–3.1)
LYMPHOCYTES NFR BLD AUTO: 44.3 % (ref 19.6–45.3)
MCH RBC QN AUTO: 28.5 PG (ref 26.6–33)
MCHC RBC AUTO-ENTMCNC: 33.4 G/DL (ref 31.5–35.7)
MCV RBC AUTO: 85.2 FL (ref 79–97)
MONOCYTES # BLD AUTO: 0.38 10*3/MM3 (ref 0.1–0.9)
MONOCYTES NFR BLD AUTO: 6.3 % (ref 5–12)
NEUTROPHILS NFR BLD AUTO: 2.85 10*3/MM3 (ref 1.7–7)
NEUTROPHILS NFR BLD AUTO: 47.1 % (ref 42.7–76)
NRBC BLD AUTO-RTO: 0 /100 WBC (ref 0–0.2)
PLATELET # BLD AUTO: 175 10*3/MM3 (ref 140–450)
PMV BLD AUTO: 11 FL (ref 6–12)
POTASSIUM SERPL-SCNC: 4.5 MMOL/L (ref 3.5–5.2)
PROT SERPL-MCNC: 7.4 G/DL (ref 6–8.5)
RBC # BLD AUTO: 4.99 10*6/MM3 (ref 4.14–5.8)
SODIUM SERPL-SCNC: 142 MMOL/L (ref 136–145)
TRIGL SERPL-MCNC: 435 MG/DL (ref 0–150)
TSH SERPL DL<=0.05 MIU/L-ACNC: 1.97 UIU/ML (ref 0.27–4.2)
VIT B12 BLD-MCNC: 1041 PG/ML (ref 211–946)
VLDLC SERPL-MCNC: 79 MG/DL (ref 5–40)
WBC NRBC COR # BLD: 6.05 10*3/MM3 (ref 3.4–10.8)

## 2021-12-02 PROCEDURE — 82746 ASSAY OF FOLIC ACID SERUM: CPT

## 2021-12-02 PROCEDURE — 86617 LYME DISEASE ANTIBODY: CPT

## 2021-12-02 PROCEDURE — 84443 ASSAY THYROID STIM HORMONE: CPT

## 2021-12-02 PROCEDURE — 80061 LIPID PANEL: CPT

## 2021-12-02 PROCEDURE — 85025 COMPLETE CBC W/AUTO DIFF WBC: CPT

## 2021-12-02 PROCEDURE — 86618 LYME DISEASE ANTIBODY: CPT

## 2021-12-02 PROCEDURE — 36415 COLL VENOUS BLD VENIPUNCTURE: CPT

## 2021-12-02 PROCEDURE — 80053 COMPREHEN METABOLIC PANEL: CPT

## 2021-12-02 PROCEDURE — 82306 VITAMIN D 25 HYDROXY: CPT

## 2021-12-02 PROCEDURE — 82607 VITAMIN B-12: CPT

## 2021-12-03 LAB
B BURGDOR IGG SER QL: NEGATIVE
B BURGDOR IGM SER QL: POSITIVE

## 2022-01-13 ENCOUNTER — PATIENT MESSAGE (OUTPATIENT)
Dept: GASTROENTEROLOGY | Facility: CLINIC | Age: 42
End: 2022-01-13

## 2022-01-13 ENCOUNTER — TELEPHONE (OUTPATIENT)
Dept: CARDIOLOGY | Facility: CLINIC | Age: 42
End: 2022-01-13

## 2022-01-17 ENCOUNTER — TELEPHONE (OUTPATIENT)
Dept: CARDIOLOGY | Facility: CLINIC | Age: 42
End: 2022-01-17

## 2022-01-17 NOTE — TELEPHONE ENCOUNTER
----- Message from Timi Hernandez sent at 1/14/2022  3:40 PM EST -----  Regarding: Lexapro  I’m currently on 30 mg of Cymbalta daily my therapist wants to know if she can take it up to 60 mg of Cymbalta daily.     Dontrell Hernandez

## 2022-01-17 NOTE — TELEPHONE ENCOUNTER
From: Timi Hernandez  To: Alla Kaiser, APRN  Sent: 1/13/2022 1:32 PM EST  Subject: Lexapro    My therapist wants to put me on Lexapro. But it says not to take if you have liver issues. What are your thoughts Ms. Kaiser? You think it would bother my NAFLD? Currently on cymbolta but it’s not working.     Dontrell Hernandez  [phone]

## 2022-01-18 ENCOUNTER — LAB (OUTPATIENT)
Dept: LAB | Facility: HOSPITAL | Age: 42
End: 2022-01-18

## 2022-01-18 DIAGNOSIS — R79.89 ELEVATED LFTS: ICD-10-CM

## 2022-01-18 DIAGNOSIS — R79.89 ELEVATED LFTS: Primary | ICD-10-CM

## 2022-01-18 LAB
ALBUMIN SERPL-MCNC: 4.7 G/DL (ref 3.5–5.2)
ALP SERPL-CCNC: 39 U/L (ref 39–117)
ALT SERPL W P-5'-P-CCNC: 141 U/L (ref 1–41)
AST SERPL-CCNC: 77 U/L (ref 1–40)
BILIRUB CONJ SERPL-MCNC: <0.2 MG/DL (ref 0–0.3)
BILIRUB INDIRECT SERPL-MCNC: ABNORMAL MG/DL
BILIRUB SERPL-MCNC: 0.5 MG/DL (ref 0–1.2)
INR PPP: 1 (ref 2–3)
PROT SERPL-MCNC: 7.3 G/DL (ref 6–8.5)
PROTHROMBIN TIME: 10.5 SECONDS (ref 9.4–12)

## 2022-01-18 PROCEDURE — 36415 COLL VENOUS BLD VENIPUNCTURE: CPT

## 2022-01-18 PROCEDURE — 85610 PROTHROMBIN TIME: CPT

## 2022-01-18 PROCEDURE — 80076 HEPATIC FUNCTION PANEL: CPT

## 2022-01-19 ENCOUNTER — DOCUMENTATION (OUTPATIENT)
Dept: CARDIOLOGY | Facility: CLINIC | Age: 42
End: 2022-01-19

## 2022-01-19 NOTE — PROGRESS NOTES
----- Message from Timi Hernandez sent at 1/14/2022  3:40 PM EST -----  Regarding: Lexapro  I’m currently on 30 mg of Cymbalta daily my therapist wants to know if she can take it up to 60 mg of Cymbalta daily.      Dontrell Hernandez       Called and spoke with patient as Cymbalta was not on last med at last visit. It was started recently and he is actually stopping it now because he does not like the way it makes him feel.     Discussed with pharmacy who recommend sertraline or paroxetine as lowest risk of QT prolongation. Also reeducated patient re: dangers of med interactions with Long QT syndrome. Gave him info re: [facility].   Patient verbalizes understanding. All questions answered.     Electronically signed by NICOLE Heller, 01/19/22, 5:21 PM EST.

## 2022-01-28 DIAGNOSIS — I45.81 LONG Q-T SYNDROME: Primary | ICD-10-CM

## 2022-01-28 DIAGNOSIS — I49.8 BRUGADA SYNDROME: ICD-10-CM

## 2022-01-28 DIAGNOSIS — Z82.41 FAMILY HISTORY OF SUDDEN CARDIAC DEATH (SCD): ICD-10-CM

## 2022-01-31 ENCOUNTER — LAB (OUTPATIENT)
Dept: LAB | Facility: HOSPITAL | Age: 42
End: 2022-01-31

## 2022-01-31 ENCOUNTER — PREP FOR SURGERY (OUTPATIENT)
Dept: OTHER | Facility: HOSPITAL | Age: 42
End: 2022-01-31

## 2022-01-31 DIAGNOSIS — I45.81 LONG Q-T SYNDROME: Primary | ICD-10-CM

## 2022-01-31 DIAGNOSIS — E78.5 DYSLIPIDEMIA: ICD-10-CM

## 2022-01-31 DIAGNOSIS — R79.89 ELEVATED LFTS: ICD-10-CM

## 2022-01-31 LAB
ALBUMIN SERPL-MCNC: 4.7 G/DL (ref 3.5–5.2)
ALP SERPL-CCNC: 40 U/L (ref 39–117)
ALT SERPL W P-5'-P-CCNC: 194 U/L (ref 1–41)
AST SERPL-CCNC: 101 U/L (ref 1–40)
BILIRUB CONJ SERPL-MCNC: <0.2 MG/DL (ref 0–0.3)
BILIRUB INDIRECT SERPL-MCNC: ABNORMAL MG/DL
BILIRUB SERPL-MCNC: 0.5 MG/DL (ref 0–1.2)
CHOLEST SERPL-MCNC: 241 MG/DL (ref 0–200)
HDLC SERPL-MCNC: 27 MG/DL (ref 40–60)
LDLC SERPL CALC-MCNC: 117 MG/DL (ref 0–100)
LDLC/HDLC SERPL: 3.87 {RATIO}
PROT SERPL-MCNC: 6.9 G/DL (ref 6–8.5)
TRIGL SERPL-MCNC: 547 MG/DL (ref 0–150)
VLDLC SERPL-MCNC: 97 MG/DL (ref 5–40)

## 2022-01-31 PROCEDURE — 80061 LIPID PANEL: CPT

## 2022-01-31 PROCEDURE — 80076 HEPATIC FUNCTION PANEL: CPT

## 2022-01-31 PROCEDURE — 36415 COLL VENOUS BLD VENIPUNCTURE: CPT

## 2022-01-31 RX ORDER — SODIUM CHLORIDE 0.9 % (FLUSH) 0.9 %
10 SYRINGE (ML) INJECTION AS NEEDED
Status: CANCELLED | OUTPATIENT
Start: 2022-01-31

## 2022-01-31 RX ORDER — NITROGLYCERIN 0.4 MG/1
0.4 TABLET SUBLINGUAL
Status: CANCELLED | OUTPATIENT
Start: 2022-01-31

## 2022-01-31 RX ORDER — SODIUM CHLORIDE 0.9 % (FLUSH) 0.9 %
3 SYRINGE (ML) INJECTION EVERY 12 HOURS SCHEDULED
Status: CANCELLED | OUTPATIENT
Start: 2022-01-31

## 2022-01-31 RX ORDER — ACETAMINOPHEN 325 MG/1
650 TABLET ORAL EVERY 4 HOURS PRN
Status: CANCELLED | OUTPATIENT
Start: 2022-01-31

## 2022-02-01 DIAGNOSIS — R79.89 ELEVATED LFTS: Primary | ICD-10-CM

## 2022-02-01 NOTE — PROGRESS NOTES
LFT's have increased. Strict low carb diet /exercise, 2-4 c. Coffee/d recommended, has pt started any new medications?  Recheck LFTs 1 week, will create order

## 2022-02-02 ENCOUNTER — TELEPHONE (OUTPATIENT)
Dept: CARDIOLOGY | Facility: CLINIC | Age: 42
End: 2022-02-02

## 2022-02-02 DIAGNOSIS — E78.5 DYSLIPIDEMIA: Primary | ICD-10-CM

## 2022-02-02 RX ORDER — EZETIMIBE 10 MG/1
10 TABLET ORAL DAILY
Qty: 90 TABLET | Refills: 3 | Status: ON HOLD | OUTPATIENT
Start: 2022-02-02 | End: 2022-02-03

## 2022-02-03 ENCOUNTER — HOSPITAL ENCOUNTER (OUTPATIENT)
Facility: HOSPITAL | Age: 42
Discharge: HOME | End: 2022-02-03
Attending: INTERNAL MEDICINE | Admitting: INTERNAL MEDICINE

## 2022-02-03 ENCOUNTER — TELEPHONE (OUTPATIENT)
Dept: CARDIOLOGY | Facility: CLINIC | Age: 42
End: 2022-02-03

## 2022-02-03 VITALS
WEIGHT: 244 LBS | HEART RATE: 81 BPM | BODY MASS INDEX: 33.05 KG/M2 | RESPIRATION RATE: 20 BRPM | DIASTOLIC BLOOD PRESSURE: 106 MMHG | SYSTOLIC BLOOD PRESSURE: 152 MMHG | TEMPERATURE: 97 F | OXYGEN SATURATION: 95 % | HEIGHT: 72 IN

## 2022-02-03 DIAGNOSIS — I49.8 BRUGADA SYNDROME: ICD-10-CM

## 2022-02-03 DIAGNOSIS — Z82.41 FAMILY HISTORY OF SUDDEN CARDIAC DEATH (SCD): ICD-10-CM

## 2022-02-03 DIAGNOSIS — I45.81 LONG Q-T SYNDROME: ICD-10-CM

## 2022-02-03 LAB
ANION GAP SERPL CALCULATED.3IONS-SCNC: 12 MMOL/L (ref 5–15)
BUN SERPL-MCNC: 13 MG/DL (ref 6–20)
BUN/CREAT SERPL: 14.1 (ref 7–25)
CALCIUM SPEC-SCNC: 9.7 MG/DL (ref 8.6–10.5)
CHLORIDE SERPL-SCNC: 101 MMOL/L (ref 98–107)
CO2 SERPL-SCNC: 25 MMOL/L (ref 22–29)
CREAT SERPL-MCNC: 0.92 MG/DL (ref 0.76–1.27)
DEPRECATED RDW RBC AUTO: 39 FL (ref 37–54)
ERYTHROCYTE [DISTWIDTH] IN BLOOD BY AUTOMATED COUNT: 13.2 % (ref 12.3–15.4)
GFR SERPL CREATININE-BSD FRML MDRD: 91 ML/MIN/1.73
GLUCOSE SERPL-MCNC: 106 MG/DL (ref 65–99)
HBA1C MFR BLD: 5.7 % (ref 4.8–5.6)
HCT VFR BLD AUTO: 39.8 % (ref 37.5–51)
HGB BLD-MCNC: 14.1 G/DL (ref 13–17.7)
MCH RBC QN AUTO: 29.1 PG (ref 26.6–33)
MCHC RBC AUTO-ENTMCNC: 35.4 G/DL (ref 31.5–35.7)
MCV RBC AUTO: 82.1 FL (ref 79–97)
PLATELET # BLD AUTO: 187 10*3/MM3 (ref 140–450)
PMV BLD AUTO: 10.6 FL (ref 6–12)
POTASSIUM SERPL-SCNC: 4 MMOL/L (ref 3.5–5.2)
RBC # BLD AUTO: 4.85 10*6/MM3 (ref 4.14–5.8)
SODIUM SERPL-SCNC: 138 MMOL/L (ref 136–145)
WBC NRBC COR # BLD: 6.85 10*3/MM3 (ref 3.4–10.8)

## 2022-02-03 PROCEDURE — 25010000002 ONDANSETRON PER 1 MG: Performed by: INTERNAL MEDICINE

## 2022-02-03 PROCEDURE — 99153 MOD SED SAME PHYS/QHP EA: CPT | Performed by: INTERNAL MEDICINE

## 2022-02-03 PROCEDURE — 93620 COMP EP EVL R AT VEN PAC&REC: CPT | Performed by: INTERNAL MEDICINE

## 2022-02-03 PROCEDURE — 0 LIDOCAINE 1 % SOLUTION: Performed by: INTERNAL MEDICINE

## 2022-02-03 PROCEDURE — 25010000002 PROCAINAMIDE PER 1 G: Performed by: INTERNAL MEDICINE

## 2022-02-03 PROCEDURE — 93623 PRGRMD STIMJ&PACG IV RX NFS: CPT | Performed by: INTERNAL MEDICINE

## 2022-02-03 PROCEDURE — 25010000002 MIDAZOLAM PER 1 MG: Performed by: INTERNAL MEDICINE

## 2022-02-03 PROCEDURE — 85027 COMPLETE CBC AUTOMATED: CPT | Performed by: NURSE PRACTITIONER

## 2022-02-03 PROCEDURE — 83036 HEMOGLOBIN GLYCOSYLATED A1C: CPT | Performed by: NURSE PRACTITIONER

## 2022-02-03 PROCEDURE — C1760 CLOSURE DEV, VASC: HCPCS | Performed by: INTERNAL MEDICINE

## 2022-02-03 PROCEDURE — 93005 ELECTROCARDIOGRAM TRACING: CPT | Performed by: INTERNAL MEDICINE

## 2022-02-03 PROCEDURE — 25010000002 FENTANYL CITRATE (PF) 50 MCG/ML SOLUTION: Performed by: INTERNAL MEDICINE

## 2022-02-03 PROCEDURE — C1894 INTRO/SHEATH, NON-LASER: HCPCS | Performed by: INTERNAL MEDICINE

## 2022-02-03 PROCEDURE — C1730 CATH, EP, 19 OR FEW ELECT: HCPCS | Performed by: INTERNAL MEDICINE

## 2022-02-03 PROCEDURE — 80048 BASIC METABOLIC PNL TOTAL CA: CPT | Performed by: NURSE PRACTITIONER

## 2022-02-03 PROCEDURE — 99152 MOD SED SAME PHYS/QHP 5/>YRS: CPT | Performed by: INTERNAL MEDICINE

## 2022-02-03 PROCEDURE — 93010 ELECTROCARDIOGRAM REPORT: CPT | Performed by: INTERNAL MEDICINE

## 2022-02-03 PROCEDURE — 25010000002 DIPHENHYDRAMINE PER 50 MG: Performed by: INTERNAL MEDICINE

## 2022-02-03 RX ORDER — ACETAMINOPHEN 650 MG/1
650 SUPPOSITORY RECTAL EVERY 4 HOURS PRN
Status: DISCONTINUED | OUTPATIENT
Start: 2022-02-03 | End: 2022-02-03 | Stop reason: HOSPADM

## 2022-02-03 RX ORDER — NADOLOL 20 MG/1
20 TABLET ORAL DAILY
Qty: 30 TABLET | Refills: 6 | Status: ON HOLD | OUTPATIENT
Start: 2022-02-03 | End: 2022-07-29

## 2022-02-03 RX ORDER — NITROGLYCERIN 0.4 MG/1
0.4 TABLET SUBLINGUAL
Status: DISCONTINUED | OUTPATIENT
Start: 2022-02-03 | End: 2022-02-03 | Stop reason: HOSPADM

## 2022-02-03 RX ORDER — CLONAZEPAM 1 MG/1
1 TABLET ORAL 2 TIMES DAILY PRN
Status: DISCONTINUED | OUTPATIENT
Start: 2022-02-03 | End: 2022-02-03 | Stop reason: HOSPADM

## 2022-02-03 RX ORDER — ASPIRIN 81 MG/1
81 TABLET ORAL DAILY
Status: DISCONTINUED | OUTPATIENT
Start: 2022-02-03 | End: 2022-02-03 | Stop reason: HOSPADM

## 2022-02-03 RX ORDER — ONDANSETRON 2 MG/ML
4 INJECTION INTRAMUSCULAR; INTRAVENOUS EVERY 6 HOURS PRN
Status: DISCONTINUED | OUTPATIENT
Start: 2022-02-03 | End: 2022-02-03 | Stop reason: HOSPADM

## 2022-02-03 RX ORDER — MIDAZOLAM HYDROCHLORIDE 1 MG/ML
INJECTION INTRAMUSCULAR; INTRAVENOUS AS NEEDED
Status: DISCONTINUED | OUTPATIENT
Start: 2022-02-03 | End: 2022-02-03 | Stop reason: HOSPADM

## 2022-02-03 RX ORDER — ONDANSETRON 2 MG/ML
INJECTION INTRAMUSCULAR; INTRAVENOUS AS NEEDED
Status: DISCONTINUED | OUTPATIENT
Start: 2022-02-03 | End: 2022-02-03 | Stop reason: HOSPADM

## 2022-02-03 RX ORDER — SODIUM CHLORIDE 0.9 % (FLUSH) 0.9 %
10 SYRINGE (ML) INJECTION AS NEEDED
Status: DISCONTINUED | OUTPATIENT
Start: 2022-02-03 | End: 2022-02-03 | Stop reason: HOSPADM

## 2022-02-03 RX ORDER — ACETAMINOPHEN 325 MG/1
650 TABLET ORAL EVERY 4 HOURS PRN
Status: DISCONTINUED | OUTPATIENT
Start: 2022-02-03 | End: 2022-02-03 | Stop reason: HOSPADM

## 2022-02-03 RX ORDER — SODIUM CHLORIDE 0.9 % (FLUSH) 0.9 %
3 SYRINGE (ML) INJECTION EVERY 12 HOURS SCHEDULED
Status: DISCONTINUED | OUTPATIENT
Start: 2022-02-03 | End: 2022-02-03 | Stop reason: HOSPADM

## 2022-02-03 RX ORDER — CETIRIZINE HYDROCHLORIDE 10 MG/1
5 TABLET ORAL DAILY
Status: DISCONTINUED | OUTPATIENT
Start: 2022-02-03 | End: 2022-02-03 | Stop reason: HOSPADM

## 2022-02-03 RX ORDER — SODIUM CHLORIDE 9 MG/ML
INJECTION, SOLUTION INTRAVENOUS CONTINUOUS PRN
Status: DISCONTINUED | OUTPATIENT
Start: 2022-02-03 | End: 2022-02-03 | Stop reason: HOSPADM

## 2022-02-03 RX ORDER — DIPHENHYDRAMINE HYDROCHLORIDE 50 MG/ML
INJECTION INTRAMUSCULAR; INTRAVENOUS AS NEEDED
Status: DISCONTINUED | OUTPATIENT
Start: 2022-02-03 | End: 2022-02-03 | Stop reason: HOSPADM

## 2022-02-03 RX ORDER — LIDOCAINE HYDROCHLORIDE 10 MG/ML
INJECTION, SOLUTION INFILTRATION; PERINEURAL AS NEEDED
Status: DISCONTINUED | OUTPATIENT
Start: 2022-02-03 | End: 2022-02-03 | Stop reason: HOSPADM

## 2022-02-03 RX ORDER — FENTANYL CITRATE 50 UG/ML
INJECTION, SOLUTION INTRAMUSCULAR; INTRAVENOUS AS NEEDED
Status: DISCONTINUED | OUTPATIENT
Start: 2022-02-03 | End: 2022-02-03 | Stop reason: HOSPADM

## 2022-02-03 RX ADMIN — PROCAINAMIDE HYDROCHLORIDE 1000 MG: 100 INJECTION INTRAMUSCULAR; INTRAVENOUS at 14:19

## 2022-02-03 NOTE — H&P
Cardiology H&P:      Timi Hernandez  1980    There is no work phone number on file.    02/03/22    DATE OF ADMISSION: 2/3/2022  [facility] Tushar Campbell MD  [address]  / [place] [address]  Referring Provider: Lang Sorensen MD     CC: syncope       Problem List:   1.  Long QT 3 syndrome/Possible Brugada Syndrome  a. Uncle with reported long QT syndrome. Grandfather with sudden cardiac death at age 36   b. Echo 3/29/2021 ejection fraction 55%, no significant valvular heart issues.  c. Stress Test 3/29/2021 good exercise tolerance, and exercise EKG negative for ischemia, atypical chest pain and abdominal pain with exercise, no arrhythmias seen.  Study is consistent with no reversible ischemia at rest or with exercise.  This indicates a low probability of coronary artery disease in this individual  d. 3/22/2021 48-hour Holter monitor normal sinus rhythm average heart rate of 86 bpm, 8 episodes of PACs, one 5 beat run of supraventricular tachycardia in the SVT consistent with ectopic atrial tachycardia  e. 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 Cardiac MRI 10/15/2021: Normal LV systolic function EF 68%, no CMR evidence for scar or fibrosis, normal size RV with mildly reduced function of 46%  g. Event Monitor 11/5-11/19/2022: NSR HR  bpm, average HR 72. No VT. Less than 1% PACs, no PVCs   2. RBBB  3. HTN  4. HLD   5. SANCHEZ   6. GERD        History of Present Illness:   Timi Hernandez is a 41-year-old male with history of positive genetic testing for pathogenic mutation in the SCN 5 a gene for long QT 3 versus Brugada Syndrome who presents today for EP study and procainamide challenge with possible subcutaneous ICD implant. He has a significant family history of long QT syndrome and also sudden cardiac death in several family members. He has several family members who have had ICDs. He has had a normal cardiac MRI and normal  cardiac echocardiograms however has an abnormal EKG. He has had episodes of syncope in the last 4 months with most recent being a couple of weeks ago. These episodes occur when he first stands up from a squatting position and seems to be related to his nadolol. He did wear a monitor in November 2021 which showed no VT or PVCs. He has been taking nadolol for several months and it was increased to twice daily in September 2021. He has had some bradycardia with this and increase of his dizziness/syncopal spells after that. He also reports instances when he feels that he is very hot and anxious. These episodes have been occurring since he was in his 20s. He denies any recent chest pain dyspnea on exertion hospitalizations ER visits. He has been off his nadolol for 5 days. No recent illnesses, fevers, chills, skin infections or open wounds. His blood pressure generally well controlled but has been higher since he has been off his nadolol.       Allergies   Allergen Reactions   • Penicillins Anaphylaxis   • Sulfamethoxazole-Trimethoprim Anaphylaxis and Rash     Other reaction(s): sore throat and ulcers   • Colchicine Rash     PT stated body was burning       Prior to Admission Medications     Prescriptions Last Dose Informant Patient Reported? Taking?    aspirin 81 MG EC tablet   Yes No    Take 81 mg by mouth Daily.    cetirizine (ZyrTEC Allergy) 10 MG tablet   Yes No    Zyrtec 10 mg oral tablet take 1 tablet (10 mg) by oral route once daily   Active    clonazePAM (KlonoPIN) 1 MG tablet  Self Yes No    Take 1 mg by mouth As Needed.    cyanocobalamin injection 1,000 mcg   No No    Evolocumab (REPATHA) solution auto-injector SureClick injection   No No    Inject 1 mL under the skin into the appropriate area as directed Every 14 (Fourteen) Days.    ezetimibe (ZETIA) 10 MG tablet   No No    Take 1 tablet by mouth Daily.    hyoscyamine (LEVSIN) 0.125 MG SL tablet   Yes No    Take 1 tablet by mouth Every 4 (Four) Hours As  Needed.    nadolol (Corgard) 20 MG tablet   No No    Take 1 tablet by mouth Every 12 (Twelve) Hours for 30 days. Take at bed time            Current Facility-Administered Medications:   •  acetaminophen (TYLENOL) tablet 650 mg, 650 mg, Oral, Q4H PRN, Vannesa Sharma APRN  •  nitroglycerin (NITROSTAT) SL tablet 0.4 mg, 0.4 mg, Sublingual, Q5 Min PRN, Vannesa Sharma APRN  •  sodium chloride 0.9 % flush 10 mL, 10 mL, Intravenous, PRN, Vannesa Sharma APRN  •  sodium chloride 0.9 % flush 3 mL, 3 mL, Intravenous, Q12H, Vannesa Sharma APRN  •  vancomycin 1500 mg/500 mL 0.9% NS IVPB ([facility]), 1,500 mg, Intravenous, On Call, Vannesa Sharma APRN    Social History     Socioeconomic History   • Marital status:    Tobacco Use   • Smoking status: Never Smoker   • Smokeless tobacco: Never Used   • Tobacco comment: second hand smoke exposure-never   Vaping Use   • Vaping Use: Never used   Substance and Sexual Activity   • Alcohol use: Never   • Drug use: Never   • Sexual activity: Yes     Partners: Female     Birth control/protection: Surgical     Comment: Vasectomy       Family History   Problem Relation Age of Onset   • Heart failure Mother         Heart attack in her early 50s   • Hyperlipidemia Mother    • Heart attack Mother    • Hypertension Mother    • Colon cancer Mother 53   • Stroke Mother    • Heart disease Father    • Stroke Maternal Grandfather    • Heart disease Maternal Grandfather    • Heart attack Other    • Heart disease Other    • Heart failure Other    • Hypertension Other    • Hyperlipidemia Other    • Heart disease Sister    • Lung cancer Maternal Uncle         50s   • Heart disease Maternal Uncle        REVIEW OF SYSTEMS:   CONSTITUTIONAL:         No weight loss, fever, chills, weakness + fatigue.   HEENT:                            No visual loss, blurred vision, double vision, yellow sclerae.                                             No hearing loss, congestion, sore throat.   SKIN:                "                 No rashes, urticaria, ulcers, sores.     RESPIRATORY:               No shortness of breath, hemoptysis, cough, sputum.   GI:                                     No anorexia, nausea, vomiting, diarrhea. No abdominal pain, melena.   :                                   No burning on urination, hematuria or increased frequency.  ENDOCRINE:                   No diaphoresis, cold or heat intolerance. No polyuria or polydipsia.   NEURO:                            No headache, + dizziness, + syncope, - paralysis, ataxia, or parasthesias.                                            No change in bowel or bladder control. No history of CVA/TIA  MUSCULOSKELETAL:    No muscle, back pain, joint pain or stiffness.   HEMATOLOGY:               No anemia, bleeding, bruising. No history of DVT/PE.  PSYCH:                            No history of depression, anxiety    Vitals:    02/03/22 1014 02/03/22 1016   BP: (!) 146/105 (!) 161/105   BP Location: Right arm Left arm   Patient Position: Sitting Sitting   Pulse: 96 82   Resp: 16    Temp: 97 °F (36.1 °C)    TempSrc: Temporal    SpO2: 99%    Weight: 111 kg (244 lb)    Height: 182.9 cm (72\")          Vital Sign Min/Max for last 24 hours  Temp  Min: 97 °F (36.1 °C)  Max: 97 °F (36.1 °C)   BP  Min: 146/105  Max: 161/105   Pulse  Min: 82  Max: 96   Resp  Min: 16  Max: 16   SpO2  Min: 99 %  Max: 99 %   No data recorded    No intake or output data in the 24 hours ending 02/03/22 1058          Physical Exam:  GEN: Well nourished, Well- developed  No acute distress  HEENT: Normocephalic, Atraumatic, PERRLA, moist mucous membranes  NECK: supple, NO JVD, no thyromegaly, no lymphadenopathy  CARDIAC: S1S2  RRR no murmur, gallop, rub  LUNGS: Clear to ausculation, normal respiratory effort  ABDOMEN: Soft, nontender, normal bowel sounds  EXTREMITIES:No gross deformities,  No clubbing, cyanosis, or edema  SKIN: Warm, dry  NEURO: No focal deficits  PSYCHIATRIC: Normal affect and mood  "     I personally viewed and interpreted the patient's EKG/Telemetry/lab data    Data:   Results from last 7 days   Lab Units 02/03/22  1015   WBC 10*3/mm3 6.85   HEMOGLOBIN g/dL 14.1   HEMATOCRIT % 39.8   PLATELETS 10*3/mm3 187     Results from last 7 days   Lab Units 02/03/22  1015   SODIUM mmol/L 138   POTASSIUM mmol/L 4.0   CHLORIDE mmol/L 101   CO2 mmol/L 25.0   BUN mg/dL 13   CREATININE mg/dL 0.92   GLUCOSE mg/dL 106*                          Results from last 7 days   Lab Units 01/31/22  1220   CHOLESTEROL mg/dL 241*   TRIGLYCERIDES mg/dL 547*   HDL CHOL mg/dL 27*   LDL CHOL mg/dL 117*         No intake or output data in the 24 hours ending 02/03/22 1058      Telemetry: Normal Sinus Rhythm          Assessment and Plan:   1. 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  - patient with recent syncopal episodes and palpitations with uneventful ziopatch  -Today he will undergo EP study and procainamide challenge test for risk stratification of malignant ventricular arrhythmias with +/- ICD implant. The risks, benefits, and alternatives of the procedure have been reviewed and the patient wishes to proceed.       Electronically signed by MELODY New, 02/03/22, 10:07 AM EST.    I have read the above note and agree

## 2022-02-03 NOTE — TELEPHONE ENCOUNTER
Spoke with pt and he was not fasting for labs. He instructed the lab not to drawn his lipid panel but it was done anyways. A new order was placed yesterday for pt to have lipid rechecked in 4 weeks.     Pt did not want to make any changes until he is able to redo his labs fasting.

## 2022-02-03 NOTE — TELEPHONE ENCOUNTER
----- Message from NICOLE Strickland sent at 2/2/2022 10:46 AM EST -----  Notify pt liver enzymes elevated as well as lipid panel.  Stop trilipix and vascepa  Start zetia 10 mg daily and repatha 140 mg every 14 days  Repeat lipid and hepatic function panel as ordered by PCP

## 2022-02-04 LAB
QT INTERVAL: 474 MS
QT INTERVAL: 482 MS
QT INTERVAL: 492 MS
QT INTERVAL: 498 MS
QT INTERVAL: 504 MS
QTC INTERVAL: 560 MS
QTC INTERVAL: 586 MS
QTC INTERVAL: 596 MS
QTC INTERVAL: 599 MS
QTC INTERVAL: 602 MS
QTC INTERVAL: 613 MS
QTC INTERVAL: 624 MS

## 2022-02-07 ENCOUNTER — TELEPHONE (OUTPATIENT)
Dept: CARDIOLOGY | Facility: CLINIC | Age: 42
End: 2022-02-07

## 2022-02-07 ENCOUNTER — CALL CENTER PROGRAMS (OUTPATIENT)
Dept: CALL CENTER | Facility: HOSPITAL | Age: 42
End: 2022-02-07

## 2022-02-07 ENCOUNTER — LAB (OUTPATIENT)
Dept: LAB | Facility: HOSPITAL | Age: 42
End: 2022-02-07

## 2022-02-07 DIAGNOSIS — R79.89 ELEVATED LFTS: ICD-10-CM

## 2022-02-07 LAB
ALBUMIN SERPL-MCNC: 5 G/DL (ref 3.5–5.2)
ALP SERPL-CCNC: 38 U/L (ref 39–117)
ALT SERPL W P-5'-P-CCNC: 186 U/L (ref 1–41)
AST SERPL-CCNC: 111 U/L (ref 1–40)
BILIRUB CONJ SERPL-MCNC: 0.2 MG/DL (ref 0–0.3)
BILIRUB INDIRECT SERPL-MCNC: 0.4 MG/DL
BILIRUB SERPL-MCNC: 0.6 MG/DL (ref 0–1.2)
INR PPP: 1.05 (ref 2–3)
PROT SERPL-MCNC: 7.7 G/DL (ref 6–8.5)
PROTHROMBIN TIME: 10.9 SECONDS (ref 9.4–12)

## 2022-02-07 PROCEDURE — 80076 HEPATIC FUNCTION PANEL: CPT

## 2022-02-07 PROCEDURE — 85610 PROTHROMBIN TIME: CPT

## 2022-02-07 PROCEDURE — 36415 COLL VENOUS BLD VENIPUNCTURE: CPT

## 2022-02-07 NOTE — TELEPHONE ENCOUNTER
----- Message from Timi Hernandez sent at 2/7/2022  9:44 AM EST -----  Regarding: Procedure  Is there any way you can call me and tell me what happen Thursday? My wife had stepped out and the nurse filled me in with everything but I was all looped out and I don’t remember much of anything. Did you not find Brugada? do I have prolong QT? where do we go from here? why didn’t I get the defibrillator? Will I ever need a defibrillator? While he was in there in their did he find any decay or clogged arteries?         Dontrell Hernandez  [phone]                    *I faxed him a work excuse stating that he could wear loose clothing until he recovers from the procedure.*

## 2022-02-07 NOTE — OUTREACH NOTE
PCI/Device Survey      Responses   Facility patient discharged from? [facility]   Procedure date 02/03/22   Procedure (if device, specify in description) PCI  [EPS + Procainimide Challenge +/- SICD ]   PCI site Right,  Groin   Performing MD Other (annotate)  [Dr Sorensen]   Attempt successful? Yes   Call start time 1052   Call end time 1058   Has the patient had any of the following symptoms since discharge? Dizziness or lightheadedness  [Patient reports that his dizziness and lightheadedness he has had  is same as prior to procedure, no worsening. ]   Is the patient taking prescribed medications: ASA   Nursing intervention Reminded to continue to take prescribed medications   Does the patient have any of the following symptoms related to the cath/surgical site? --  [Reports dressing removed from groin,  denies any issues at site, mildly sore. ]   Does the patient have an appointment scheduled with the cardiologist? Yes   Appointment comments 5/10/22 Dr Sorensen   If the patient is a current smoker, are they able to teach back resources for cessation? Not a smoker   Did the patient feel prepared to go home on the same day as the procedure? Yes   Is the patient satisfied with the same day discharge process? Yes   PCI/Device call completed Yes   Wrap up additional comments Patient has questions regarding procedure details, advised to contact cardiology office.           Kelli Wick RN

## 2022-02-07 NOTE — TELEPHONE ENCOUNTER
I called and spoke with the patient. He would like you to call him to discuss the procedure and his plan of care at this point. You can reach him at [phone].

## 2022-02-09 ENCOUNTER — TELEPHONE (OUTPATIENT)
Dept: CARDIOLOGY | Facility: CLINIC | Age: 42
End: 2022-02-09

## 2022-02-09 NOTE — TELEPHONE ENCOUNTER
Called patient and explained EP study and Procainamide challenge were negative and he did not meet criteria for ICD. He expresses verbal understanding and will follow up in May 2022. He will continue Nadolol. In regards to his groin site, no hematoma or hard knot noted. Mild bruising only. Mildly sore. He will monitor and let us know of any changes.     Electronically signed by MELODY New, 02/09/22, 5:05 PM EST.      ----- Message from Sveta Muhammad RN sent at 2/9/2022  4:02 PM EST -----  Regarding: FW: Results from procedure Thursday Feb 3rd    ----- Message -----  From: Timi Hernandez  Sent: 2/9/2022   3:55 PM EST  To: [facility]  Subject: Results from procedure Thursday Feb 3rd          Is there any way the nurse can call me today and tell me what happened last Thursday? My wife had stepped out and the nurse filled me in with everything but I was all looped out and I don’t remember much of anything. Did he not find Brugada? do I have prolong QT? where do we go from here? why didn’t I get the defibrillator? Will I ever need a defibrillator? While he was in there in their did he find any decay or clogged arteries?      Also I am still sore in my lower belly area and bruising just slight showed up. Is this normal? Bruising is not bad, mainly the just sore is annoying.      Dontrell Hernandez  [phone]

## 2022-02-18 ENCOUNTER — CLINICAL SUPPORT (OUTPATIENT)
Dept: FAMILY MEDICINE CLINIC | Facility: CLINIC | Age: 42
End: 2022-02-18

## 2022-02-18 DIAGNOSIS — E53.8 B12 DEFICIENCY: ICD-10-CM

## 2022-02-18 PROCEDURE — 96372 THER/PROPH/DIAG INJ SC/IM: CPT | Performed by: FAMILY MEDICINE

## 2022-02-18 RX ADMIN — CYANOCOBALAMIN 1000 MCG: 1000 INJECTION, SOLUTION INTRAMUSCULAR; SUBCUTANEOUS at 13:12

## 2022-02-22 ENCOUNTER — TRANSCRIBE ORDERS (OUTPATIENT)
Dept: ADMINISTRATIVE | Facility: HOSPITAL | Age: 42
End: 2022-02-22

## 2022-02-22 DIAGNOSIS — R42 DIZZINESS: Primary | ICD-10-CM

## 2022-02-25 ENCOUNTER — APPOINTMENT (OUTPATIENT)
Dept: LAB | Facility: HOSPITAL | Age: 42
End: 2022-02-25

## 2022-03-01 ENCOUNTER — TRANSCRIBE ORDERS (OUTPATIENT)
Dept: ADMINISTRATIVE | Facility: HOSPITAL | Age: 42
End: 2022-03-01

## 2022-03-01 ENCOUNTER — LAB (OUTPATIENT)
Dept: LAB | Facility: HOSPITAL | Age: 42
End: 2022-03-01

## 2022-03-01 DIAGNOSIS — G40.919 INTRACTABLE EPILEPSY WITHOUT STATUS EPILEPTICUS, UNSPECIFIED EPILEPSY TYPE: Primary | ICD-10-CM

## 2022-03-01 DIAGNOSIS — G62.9 POLYNEUROPATHY: ICD-10-CM

## 2022-03-01 DIAGNOSIS — G40.909 NONINTRACTABLE EPILEPSY WITHOUT STATUS EPILEPTICUS, UNSPECIFIED EPILEPSY TYPE: ICD-10-CM

## 2022-03-01 DIAGNOSIS — R79.89 ELEVATED LFTS: ICD-10-CM

## 2022-03-01 DIAGNOSIS — I95.1 ORTHOSTATIC HYPOTENSION: ICD-10-CM

## 2022-03-01 DIAGNOSIS — E78.5 DYSLIPIDEMIA: ICD-10-CM

## 2022-03-01 DIAGNOSIS — G40.909 NONINTRACTABLE EPILEPSY WITHOUT STATUS EPILEPTICUS, UNSPECIFIED EPILEPSY TYPE: Primary | ICD-10-CM

## 2022-03-01 LAB
25(OH)D3 SERPL-MCNC: 20.4 NG/ML (ref 30–100)
ALBUMIN SERPL-MCNC: 4.9 G/DL (ref 3.5–5.2)
ALBUMIN/GLOB SERPL: 1.8 G/DL
ALP SERPL-CCNC: 34 U/L (ref 39–117)
ALT SERPL W P-5'-P-CCNC: 159 U/L (ref 1–41)
ANION GAP SERPL CALCULATED.3IONS-SCNC: 12 MMOL/L (ref 5–15)
AST SERPL-CCNC: 77 U/L (ref 1–40)
BASOPHILS # BLD AUTO: 0.03 10*3/MM3 (ref 0–0.2)
BASOPHILS NFR BLD AUTO: 0.5 % (ref 0–1.5)
BILIRUB CONJ SERPL-MCNC: <0.2 MG/DL (ref 0–0.3)
BILIRUB SERPL-MCNC: 0.6 MG/DL (ref 0–1.2)
BUN SERPL-MCNC: 15 MG/DL (ref 6–20)
BUN/CREAT SERPL: 14.7 (ref 7–25)
CALCIUM SPEC-SCNC: 9.4 MG/DL (ref 8.6–10.5)
CEA SERPL-MCNC: 0.88 NG/ML
CHLORIDE SERPL-SCNC: 100 MMOL/L (ref 98–107)
CHOLEST SERPL-MCNC: 251 MG/DL (ref 0–200)
CO2 SERPL-SCNC: 24 MMOL/L (ref 22–29)
CREAT SERPL-MCNC: 1.02 MG/DL (ref 0.76–1.27)
DEPRECATED RDW RBC AUTO: 44 FL (ref 37–54)
EGFRCR SERPLBLD CKD-EPI 2021: 94.7 ML/MIN/1.73
EOSINOPHIL # BLD AUTO: 0.12 10*3/MM3 (ref 0–0.4)
EOSINOPHIL NFR BLD AUTO: 1.9 % (ref 0.3–6.2)
ERYTHROCYTE [DISTWIDTH] IN BLOOD BY AUTOMATED COUNT: 14.1 % (ref 12.3–15.4)
FERRITIN SERPL-MCNC: 1028 NG/ML (ref 30–400)
FOLATE SERPL-MCNC: >20 NG/ML (ref 4.78–24.2)
GLOBULIN UR ELPH-MCNC: 2.8 GM/DL
GLUCOSE 1H P 100 G GLC PO SERPL-MCNC: 210 MG/DL (ref 74–180)
GLUCOSE 2H P 100 G GLC PO SERPL-MCNC: 168 MG/DL (ref 74–155)
GLUCOSE 3H P 100 G GLC PO SERPL-MCNC: 109 MG/DL (ref 74–140)
GLUCOSE 4H P CHAL SERPL-MCNC: 77 MG/DL (ref 74–106)
GLUCOSE 5H P CHAL SERPL-MCNC: 84 MG/DL (ref 74–106)
GLUCOSE BLDC GLUCOMTR-MCNC: 102 MG/DL (ref 70–99)
GLUCOSE P FAST SERPL-MCNC: 106 MG/DL (ref 74–106)
GLUCOSE SERPL-MCNC: 101 MG/DL (ref 65–99)
HBA1C MFR BLD: 5.7 % (ref 4.8–5.6)
HCT VFR BLD AUTO: 43.9 % (ref 37.5–51)
HDLC SERPL-MCNC: 32 MG/DL (ref 40–60)
HGB BLD-MCNC: 15 G/DL (ref 13–17.7)
IMM GRANULOCYTES # BLD AUTO: 0.03 10*3/MM3 (ref 0–0.05)
IMM GRANULOCYTES NFR BLD AUTO: 0.5 % (ref 0–0.5)
LDLC SERPL CALC-MCNC: 147 MG/DL (ref 0–100)
LDLC/HDLC SERPL: 4.41 {RATIO}
LYMPHOCYTES # BLD AUTO: 3.09 10*3/MM3 (ref 0.7–3.1)
LYMPHOCYTES NFR BLD AUTO: 49.3 % (ref 19.6–45.3)
MCH RBC QN AUTO: 29.3 PG (ref 26.6–33)
MCHC RBC AUTO-ENTMCNC: 34.2 G/DL (ref 31.5–35.7)
MCV RBC AUTO: 85.7 FL (ref 79–97)
MONOCYTES # BLD AUTO: 0.41 10*3/MM3 (ref 0.1–0.9)
MONOCYTES NFR BLD AUTO: 6.5 % (ref 5–12)
NEUTROPHILS NFR BLD AUTO: 2.59 10*3/MM3 (ref 1.7–7)
NEUTROPHILS NFR BLD AUTO: 41.3 % (ref 42.7–76)
NRBC BLD AUTO-RTO: 0 /100 WBC (ref 0–0.2)
PLATELET # BLD AUTO: 201 10*3/MM3 (ref 140–450)
PMV BLD AUTO: 11.2 FL (ref 6–12)
POTASSIUM SERPL-SCNC: 4.2 MMOL/L (ref 3.5–5.2)
PROT SERPL-MCNC: 7.7 G/DL (ref 6–8.5)
RBC # BLD AUTO: 5.12 10*6/MM3 (ref 4.14–5.8)
SODIUM SERPL-SCNC: 136 MMOL/L (ref 136–145)
T4 FREE SERPL-MCNC: 1.2 NG/DL (ref 0.93–1.7)
TRIGL SERPL-MCNC: 389 MG/DL (ref 0–150)
TSH SERPL DL<=0.05 MIU/L-ACNC: 1.64 UIU/ML (ref 0.27–4.2)
VIT B12 BLD-MCNC: 755 PG/ML (ref 211–946)
VLDLC SERPL-MCNC: 72 MG/DL (ref 5–40)
WBC NRBC COR # BLD: 6.27 10*3/MM3 (ref 3.4–10.8)

## 2022-03-01 PROCEDURE — 36415 COLL VENOUS BLD VENIPUNCTURE: CPT

## 2022-03-01 PROCEDURE — 82378 CARCINOEMBRYONIC ANTIGEN: CPT

## 2022-03-01 PROCEDURE — 80050 GENERAL HEALTH PANEL: CPT

## 2022-03-01 PROCEDURE — 86038 ANTINUCLEAR ANTIBODIES: CPT

## 2022-03-01 PROCEDURE — 82951 GLUCOSE TOLERANCE TEST (GTT): CPT

## 2022-03-01 PROCEDURE — 82248 BILIRUBIN DIRECT: CPT

## 2022-03-01 PROCEDURE — 80061 LIPID PANEL: CPT

## 2022-03-01 PROCEDURE — 82746 ASSAY OF FOLIC ACID SERUM: CPT

## 2022-03-01 PROCEDURE — 83036 HEMOGLOBIN GLYCOSYLATED A1C: CPT

## 2022-03-01 PROCEDURE — 82300 ASSAY OF CADMIUM: CPT

## 2022-03-01 PROCEDURE — 83655 ASSAY OF LEAD: CPT

## 2022-03-01 PROCEDURE — 82607 VITAMIN B-12: CPT

## 2022-03-01 PROCEDURE — 86618 LYME DISEASE ANTIBODY: CPT

## 2022-03-01 PROCEDURE — 83825 ASSAY OF MERCURY: CPT

## 2022-03-01 PROCEDURE — 82306 VITAMIN D 25 HYDROXY: CPT

## 2022-03-01 PROCEDURE — 84439 ASSAY OF FREE THYROXINE: CPT

## 2022-03-01 PROCEDURE — 82175 ASSAY OF ARSENIC: CPT

## 2022-03-01 PROCEDURE — 82952 GTT-ADDED SAMPLES: CPT

## 2022-03-01 PROCEDURE — 82728 ASSAY OF FERRITIN: CPT

## 2022-03-02 ENCOUNTER — TELEPHONE (OUTPATIENT)
Dept: CARDIOLOGY | Facility: CLINIC | Age: 42
End: 2022-03-02

## 2022-03-02 DIAGNOSIS — E78.5 DYSLIPIDEMIA: ICD-10-CM

## 2022-03-02 DIAGNOSIS — I10 ESSENTIAL HYPERTENSION: Primary | ICD-10-CM

## 2022-03-02 LAB
ANA TITR SER IF: NEGATIVE {TITER}
B BURGDOR IGG+IGM SER-ACNC: <0.91 ISR (ref 0–0.9)

## 2022-03-02 NOTE — TELEPHONE ENCOUNTER
Spoke to the pt about his labs. Pt states he is not interested in making changes to his medications. Pt has talked to gastro and they told him that would not change his medications unless his liver enzymes went above 200. Pt feels that he is much better than he was without medication and states he would never give himself a shot.     Pt asked to leave medications as is and to put an order in for lipid and hepatic the last week of May before he sees Dr. Sawant 6/1.

## 2022-03-02 NOTE — TELEPHONE ENCOUNTER
----- Message from NICOLE Strickland sent at 3/2/2022  8:34 AM EST -----  Notify pt labs still show: liver enzymes elevated as well as lipid panel.  Would recommend stopping trilipix and vascepa  Start zetia 10 mg daily and repatha 140 mg every 14 days  Recheck fasting lipid and hepatic function panel in 3 months

## 2022-03-03 ENCOUNTER — HOSPITAL ENCOUNTER (OUTPATIENT)
Dept: NEUROLOGY | Facility: HOSPITAL | Age: 42
Discharge: HOME | End: 2022-03-03
Admitting: PSYCHIATRY & NEUROLOGY

## 2022-03-03 DIAGNOSIS — R42 DIZZINESS: ICD-10-CM

## 2022-03-03 LAB
ARSENIC BLD-MCNC: <1 UG/L (ref 0–9)
CADMIUM BLD-MCNC: <0.5 UG/L (ref 0–1.2)
LEAD BLDV-MCNC: <1 UG/DL (ref 0–4)
MERCURY BLD-MCNC: <1 UG/L (ref 0–14.9)

## 2022-03-03 PROCEDURE — 95816 EEG AWAKE AND DROWSY: CPT

## 2022-03-07 ENCOUNTER — OFFICE VISIT (OUTPATIENT)
Dept: FAMILY MEDICINE CLINIC | Facility: CLINIC | Age: 42
End: 2022-03-07

## 2022-03-07 VITALS
DIASTOLIC BLOOD PRESSURE: 84 MMHG | BODY MASS INDEX: 32.93 KG/M2 | RESPIRATION RATE: 20 BRPM | HEIGHT: 72 IN | OXYGEN SATURATION: 99 % | HEART RATE: 69 BPM | TEMPERATURE: 97.5 F | SYSTOLIC BLOOD PRESSURE: 130 MMHG | WEIGHT: 243.1 LBS

## 2022-03-07 DIAGNOSIS — E53.8 B12 DEFICIENCY: ICD-10-CM

## 2022-03-07 DIAGNOSIS — R79.89 ELEVATED FERRITIN LEVEL: ICD-10-CM

## 2022-03-07 PROCEDURE — 99213 OFFICE O/P EST LOW 20 MIN: CPT | Performed by: FAMILY MEDICINE

## 2022-03-07 PROCEDURE — 96372 THER/PROPH/DIAG INJ SC/IM: CPT | Performed by: FAMILY MEDICINE

## 2022-03-07 RX ADMIN — CYANOCOBALAMIN 1000 MCG: 1000 INJECTION, SOLUTION INTRAMUSCULAR; SUBCUTANEOUS at 11:12

## 2022-03-14 ENCOUNTER — CONSULT (OUTPATIENT)
Dept: ONCOLOGY | Facility: HOSPITAL | Age: 42
End: 2022-03-14

## 2022-03-14 ENCOUNTER — LAB (OUTPATIENT)
Dept: ONCOLOGY | Facility: HOSPITAL | Age: 42
End: 2022-03-14

## 2022-03-14 VITALS
SYSTOLIC BLOOD PRESSURE: 138 MMHG | OXYGEN SATURATION: 97 % | BODY MASS INDEX: 32.65 KG/M2 | RESPIRATION RATE: 16 BRPM | DIASTOLIC BLOOD PRESSURE: 86 MMHG | TEMPERATURE: 97.8 F | HEART RATE: 56 BPM | WEIGHT: 240.74 LBS

## 2022-03-14 DIAGNOSIS — R79.89 ELEVATED FERRITIN LEVEL: ICD-10-CM

## 2022-03-14 PROBLEM — E79.0 HYPERURICEMIA: Status: ACTIVE | Noted: 2021-12-14

## 2022-03-14 LAB
HAV IGM SERPL QL IA: NORMAL
HBV CORE IGM SERPL QL IA: NORMAL
HBV SURFACE AG SERPL QL IA: NORMAL
HCV AB SER DONR QL: NORMAL
IRON 24H UR-MRATE: 103 MCG/DL (ref 59–158)
IRON SATN MFR SERPL: 24 % (ref 20–50)
TIBC SERPL-MCNC: 426 MCG/DL (ref 298–536)
TRANSFERRIN SERPL-MCNC: 286 MG/DL (ref 200–360)

## 2022-03-14 PROCEDURE — G0463 HOSPITAL OUTPT CLINIC VISIT: HCPCS

## 2022-03-14 PROCEDURE — 80074 ACUTE HEPATITIS PANEL: CPT

## 2022-03-14 PROCEDURE — 36415 COLL VENOUS BLD VENIPUNCTURE: CPT

## 2022-03-14 PROCEDURE — 83540 ASSAY OF IRON: CPT

## 2022-03-14 PROCEDURE — 99203 OFFICE O/P NEW LOW 30 MIN: CPT | Performed by: INTERNAL MEDICINE

## 2022-03-14 PROCEDURE — 84466 ASSAY OF TRANSFERRIN: CPT

## 2022-03-14 RX ORDER — FLUOXETINE HYDROCHLORIDE 20 MG/1
20 CAPSULE ORAL DAILY
COMMUNITY
Start: 2022-02-10 | End: 2022-06-30

## 2022-03-14 NOTE — PROGRESS NOTES
Timi Hernandez   : 1980     LOCATION: [facility] HEMATOLOGY & ONCOLOGY     Chief Complaint  No chief complaint on file.    Referring Provider: Tushar Rutherford MD  PCP: Tushar Rutherford MD    Oncology/Hematology History    No history exists.         Subjective        History of Present Illness   41-year-old male presents for evaluation for elevated ferritin  Iron studies done 2021 revealed normal iron levels  There is no family history of hemochromatosis  Patient notes a history of elevated liver function tests and recent ultrasound showed that liver was enlarged and was consistent with hepatic steatosis  Patient recently completed treatment for Lyme disease    Review of Systems   Constitutional: Positive for fatigue. Negative for appetite change, diaphoresis, fever, unexpected weight gain and unexpected weight loss.   HENT: Negative for hearing loss, sore throat and voice change.    Eyes: Negative for blurred vision, double vision, pain, redness and visual disturbance.   Respiratory: Negative for cough, shortness of breath and wheezing.    Cardiovascular: Negative for chest pain, palpitations and leg swelling.   Endocrine: Negative for cold intolerance, heat intolerance, polydipsia and polyuria.   Genitourinary: Negative for decreased urine volume, difficulty urinating, frequency and urinary incontinence.   Musculoskeletal: Positive for neck pain and neck stiffness. Negative for arthralgias, back pain, joint swelling and myalgias.   Skin: Negative for color change, rash, skin lesions and wound.   Neurological: Positive for dizziness and headache. Negative for seizures and numbness.   Hematological: Negative for adenopathy. Does not bruise/bleed easily.   Psychiatric/Behavioral: Negative for depressed mood. The patient is not nervous/anxious.    All other systems reviewed and are negative.    Current Outpatient Medications on File Prior to Visit   Medication Sig Dispense Refill    • aspirin 81 MG EC tablet Take 81 mg by mouth Daily.     • cetirizine (zyrTEC) 10 MG tablet Zyrtec 10 mg oral tablet take 1 tablet (10 mg) by oral route once daily   Active     • choline fenofibrate (TRILIPIX) 135 MG capsule Take 135 mg by mouth Daily.     • clonazePAM (KlonoPIN) 1 MG tablet Take 1 mg by mouth As Needed.     • ELDERBERRY PO Take 1 capsule by mouth 2 (Two) Times a Day.     • FLUoxetine (PROzac) 20 MG capsule Take 20 mg by mouth Daily.     • hyoscyamine (LEVSIN) 0.125 MG SL tablet Take 1 tablet by mouth Every 4 (Four) Hours As Needed.     • nadolol (Corgard) 20 MG tablet Take 1 tablet by mouth Daily. 30 tablet 6     Current Facility-Administered Medications on File Prior to Visit   Medication Dose Route Frequency Provider Last Rate Last Admin   • cyanocobalamin injection 1,000 mcg  1,000 mcg Intramuscular Q28 Days Tushar Rutherford MD   1,000 mcg at 03/07/22 1112       Allergies   Allergen Reactions   • Penicillins Anaphylaxis   • Sulfamethoxazole-Trimethoprim Anaphylaxis and Rash     Other reaction(s): sore throat and ulcers   • Colchicine Rash     PT stated body was burning       Past Medical History:   Diagnosis Date   • Anxiety    • Arthritis    • Brugada syndrome 11/16/2021   • Chronic allergic rhinitis    • Essential hypertension    • Family history of colon cancer in mother    • Fatty liver    • GERD (gastroesophageal reflux disease)    • Gout    • Hepatomegaly    • IBS (irritable bowel syndrome)    • Kidney stones    • Lyme disease 2021   • Right bundle branch block (RBBB), 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 Ray   • GALLBLADDER  SURGERY     • HERNIA REPAIR     • LASIK     • TOE SURGERY     • TONSILLECTOMY     • VASECTOMY       Social History     Socioeconomic History   • Marital status:    Tobacco Use   • Smoking status: Never Smoker   • Smokeless tobacco: Never Used   • Tobacco comment: second hand smoke exposure-never   Vaping Use   • Vaping Use: Never used   Substance and Sexual Activity   • Alcohol use: Never   • Drug use: Never   • Sexual activity: Yes     Partners: Female     Birth control/protection: Surgical     Comment: Vasectomy     Family History   Problem Relation Age of Onset   • Heart failure Mother         Heart attack in her early 50s   • Hyperlipidemia Mother    • Heart attack Mother    • Hypertension Mother    • Colon cancer Mother 53   • Stroke Mother    • Heart disease Father    • Stroke Maternal Grandfather    • Heart disease Maternal Grandfather    • Heart attack Other    • Heart disease Other    • Heart failure Other    • Hypertension Other    • Hyperlipidemia Other    • Heart disease Sister    • Lung cancer Maternal Uncle         50s   • Heart disease Maternal Uncle      Immunization History   Administered Date(s) Administered   • COVID-19 (ANG) 03/31/2021, 12/23/2021   • COVID-19 (MODERNA) 1st, 2nd, 3rd Dose Only 12/23/2021   • Hepatitis A 11/17/2000, 07/06/2001   • Hepatitis B 05/05/2004   • Tdap 04/02/2018       Objective     /86   Pulse 56   Temp 97.8 °F (36.6 °C)   Resp 16   Wt 109 kg (240 lb 11.9 oz)   SpO2 97%   BMI 32.65 kg/m²           Physical Exam  Constitutional:       Appearance: Normal appearance.   HENT:      Mouth/Throat:      Mouth: Mucous membranes are moist.   Cardiovascular:      Pulses: Normal pulses.   Pulmonary:      Effort: Pulmonary effort is normal.   Musculoskeletal:         General: Normal range of motion.      Cervical back: Normal range of motion.   Skin:     General: Skin is warm and dry.   Neurological:      Mental Status: He is alert and oriented to person,  place, and time.   Psychiatric:         Mood and Affect: Mood normal.               Iron   Date Value Ref Range Status   01/13/2021 61 (L) 70 - 180 ug/dL Final   02/04/2019 93 70 - 180 ug/dL Final     Iron Saturation   Date Value Ref Range Status   01/13/2021 18 (L) 20 - 55 % Final   02/04/2019 25 20 - 55 % Final     Transferrin   Date Value Ref Range Status   01/13/2021 243.00 215.00 - 365.00 mg/dL Final   02/04/2019 264.00 215.00 - 365.00 mg/dL Final     TIBC   Date Value Ref Range Status   01/13/2021 347 245 - 450 ug/dL Final     Comment:     As of 10/15/03, the chemistry department began utilizing a Transferrin  assay. Data suggests that Transferrin is a better estimate of Total Iron  binding capacity than the TIBC assay. As a result the TIBC will now be a  calculated estimate from the measured Transferrin.     02/04/2019 378 245 - 450 ug/dL Final     Comment:     As of 10/15/03, the chemistry department began utilizing a Transferrin  assay. Data suggests that Transferrin is a better estimate of Total Iron  binding capacity than the TIBC assay. As a result the TIBC will now be a  calculated estimate from the measured Transferrin.       Ferritin   Date Value Ref Range Status   03/01/2022 1,028.00 (H) 30.00 - 400.00 ng/mL Final   01/13/2021 256 30 - 300 ng/mL Final     Comment:     <10 ng/ml usually associated with Iron Deficiency Anemia.  Above normal range levels may be due to Hepatic and/or  Chronic Inflammatory Disease.     02/04/2019 869 (H) 30 - 300 ng/mL Final     Comment:     <10 ng/ml usually associated with Iron Deficiency Anemia.  Above normal range levels may be due to Hepatic and/or  Chronic Inflammatory Disease.       Vitamin B-12   Date Value Ref Range Status   03/01/2022 755 211 - 946 pg/mL Final   12/02/2021 1,041 (H) 211 - 946 pg/mL Final   05/28/2021 765 211 - 911 pg/mL Final   12/17/2020 334 211 - 911 pg/mL Final   06/12/2020 420 211 - 911 pg/mL Final     Folate   Date Value Ref Range Status    03/01/2022 >20.00 4.78 - 24.20 ng/mL Final   12/02/2021 19.10 4.78 - 24.20 ng/mL Final   05/28/2021 11.5 4.8 - 20.0 ng/mL Final     Comment:     Results may be falsely decreased due to light exposure if  testing added on after collection.     12/17/2020 9.6 4.8 - 20.0 ng/mL Final     Comment:     Results may be falsely decreased due to light exposure if  testing added on after collection.     06/12/2020 >20.0 4.8 - 20.0 ng/mL Final     Comment:     Results may be falsely decreased due to light exposure if  testing added on after collection.       ECOG score: 0           PHQ-9 Total Score:           Result Review :   The following data was reviewed by: Chrisat Wolf MD on 03/14/2022:  Lab Results   Component Value Date    HGB 15.0 03/01/2022    HCT 43.9 03/01/2022    MCV 85.7 03/01/2022     03/01/2022    WBC 6.27 03/01/2022    NEUTROABS 2.59 03/01/2022    LYMPHSABS 3.09 03/01/2022    MONOSABS 0.41 03/01/2022    EOSABS 0.12 03/01/2022    BASOSABS 0.03 03/01/2022     Lab Results   Component Value Date    GLUCOSE 101 (H) 03/01/2022    BUN 15 03/01/2022    CREATININE 1.02 03/01/2022     03/01/2022    K 4.2 03/01/2022     03/01/2022    CO2 24.0 03/01/2022    CALCIUM 9.4 03/01/2022    PROTEINTOT 7.7 03/01/2022    ALBUMIN 4.90 03/01/2022    BILITOT 0.6 03/01/2022    ALKPHOS 34 (L) 03/01/2022    AST 77 (H) 03/01/2022     (H) 03/01/2022         Data reviewed: labs          Assessment and Plan      ASSESSMENT   elevated ferritin  PLAN/RECOMMENDATIONS  Ferritin is an acute phase reactant and can be elevated in instances of inflammation.  Elevated ferritin by itself is not diagnostic of any specific disease process  Elevated ferritin with an elevated iron would be consistent with iron overload and hemochromatosis  Prior iron studies revealed normal iron levels and normal iron saturation thus unlikely that this patient has hemochromatosis  Is unclear if the recent lyme disease is the etiology of the  elevated ferritin  I will obtain iron profile to check iron levels  We will check hepatitis panel given the elevated liver function tests    Diagnoses and all orders for this visit:    1. Elevated ferritin level  -     Hepatitis Panel, Acute; Future  -     Iron Profile; Future      I spent 30minutes caring for Timi on this date of service. This time includes time spent by me in the following activities: reviewing tests, obtaining and/or reviewing a separately obtained history, counseling and educating the patient/family/caregiver, ordering medications, tests, or procedures, documenting information in the medical record and independently interpreting results and communicating that information with the patient/family/caregiver      Patient was given instructions and counseling regarding his condition or for health maintenance advice. Please see specific information pulled into the AVS if appropriate.     Christa Wolf MD    3/14/2022

## 2022-03-23 ENCOUNTER — TELEPHONE (OUTPATIENT)
Dept: CARDIOLOGY | Facility: CLINIC | Age: 42
End: 2022-03-23

## 2022-03-31 ENCOUNTER — OFFICE VISIT (OUTPATIENT)
Dept: GASTROENTEROLOGY | Facility: CLINIC | Age: 42
End: 2022-03-31

## 2022-03-31 VITALS
HEIGHT: 72 IN | WEIGHT: 239.4 LBS | HEART RATE: 91 BPM | TEMPERATURE: 97.8 F | BODY MASS INDEX: 32.43 KG/M2 | SYSTOLIC BLOOD PRESSURE: 121 MMHG | DIASTOLIC BLOOD PRESSURE: 83 MMHG

## 2022-03-31 DIAGNOSIS — K57.90 DIVERTICULOSIS: ICD-10-CM

## 2022-03-31 DIAGNOSIS — R79.89 ELEVATED LFTS: Primary | ICD-10-CM

## 2022-03-31 DIAGNOSIS — R79.89 ELEVATED FERRITIN: ICD-10-CM

## 2022-03-31 DIAGNOSIS — K76.0 FATTY LIVER: ICD-10-CM

## 2022-03-31 PROCEDURE — 99214 OFFICE O/P EST MOD 30 MIN: CPT | Performed by: NURSE PRACTITIONER

## 2022-04-05 ENCOUNTER — LAB (OUTPATIENT)
Dept: LAB | Facility: HOSPITAL | Age: 42
End: 2022-04-05

## 2022-04-05 DIAGNOSIS — R79.89 ELEVATED LFTS: ICD-10-CM

## 2022-04-05 DIAGNOSIS — R79.89 ELEVATED FERRITIN: ICD-10-CM

## 2022-04-05 PROCEDURE — 81256 HFE GENE: CPT

## 2022-04-05 PROCEDURE — 36415 COLL VENOUS BLD VENIPUNCTURE: CPT

## 2022-04-12 LAB — HFE GENE MUT ANL BLD/T: NORMAL

## 2022-04-18 ENCOUNTER — PATIENT MESSAGE (OUTPATIENT)
Dept: GASTROENTEROLOGY | Facility: CLINIC | Age: 42
End: 2022-04-18

## 2022-04-19 ENCOUNTER — HOSPITAL ENCOUNTER (OUTPATIENT)
Dept: CARDIOLOGY | Facility: HOSPITAL | Age: 42
Discharge: HOME | End: 2022-04-19
Admitting: PSYCHIATRY & NEUROLOGY

## 2022-04-19 VITALS — WEIGHT: 235 LBS | HEIGHT: 72 IN | BODY MASS INDEX: 31.83 KG/M2

## 2022-04-19 DIAGNOSIS — R42 DIZZINESS: ICD-10-CM

## 2022-04-19 PROCEDURE — 93660 TILT TABLE EVALUATION: CPT | Performed by: INTERNAL MEDICINE

## 2022-04-19 PROCEDURE — 93660 TILT TABLE EVALUATION: CPT

## 2022-04-19 NOTE — TELEPHONE ENCOUNTER
From: Timi Hernandez  To: NICOLE Estrada  Sent: 4/18/2022 5:22 PM EDT  Subject: Meds    Can you please put some refills in for this hyoscyamine 0.125 MG SL tablet. [facility] won’t let me order it.     Dontrell Hernandez  [phone]

## 2022-04-19 NOTE — DISCHARGE INSTRUCTIONS
Cardiovascular Services Phone Number: [phone]    Normal activities may be resumed after you are released from the hospital.  Normal consumption of foods and liquids may be resumed immediately after the study.  Contact your regular physician for further evaluation if your symptoms occur again.  Resume your preventative medications, following your doctor's instructions.  Notify your doctor if you experience your symptoms while taking your medications  Do not stop taking your medication without notifying your doctor.      In the event of an emergency, call 911 or go to your nearest emergency room.

## 2022-04-22 LAB
MAXIMAL PREDICTED HEART RATE: 179 BPM
STRESS TARGET HR: 152 BPM

## 2022-05-20 ENCOUNTER — LAB (OUTPATIENT)
Dept: LAB | Facility: HOSPITAL | Age: 42
End: 2022-05-20

## 2022-05-20 DIAGNOSIS — K76.0 FATTY LIVER: ICD-10-CM

## 2022-05-20 DIAGNOSIS — E78.5 DYSLIPIDEMIA: ICD-10-CM

## 2022-05-20 DIAGNOSIS — I10 ESSENTIAL HYPERTENSION: ICD-10-CM

## 2022-05-20 DIAGNOSIS — R79.89 ELEVATED LFTS: ICD-10-CM

## 2022-05-20 LAB
ALBUMIN SERPL-MCNC: 4.8 G/DL (ref 3.5–5.2)
ALP SERPL-CCNC: 34 U/L (ref 39–117)
ALT SERPL W P-5'-P-CCNC: 111 U/L (ref 1–41)
AST SERPL-CCNC: 62 U/L (ref 1–40)
BILIRUB CONJ SERPL-MCNC: <0.2 MG/DL (ref 0–0.3)
BILIRUB INDIRECT SERPL-MCNC: ABNORMAL MG/DL
BILIRUB SERPL-MCNC: 0.6 MG/DL (ref 0–1.2)
CHOLEST SERPL-MCNC: 254 MG/DL (ref 0–200)
DEPRECATED RDW RBC AUTO: 41.6 FL (ref 37–54)
ERYTHROCYTE [DISTWIDTH] IN BLOOD BY AUTOMATED COUNT: 13.3 % (ref 12.3–15.4)
HCT VFR BLD AUTO: 42.1 % (ref 37.5–51)
HDLC SERPL-MCNC: 30 MG/DL (ref 40–60)
HGB BLD-MCNC: 14.2 G/DL (ref 13–17.7)
INR PPP: 1.08 (ref 0.86–1.15)
LDLC SERPL CALC-MCNC: 153 MG/DL (ref 0–100)
LDLC/HDLC SERPL: 4.95 {RATIO}
MCH RBC QN AUTO: 29.2 PG (ref 26.6–33)
MCHC RBC AUTO-ENTMCNC: 33.7 G/DL (ref 31.5–35.7)
MCV RBC AUTO: 86.4 FL (ref 79–97)
PLATELET # BLD AUTO: 202 10*3/MM3 (ref 140–450)
PMV BLD AUTO: 10.7 FL (ref 6–12)
PROT SERPL-MCNC: 7.3 G/DL (ref 6–8.5)
PROTHROMBIN TIME: 14.1 SECONDS (ref 11.8–14.9)
RBC # BLD AUTO: 4.87 10*6/MM3 (ref 4.14–5.8)
TRIGL SERPL-MCNC: 377 MG/DL (ref 0–150)
VLDLC SERPL-MCNC: 71 MG/DL (ref 5–40)
WBC NRBC COR # BLD: 6.51 10*3/MM3 (ref 3.4–10.8)

## 2022-05-20 PROCEDURE — 85027 COMPLETE CBC AUTOMATED: CPT

## 2022-05-20 PROCEDURE — 80076 HEPATIC FUNCTION PANEL: CPT

## 2022-05-20 PROCEDURE — 85610 PROTHROMBIN TIME: CPT

## 2022-05-20 PROCEDURE — 36415 COLL VENOUS BLD VENIPUNCTURE: CPT

## 2022-05-20 PROCEDURE — 80061 LIPID PANEL: CPT

## 2022-05-23 ENCOUNTER — TELEPHONE (OUTPATIENT)
Dept: CARDIOLOGY | Facility: CLINIC | Age: 42
End: 2022-05-23

## 2022-05-23 ENCOUNTER — TELEPHONE (OUTPATIENT)
Dept: GASTROENTEROLOGY | Facility: CLINIC | Age: 42
End: 2022-05-23

## 2022-05-23 NOTE — TELEPHONE ENCOUNTER
I called pt to inform of lab results, pt agreeable to continuing Low-carb diet/exercise and keeping f/u in October.

## 2022-05-23 NOTE — TELEPHONE ENCOUNTER
----- Message from NICOLE Strickland sent at 5/23/2022  8:42 AM EDT -----  Triglycerides and liver function are slightly improved but LDL is still not to goal

## 2022-05-24 ENCOUNTER — HOSPITAL ENCOUNTER (OUTPATIENT)
Dept: ULTRASOUND IMAGING | Facility: HOSPITAL | Age: 42
Discharge: HOME | End: 2022-05-24
Admitting: NURSE PRACTITIONER

## 2022-05-24 DIAGNOSIS — K76.0 FATTY LIVER: ICD-10-CM

## 2022-05-24 DIAGNOSIS — R79.89 ELEVATED LFTS: ICD-10-CM

## 2022-05-24 PROCEDURE — 76705 ECHO EXAM OF ABDOMEN: CPT

## 2022-06-01 ENCOUNTER — TELEPHONE (OUTPATIENT)
Dept: FAMILY MEDICINE CLINIC | Facility: CLINIC | Age: 42
End: 2022-06-01

## 2022-06-01 ENCOUNTER — OFFICE VISIT (OUTPATIENT)
Dept: CARDIOLOGY | Facility: CLINIC | Age: 42
End: 2022-06-01

## 2022-06-01 VITALS
SYSTOLIC BLOOD PRESSURE: 131 MMHG | BODY MASS INDEX: 32.1 KG/M2 | WEIGHT: 237 LBS | HEART RATE: 60 BPM | DIASTOLIC BLOOD PRESSURE: 79 MMHG | HEIGHT: 72 IN

## 2022-06-01 DIAGNOSIS — E78.5 DYSLIPIDEMIA: ICD-10-CM

## 2022-06-01 DIAGNOSIS — I49.8 BRUGADA SYNDROME: ICD-10-CM

## 2022-06-01 DIAGNOSIS — I45.3: ICD-10-CM

## 2022-06-01 DIAGNOSIS — I45.81 LONG Q-T SYNDROME: ICD-10-CM

## 2022-06-01 DIAGNOSIS — I10 ESSENTIAL HYPERTENSION: Primary | ICD-10-CM

## 2022-06-01 PROCEDURE — 99214 OFFICE O/P EST MOD 30 MIN: CPT | Performed by: INTERNAL MEDICINE

## 2022-06-01 RX ORDER — NIACIN 500 MG/1
500 TABLET, EXTENDED RELEASE ORAL NIGHTLY
Qty: 90 TABLET | Refills: 3 | Status: SHIPPED | OUTPATIENT
Start: 2022-06-01 | End: 2022-09-01

## 2022-06-01 NOTE — ASSESSMENT & PLAN NOTE
Patient with previous EP testing showing no inducible arrhythmias is on chronic nadolol 20 mg once a day

## 2022-06-01 NOTE — TELEPHONE ENCOUNTER
Patient is requesting to speak with Clemencia Salas's medical assistant about his b-12 injections.

## 2022-06-01 NOTE — ASSESSMENT & PLAN NOTE
Patient with improvement in his triglycerides but still above 300 with evidence of fatty liver disease recommended a trial of Niaspan 500 nightly counseled on side effects recommend taking aspirin 30 minutes prior we will get repeat LFTs in 1 and 3 months

## 2022-06-01 NOTE — TELEPHONE ENCOUNTER
Patient returned phone call and was wondering why he could not have a B12 shot when he came into office today for a nurse visit. Informed pt that since he is a previous [facility] patient and he has not yet been seen by Clemencia to get blood work we could not give him a B12 injection until we have seen him and did some labs on patient, he voiced understanding and has a appointment for 6/16.

## 2022-06-01 NOTE — PROGRESS NOTES
Chief Complaint  Fatigue, Hypertension, and Dizziness    Subjective    Patient has been doing well denies any complaints of chest pain shortness of breath or heart palpitations.  He is excited about 3 times per week with walking    Past Medical History:   Diagnosis Date   • Anxiety    • Arthritis    • Brugada syndrome 11/16/2021   • Chronic allergic rhinitis    • Essential hypertension    • Family history of colon cancer in mother    • Fatty liver    • GERD (gastroesophageal reflux disease)    • Gout    • Hepatomegaly    • Hyperlipidemia    • IBS (irritable bowel syndrome)    • Kidney stones    • Lyme disease 2021   • Right bundle branch block (RBBB), 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)    • Syncope          Current Outpatient Medications:   •  aspirin 81 MG EC tablet, Take 81 mg by mouth Daily., Disp: , Rfl:   •  cetirizine (zyrTEC) 10 MG tablet, Zyrtec 10 mg oral tablet take 1 tablet (10 mg) by oral route once daily   Active, Disp: , Rfl:   •  choline fenofibrate (TRILIPIX) 135 MG capsule, Take 135 mg by mouth Daily., Disp: , Rfl:   •  clonazePAM (KlonoPIN) 1 MG tablet, Take 1 mg by mouth As Needed., Disp: , Rfl:   •  ELDERBERRY PO, Take 1 capsule by mouth 2 (Two) Times a Day., Disp: , Rfl:   •  FLUoxetine (PROzac) 20 MG capsule, Take 20 mg by mouth Daily., Disp: , Rfl:   •  hyoscyamine (LEVSIN) 0.125 MG SL tablet, PLACE 1 TABLET UNDER THE TONGUE EVERY 4-6 HOURS AS NEEDED FOR DIARRHEA/ABDOMINAL PAIN, Disp: 270 tablet, Rfl: 1  •  Icosapent Ethyl (VASCEPA PO), Take 2,000 mg by mouth 2 (Two) Times a Day., Disp: , Rfl:   •  nadolol (Corgard) 20 MG tablet, Take 1 tablet by mouth Daily., Disp: 30 tablet, Rfl: 6  •  niacin (NIASPAN) 500 MG CR tablet, Take 1 tablet by mouth Every Night., Disp: 90 tablet, Rfl: 3    Current Facility-Administered Medications:   •  cyanocobalamin injection 1,000 mcg, 1,000 mcg, Intramuscular, Q28 Days, Tushar Rutherford MD, 1,000 mcg at 03/07/22  "1112    Medications Discontinued During This Encounter   Medication Reason   • fluticasone (FLONASE) 50 MCG/ACT nasal spray *Therapy completed     Allergies   Allergen Reactions   • Penicillins Anaphylaxis   • Sulfamethoxazole-Trimethoprim Anaphylaxis and Rash     Other reaction(s): sore throat and ulcers   • Colchicine Rash     PT stated body was burning        Social History     Tobacco Use   • Smoking status: Never Smoker   • Smokeless tobacco: Never Used   • Tobacco comment: second hand smoke exposure-never   Vaping Use   • Vaping Use: Never used   Substance Use Topics   • Alcohol use: Never   • Drug use: Never       Family History   Problem Relation Age of Onset   • Heart failure Mother         Heart attack in her early 50s   • Hyperlipidemia Mother    • Heart attack Mother    • Hypertension Mother    • Colon cancer Mother 53   • Stroke Mother    • Heart disease Sister    • Lung cancer Maternal Uncle         50s   • Heart disease Maternal Uncle    • Stroke Maternal Grandfather    • Heart disease Maternal Grandfather    • Heart attack Other    • Heart disease Other    • Heart failure Other    • Hypertension Other    • Hyperlipidemia Other         Objective     /79   Pulse 60   Ht 182.9 cm (72\")   Wt 108 kg (237 lb)   BMI 32.14 kg/m²       Physical Exam    General Appearance:   · no acute distress  · Alert and oriented x3  HENT:   · lips not cyanotic  · Atraumatic  Neck:  · No jvd   · supple  Respiratory:  · no respiratory distress  · normal breath sounds  · no rales  Cardiovascular:  · Regular rate and rhythm  · no S3, no S4   · no murmur  · no rub  Extremities  · No cyanosis  · lower extremity edema: none    Skin:   · warm, dry  · No rashes      Result Review :     No results found for: PROBNP  CMP    CMP 2/7/22 3/1/22 5/20/22   Glucose  101 (A)    BUN  15    Creatinine  1.02    Sodium  136    Potassium  4.2    Chloride  100    Calcium  9.4    Albumin 5.00 4.90 4.80   Total Bilirubin 0.6 0.6 0.6 "   Alkaline Phosphatase 38 (A) 34 (A) 34 (A)   AST (SGOT) 111 (A) 77 (A) 62 (A)   ALT (SGPT) 186 (A) 159 (A) 111 (A)   (A) Abnormal value            CBC w/diff    CBC w/Diff 2/3/22 3/1/22 5/20/22   WBC 6.85 6.27 6.51   RBC 4.85 5.12 4.87   Hemoglobin 14.1 15.0 14.2   Hematocrit 39.8 43.9 42.1   MCV 82.1 85.7 86.4   MCH 29.1 29.3 29.2   MCHC 35.4 34.2 33.7   RDW 13.2 14.1 13.3   Platelets 187 201 202   Neutrophil Rel %  41.3 (A)    Immature Granulocyte Rel %  0.5    Lymphocyte Rel %  49.3 (A)    Monocyte Rel %  6.5    Eosinophil Rel %  1.9    Basophil Rel %  0.5    (A) Abnormal value             Lab Results   Component Value Date    TSH 1.640 03/01/2022      Lab Results   Component Value Date    FREET4 1.20 03/01/2022      No results found for: DDIMERQUANT  No results found for: MG   No results found for: DIGOXIN   No results found for: TROPONINT        Lipid Panel    Lipid Panel 1/31/22 3/1/22 5/20/22   Total Cholesterol 241 (A) 251 (A) 254 (A)   Triglycerides 547 (A) 389 (A) 377 (A)   HDL Cholesterol 27 (A) 32 (A) 30 (A)   VLDL Cholesterol 97 (A) 72 (A) 71 (A)   LDL Cholesterol  117 (A) 147 (A) 153 (A)   LDL/HDL Ratio 3.87 4.41 4.95   (A) Abnormal value            No results found for: POCTROP                   Diagnoses and all orders for this visit:    1. Essential hypertension (Primary)  Assessment & Plan:  Patient with controlled blood pressure continue on nadolol 20 daily      2. Dyslipidemia  Assessment & Plan:  Patient with improvement in his triglycerides but still above 300 with evidence of fatty liver disease recommended a trial of Niaspan 500 nightly counseled on side effects recommend taking aspirin 30 minutes prior we will get repeat LFTs in 3 months    Orders:  -     Lipid Panel; Future  -     Hepatic Function Panel; Future    3. Right bundle branch block (RBBB), 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)    4. Long Q-T syndrome  Assessment & Plan:  Patient with previous  EP testing showing no inducible arrhythmias is on chronic nadolol 20 mg once a day      5. Brugada syndrome  Assessment & Plan:  1.  Long QT 3 syndrome/Possible Brugada Syndrome  a. Uncle with reported long QT syndrome. Grandfather with sudden cardiac death at age 36   b. Echo 3/29/2021 ejection fraction 55%, no significant valvular heart issues.  c. Stress Test 3/29/2021 good exercise tolerance, and exercise EKG negative for ischemia, atypical chest pain and abdominal pain with exercise, no arrhythmias seen.  Study is consistent with no reversible ischemia at rest or with exercise.  This indicates a low probability of coronary artery disease in this individual  d. 3/22/2021 48-hour Holter monitor normal sinus rhythm average heart rate of 86 bpm, 8 episodes of PACs, one 5 beat run of supraventricular tachycardia in the SVT consistent with ectopic atrial tachycardia  e. 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 Cardiac MRI 10/15/2021: Normal LV systolic function EF 68%, no CMR evidence for scar or fibrosis, normal size RV with mildly reduced function of 46%  g. Event Monitor 11/5-11/19/2022: NSR HR  bpm, average HR 72. No VT. Less than 1% PACs, no PVCs         Other orders  -     niacin (NIASPAN) 500 MG CR tablet; Take 1 tablet by mouth Every Night.  Dispense: 90 tablet; Refill: 3          Follow Up {Instructions Charge Capture  Follow-up Communications :23}    No follow-ups on file.          Patient was given instructions and counseling regarding his condition or for health maintenance advice. Please see specific information pulled into the AVS if appropriate.

## 2022-06-01 NOTE — ASSESSMENT & PLAN NOTE
1.  Long QT 3 syndrome/Possible Brugada Syndrome  a. Uncle with reported long QT syndrome. Grandfather with sudden cardiac death at age 36   b. Echo 3/29/2021 ejection fraction 55%, no significant valvular heart issues.  c. Stress Test 3/29/2021 good exercise tolerance, and exercise EKG negative for ischemia, atypical chest pain and abdominal pain with exercise, no arrhythmias seen.  Study is consistent with no reversible ischemia at rest or with exercise.  This indicates a low probability of coronary artery disease in this individual  d. 3/22/2021 48-hour Holter monitor normal sinus rhythm average heart rate of 86 bpm, 8 episodes of PACs, one 5 beat run of supraventricular tachycardia in the SVT consistent with ectopic atrial tachycardia  e. 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 Cardiac MRI 10/15/2021: Normal LV systolic function EF 68%, no CMR evidence for scar or fibrosis, normal size RV with mildly reduced function of 46%  g. Event Monitor 11/5-11/19/2022: NSR HR  bpm, average HR 72. No VT. Less than 1% PACs, no PVCs

## 2022-06-16 ENCOUNTER — LAB (OUTPATIENT)
Dept: LAB | Facility: HOSPITAL | Age: 42
End: 2022-06-16

## 2022-06-16 ENCOUNTER — OFFICE VISIT (OUTPATIENT)
Dept: FAMILY MEDICINE CLINIC | Facility: CLINIC | Age: 42
End: 2022-06-16

## 2022-06-16 VITALS
HEART RATE: 62 BPM | DIASTOLIC BLOOD PRESSURE: 82 MMHG | WEIGHT: 238.8 LBS | TEMPERATURE: 98 F | RESPIRATION RATE: 17 BRPM | BODY MASS INDEX: 32.34 KG/M2 | HEIGHT: 72 IN | SYSTOLIC BLOOD PRESSURE: 114 MMHG | OXYGEN SATURATION: 97 %

## 2022-06-16 DIAGNOSIS — H93.13 TINNITUS OF BOTH EARS: ICD-10-CM

## 2022-06-16 DIAGNOSIS — R73.01 IMPAIRED FASTING GLUCOSE: ICD-10-CM

## 2022-06-16 DIAGNOSIS — M25.561 CHRONIC PAIN OF BOTH KNEES: ICD-10-CM

## 2022-06-16 DIAGNOSIS — I10 ESSENTIAL HYPERTENSION: ICD-10-CM

## 2022-06-16 DIAGNOSIS — R41.89 BRAIN FOG: ICD-10-CM

## 2022-06-16 DIAGNOSIS — R42 LIGHTHEADEDNESS: ICD-10-CM

## 2022-06-16 DIAGNOSIS — F43.10 PTSD (POST-TRAUMATIC STRESS DISORDER): ICD-10-CM

## 2022-06-16 DIAGNOSIS — G89.29 CHRONIC BILATERAL LOW BACK PAIN WITH BILATERAL SCIATICA: ICD-10-CM

## 2022-06-16 DIAGNOSIS — K58.9 IRRITABLE BOWEL SYNDROME, UNSPECIFIED TYPE: ICD-10-CM

## 2022-06-16 DIAGNOSIS — R53.83 FATIGUE, UNSPECIFIED TYPE: ICD-10-CM

## 2022-06-16 DIAGNOSIS — R42 DIZZINESS: ICD-10-CM

## 2022-06-16 DIAGNOSIS — Z01.89 ROUTINE LAB DRAW: ICD-10-CM

## 2022-06-16 DIAGNOSIS — K21.9 GASTROESOPHAGEAL REFLUX DISEASE WITHOUT ESOPHAGITIS: ICD-10-CM

## 2022-06-16 DIAGNOSIS — R53.83 FATIGUE, UNSPECIFIED TYPE: Primary | ICD-10-CM

## 2022-06-16 DIAGNOSIS — I49.8 BRUGADA SYNDROME: ICD-10-CM

## 2022-06-16 DIAGNOSIS — M54.42 CHRONIC BILATERAL LOW BACK PAIN WITH BILATERAL SCIATICA: ICD-10-CM

## 2022-06-16 DIAGNOSIS — E78.5 DYSLIPIDEMIA: ICD-10-CM

## 2022-06-16 DIAGNOSIS — Z13.220 SCREENING FOR LIPID DISORDERS: ICD-10-CM

## 2022-06-16 DIAGNOSIS — R94.31 PROLONGED QT INTERVAL: ICD-10-CM

## 2022-06-16 DIAGNOSIS — G89.29 CHRONIC PAIN OF BOTH KNEES: ICD-10-CM

## 2022-06-16 DIAGNOSIS — K76.0 HEPATIC STEATOSIS: ICD-10-CM

## 2022-06-16 DIAGNOSIS — M25.562 CHRONIC PAIN OF BOTH KNEES: ICD-10-CM

## 2022-06-16 DIAGNOSIS — M54.41 CHRONIC BILATERAL LOW BACK PAIN WITH BILATERAL SCIATICA: ICD-10-CM

## 2022-06-16 DIAGNOSIS — Z86.19 HISTORY OF LYME DISEASE: ICD-10-CM

## 2022-06-16 LAB
25(OH)D3 SERPL-MCNC: 34.7 NG/ML (ref 30–100)
ALBUMIN SERPL-MCNC: 4.8 G/DL (ref 3.5–5.2)
ALBUMIN/GLOB SERPL: 2.1 G/DL
ALP SERPL-CCNC: 35 U/L (ref 39–117)
ALT SERPL W P-5'-P-CCNC: 101 U/L (ref 1–41)
ANION GAP SERPL CALCULATED.3IONS-SCNC: 10.6 MMOL/L (ref 5–15)
AST SERPL-CCNC: 56 U/L (ref 1–40)
BASOPHILS # BLD AUTO: 0.03 10*3/MM3 (ref 0–0.2)
BASOPHILS NFR BLD AUTO: 0.4 % (ref 0–1.5)
BILIRUB CONJ SERPL-MCNC: <0.2 MG/DL (ref 0–0.3)
BILIRUB SERPL-MCNC: 0.6 MG/DL (ref 0–1.2)
BILIRUB UR QL STRIP: NEGATIVE
BUN SERPL-MCNC: 16 MG/DL (ref 6–20)
BUN/CREAT SERPL: 16.7 (ref 7–25)
CALCIUM SPEC-SCNC: 9.4 MG/DL (ref 8.6–10.5)
CHLORIDE SERPL-SCNC: 102 MMOL/L (ref 98–107)
CHOLEST SERPL-MCNC: 228 MG/DL (ref 0–200)
CLARITY UR: CLEAR
CO2 SERPL-SCNC: 25.4 MMOL/L (ref 22–29)
COLOR UR: YELLOW
CREAT SERPL-MCNC: 0.96 MG/DL (ref 0.76–1.27)
DEPRECATED RDW RBC AUTO: 40.1 FL (ref 37–54)
EGFRCR SERPLBLD CKD-EPI 2021: 101.8 ML/MIN/1.73
EOSINOPHIL # BLD AUTO: 0.12 10*3/MM3 (ref 0–0.4)
EOSINOPHIL NFR BLD AUTO: 1.8 % (ref 0.3–6.2)
ERYTHROCYTE [DISTWIDTH] IN BLOOD BY AUTOMATED COUNT: 13.4 % (ref 12.3–15.4)
FERRITIN SERPL-MCNC: 712 NG/ML (ref 30–400)
FOLATE SERPL-MCNC: 13.3 NG/ML (ref 4.78–24.2)
GLOBULIN UR ELPH-MCNC: 2.3 GM/DL
GLUCOSE SERPL-MCNC: 91 MG/DL (ref 65–99)
GLUCOSE UR STRIP-MCNC: NEGATIVE MG/DL
HBA1C MFR BLD: 5.6 % (ref 4.8–5.6)
HCT VFR BLD AUTO: 38.1 % (ref 37.5–51)
HDLC SERPL-MCNC: 30 MG/DL (ref 40–60)
HGB BLD-MCNC: 13.6 G/DL (ref 13–17.7)
HGB UR QL STRIP.AUTO: NEGATIVE
IMM GRANULOCYTES # BLD AUTO: 0.04 10*3/MM3 (ref 0–0.05)
IMM GRANULOCYTES NFR BLD AUTO: 0.6 % (ref 0–0.5)
KETONES UR QL STRIP: NEGATIVE
LDLC SERPL CALC-MCNC: 135 MG/DL (ref 0–100)
LDLC/HDLC SERPL: 4.3 {RATIO}
LEUKOCYTE ESTERASE UR QL STRIP.AUTO: NEGATIVE
LYMPHOCYTES # BLD AUTO: 3.25 10*3/MM3 (ref 0.7–3.1)
LYMPHOCYTES NFR BLD AUTO: 48 % (ref 19.6–45.3)
MCH RBC QN AUTO: 30 PG (ref 26.6–33)
MCHC RBC AUTO-ENTMCNC: 35.7 G/DL (ref 31.5–35.7)
MCV RBC AUTO: 84.1 FL (ref 79–97)
MONOCYTES # BLD AUTO: 0.44 10*3/MM3 (ref 0.1–0.9)
MONOCYTES NFR BLD AUTO: 6.5 % (ref 5–12)
NEUTROPHILS NFR BLD AUTO: 2.89 10*3/MM3 (ref 1.7–7)
NEUTROPHILS NFR BLD AUTO: 42.7 % (ref 42.7–76)
NITRITE UR QL STRIP: NEGATIVE
NRBC BLD AUTO-RTO: 0.1 /100 WBC (ref 0–0.2)
PH UR STRIP.AUTO: 6 [PH] (ref 5–8)
PLATELET # BLD AUTO: 181 10*3/MM3 (ref 140–450)
PMV BLD AUTO: 10.7 FL (ref 6–12)
POTASSIUM SERPL-SCNC: 4.3 MMOL/L (ref 3.5–5.2)
PROT SERPL-MCNC: 7.1 G/DL (ref 6–8.5)
PROT UR QL STRIP: NEGATIVE
RBC # BLD AUTO: 4.53 10*6/MM3 (ref 4.14–5.8)
SODIUM SERPL-SCNC: 138 MMOL/L (ref 136–145)
SP GR UR STRIP: 1.02 (ref 1–1.03)
TESTOST SERPL-MCNC: 388 NG/DL (ref 249–836)
TRIGL SERPL-MCNC: 345 MG/DL (ref 0–150)
TSH SERPL DL<=0.05 MIU/L-ACNC: 1.7 UIU/ML (ref 0.27–4.2)
UROBILINOGEN UR QL STRIP: NORMAL
VIT B12 BLD-MCNC: 563 PG/ML (ref 211–946)
VLDLC SERPL-MCNC: 63 MG/DL (ref 5–40)
WBC NRBC COR # BLD: 6.77 10*3/MM3 (ref 3.4–10.8)

## 2022-06-16 PROCEDURE — 82570 ASSAY OF URINE CREATININE: CPT

## 2022-06-16 PROCEDURE — 84466 ASSAY OF TRANSFERRIN: CPT

## 2022-06-16 PROCEDURE — 81003 URINALYSIS AUTO W/O SCOPE: CPT

## 2022-06-16 PROCEDURE — 83036 HEMOGLOBIN GLYCOSYLATED A1C: CPT

## 2022-06-16 PROCEDURE — 84403 ASSAY OF TOTAL TESTOSTERONE: CPT

## 2022-06-16 PROCEDURE — 86663 EPSTEIN-BARR ANTIBODY: CPT

## 2022-06-16 PROCEDURE — 82533 TOTAL CORTISOL: CPT

## 2022-06-16 PROCEDURE — 86665 EPSTEIN-BARR CAPSID VCA: CPT

## 2022-06-16 PROCEDURE — 82306 VITAMIN D 25 HYDROXY: CPT

## 2022-06-16 PROCEDURE — 82728 ASSAY OF FERRITIN: CPT

## 2022-06-16 PROCEDURE — 99214 OFFICE O/P EST MOD 30 MIN: CPT

## 2022-06-16 PROCEDURE — 80061 LIPID PANEL: CPT

## 2022-06-16 PROCEDURE — 82607 VITAMIN B-12: CPT

## 2022-06-16 PROCEDURE — 82746 ASSAY OF FOLIC ACID SERUM: CPT

## 2022-06-16 PROCEDURE — 80050 GENERAL HEALTH PANEL: CPT

## 2022-06-16 PROCEDURE — 36415 COLL VENOUS BLD VENIPUNCTURE: CPT

## 2022-06-16 PROCEDURE — 82248 BILIRUBIN DIRECT: CPT

## 2022-06-16 PROCEDURE — 82043 UR ALBUMIN QUANTITATIVE: CPT

## 2022-06-16 PROCEDURE — 83540 ASSAY OF IRON: CPT

## 2022-06-16 NOTE — PROGRESS NOTES
Timi Hernandez presents to [facility] with complaints of persistent issues with fatigue, dizziness, lightheadedness, and brain fog, patient is also here to transfer care.      History of Present Illness  This is a 41-year-old male, past medical history significant for hepatic steatosis, diverticulitis/diverticulosis, irritable bowel syndrome, bilateral knee pain chronic, chronic lower back pain, PTSD, tinnitus, prolonged QT interval, Brugada syndrome gene, hyperlipidemia, history of Lyme disease, history of kidney stones, who presents to the clinic today with complaints of persistent issues with fatigue, dizziness, lightheadedness, and brain fog.    Hepatic steatosis/diverticulosis/irritable bowel syndrome: Patient does see a GI specialist for this, was found to have triglycerides in the 800s, was also found to have hepatic steatosis and fatty liver, patient does get scans every 6 months, also get his labs checked frequently as well to monitor.  Patient states has not had any acute issues with this at this time, but is monitored very closely and follows up with GI regularly.  Patient also has issues with acid reflux, typically tries to avoid foods.  Has adjusted diet to help with liver/GERD symptoms.    Chronic bilateral knee pain/chronic lower back pain: Patient was in the , had extensive tours overseas, and states that he is disabled due to chronic knee pain and chronic lower back pain.  Patient's not have any acute issues with this at this time, but does try to manage this as best he can.    PTSD: Patient does see psychiatry for this, he states is from the .  Currently on Prozac and as needed clonazepam.  Patient states that he will have intermittent anxiety attacks in the middle of the day, states that they are worse in the middle of the day, and states that he typically needs to take a Klonopin when these occur.  Doing his best he can, does follow-up  with psychiatry regularly.  No acute issues at this time.    Prolonged QT interval/Brugada syndrome gene: Patient does see cardiology and cardiac electrophysiologist.  Has had extensive work-up of this, currently is on medication for this as well.  Cardiology also manages hyperlipidemia, has been working on trying to get his triglycerides lowered as well.  Did have recently a heart cath done to make sure that he does not need a pacemaker implanted, did okay with this, was not found to have any abnormalities.  They do keep a close eye on him though to make sure that he does not have any issues in regards to this.    History of Lyme disease/fatigue/lightheadedness/dizziness/brain fog: Patient did have this last year, states that he has was having a lot of issues with fatigue and lightheadedness, dizziness, and brain fog.  Has had extensive work-up, had been seen by neurologist evaluated with MRI/EEG and other testing which all came back inconclusive.  Patient had even seen a rheumatologist, had work-up for autoimmune which all came back inconclusive as well.  Then patient was tested for Lyme disease, just to rule this out as it was going around a lot last year, did test positive for it at that time.  Was treated for this with doxycycline, but states that his symptoms still persist.  He has had a bunch of lab work done as well, has been tested for several things, but is never been tested in the hormone range.  Has seen everybody but endocrinology.  Was told on a previous visit with his neurologist that he does have a touch of diabetes based off of a glucose testing that they did.  Is okay with doing further work-up, does want a try to figure out what is going on.  He has been experiencing this for about a year and a half.  Patient is also wondering if it could be related to food allergy, would like to be tested for it as well.    Patient up-to-date on preventative screenings, will replete blood work today.    Past  Medical History:   Diagnosis Date   • ADHD (attention deficit hyperactivity disorder) 1992   • Anxiety    • Arthritis    • Brugada syndrome 11/16/2021   • Chronic allergic rhinitis    • Essential hypertension    • Family history of colon cancer in mother    • Fatty liver    • GERD (gastroesophageal reflux disease)    • Gout    • Hepatomegaly    • Hyperlipidemia    • IBS (irritable bowel syndrome)    • Kidney stones    • Lyme disease 2021   • Right bundle branch block (RBBB), 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)    • Syncope      Past Surgical History:   • CARDIAC ELECTROPHYSIOLOGY PROCEDURE    Procedure: EPS + Procainimide Challenge +/- SICD (BSC), no meds to hold;  Surgeon: Lang Sorensen MD;  Location: [facility] INVASIVE LOCATION;  Service: Cardiology;  Laterality: N/A;   • CHOLECYSTECTOMY    Dr. Ray   • COLONOSCOPY   • GALLBLADDER SURGERY   • HERNIA REPAIR   • LASIK   • TOE SURGERY   • TONSILLECTOMY   • VASECTOMY       Social History     Socioeconomic History   • Marital status:    Tobacco Use   • Smoking status: Never Smoker   • Smokeless tobacco: Never Used   • Tobacco comment: second hand smoke exposure-never   Vaping Use   • Vaping Use: Never used   Substance and Sexual Activity   • Alcohol use: Not Currently   • Drug use: Never   • Sexual activity: Yes     Partners: Female     Birth control/protection: Surgical     Comment: Vasectomy     Socioeconomic History   • Marital status:    Tobacco Use   • Smoking status: Never Smoker   • Smokeless tobacco: Never Used   • Tobacco comment: second hand smoke exposure-never   Vaping Use   • Vaping Use: Never used   Substance and Sexual Activity   • Alcohol use: Not Currently   • Drug use: Never   • Sexual activity: Yes     Partners: Female     Birth control/protection: Surgical     Comment: Vasectomy      Problem Relation Age of Onset   • Heart failure Mother         Heart attack in her early 50s   •  "Hyperlipidemia Mother    • Heart attack Mother    • Hypertension Mother    • Colon cancer Mother 53   • Stroke Mother    • Heart disease Father    • Heart disease Sister    • Lung cancer Maternal Uncle         50s   • Heart disease Maternal Uncle    • Stroke Maternal Grandfather    • Heart disease Maternal Grandfather    • Heart attack Other    • Heart disease Other    • Heart failure Other    • Hypertension Other    • Hyperlipidemia Other          Objective   Vital Signs:   /82   Pulse 62   Temp 98 °F (36.7 °C)   Resp 17   Ht 182.9 cm (72\")   Wt 108 kg (238 lb 12.8 oz)   SpO2 97%   BMI 32.39 kg/m²     Body mass index is 32.39 kg/m².    All labs, imaging, test results, and specialty provider notes reviewed with patient.       Physical Exam  Vitals reviewed.   Constitutional:       Appearance: Normal appearance.   Cardiovascular:      Rate and Rhythm: Normal rate and regular rhythm.      Pulses: Normal pulses.      Heart sounds: Normal heart sounds.   Pulmonary:      Effort: Pulmonary effort is normal.      Breath sounds: Normal breath sounds.   Neurological:      General: No focal deficit present.      Mental Status: He is alert and oriented to person, place, and time.         Assessment and Plan:  Diagnoses and all orders for this visit:    1. Fatigue, unspecified type (Primary)  -     Testosterone; Future  -     Cortisol; Future  -     Thyroid Antibodies; Future  -     TSH Rfx On Abnormal To Free T4; Future  -     Ferritin; Future  -     Iron Profile; Future  -     Vitamin B12; Future  -     Folate; Future  -     Vitamin D 25 Hydroxy; Future  -     EBV VCA / EA Antibody, IgG; Future  -     Arun-Barr Virus VCA, IgM; Future  -     Aldosterone; Future    2. Lightheadedness  -     Testosterone; Future  -     Cortisol; Future  -     Thyroid Antibodies; Future  -     Ferritin; Future  -     Iron Profile; Future  -     Vitamin B12; Future  -     Folate; Future  -     Vitamin D 25 Hydroxy; Future  -     " EBV VCA / EA Antibody, IgG; Future  -     Arun-Barr Virus VCA, IgM; Future  -     Aldosterone; Future    3. Dizziness  -     Testosterone; Future  -     Cortisol; Future  -     Thyroid Antibodies; Future  -     Ferritin; Future  -     Iron Profile; Future  -     Vitamin B12; Future  -     Folate; Future  -     Vitamin D 25 Hydroxy; Future  -     EBV VCA / EA Antibody, IgG; Future  -     Arun-Barr Virus VCA, IgM; Future  -     Aldosterone; Future    4. Brain fog  -     Testosterone; Future  -     Cortisol; Future  -     Thyroid Antibodies; Future  -     Aldosterone; Future    5. Routine lab draw  -     CBC Auto Differential; Future  -     Comprehensive Metabolic Panel; Future  -     Urinalysis With Culture If Indicated -; Future    6. Screening for lipid disorders  -     Lipid Panel; Future    7. Impaired fasting glucose  -     Microalbumin / Creatinine Urine Ratio - Urine, Clean Catch; Future  -     Hemoglobin A1c; Future    8. Irritable bowel syndrome, unspecified type  Comments:  Currently stable.  Does use Levsin as needed for acute moments.  Orders:  -     Celiac Panel Reflex To Titer; Future    9. Gastroesophageal reflux disease without esophagitis  Comments:  Stable at this time.  Orders:  -     Celiac Panel Reflex To Titer; Future    10. Hepatic steatosis  Comments:  Per GI, treatment plan per them.    11. PTSD (post-traumatic stress disorder)    12. Tinnitus of both ears  Comments:  Chronic from  injury.    13. Prolonged QT interval  Comments:  Per cardiology, currently stable.  Doing well on nadolol.    14. Brugada syndrome  Comments:  History of the gene, no current issues.  Has been worked up extensively, follows up with cardiac electro physicist.    15. History of Lyme disease    16. Chronic pain of both knees  Comments:  No acute issues at this time.    17. Chronic bilateral low back pain with bilateral sciatica  Comments:  No acute issues at this time.      We will work-up the  endocrine/hormone side of things, we will test testosterone.  We will also obtain other labs such as cortisol and aldosterone to further evaluate.  We will also repeat full panel labs, obtain thyroid antibodies to further evaluate as well.  Do wonder if this is hormone origin, if not, can consider sending to endocrinology/allergy for further testing.  We will also obtain a celiac panel.  Discussed treatment plan with patient, verbalized understanding.    Follow Up:  Return in about 3 months (around 9/16/2022).    Patient was given instructions and counseling regarding his condition or for health maintenance advice. Please see specific information pulled into the AVS if appropriate.

## 2022-06-17 ENCOUNTER — TELEPHONE (OUTPATIENT)
Dept: CARDIOLOGY | Facility: CLINIC | Age: 42
End: 2022-06-17

## 2022-06-17 LAB
ALBUMIN UR-MCNC: <1.2 MG/DL
CORTIS SERPL-MCNC: 9.35 MCG/DL
CREAT UR-MCNC: 83.6 MG/DL
IRON 24H UR-MRATE: 111 MCG/DL (ref 59–158)
IRON SATN MFR SERPL: 25 % (ref 20–50)
MICROALBUMIN/CREAT UR: NORMAL MG/G{CREAT}
TIBC SERPL-MCNC: 443 MCG/DL (ref 298–536)
TRANSFERRIN SERPL-MCNC: 297 MG/DL (ref 200–360)

## 2022-06-17 NOTE — TELEPHONE ENCOUNTER
----- Message from NICOLE Strickland sent at 6/17/2022 10:09 AM EDT -----  Lipids are still elevated, find out if he was able to take the niaspan that dr patten ordered for him on 6/1

## 2022-06-17 NOTE — TELEPHONE ENCOUNTER
Pt has been taking Niacin for one week. He knows he is to have labs redrawn in a month for our office.

## 2022-06-18 LAB
EBV EA-D IGG SER-ACNC: <9 U/ML (ref 0–8.9)
EBV VCA IGG SER IA-ACNC: 196 U/ML (ref 0–17.9)
EBV VCA IGM SER IA-ACNC: <36 U/ML (ref 0–35.9)

## 2022-06-24 RX ORDER — ICOSAPENT ETHYL 1000 MG/1
CAPSULE ORAL
Qty: 360 CAPSULE | Refills: 0 | Status: SHIPPED | OUTPATIENT
Start: 2022-06-24 | End: 2022-06-30

## 2022-06-27 DIAGNOSIS — R42 LIGHTHEADEDNESS: ICD-10-CM

## 2022-06-27 DIAGNOSIS — Z91.02 FOOD ADDITIVES ALLERGY STATUS: ICD-10-CM

## 2022-06-27 DIAGNOSIS — R53.83 FATIGUE, UNSPECIFIED TYPE: Primary | ICD-10-CM

## 2022-06-30 ENCOUNTER — TELEPHONE (OUTPATIENT)
Dept: CARDIOLOGY | Facility: CLINIC | Age: 42
End: 2022-06-30

## 2022-06-30 ENCOUNTER — OFFICE VISIT (OUTPATIENT)
Dept: CARDIOLOGY | Facility: CLINIC | Age: 42
End: 2022-06-30

## 2022-06-30 VITALS
DIASTOLIC BLOOD PRESSURE: 84 MMHG | HEART RATE: 72 BPM | WEIGHT: 236 LBS | HEIGHT: 72 IN | BODY MASS INDEX: 31.97 KG/M2 | OXYGEN SATURATION: 98 % | SYSTOLIC BLOOD PRESSURE: 124 MMHG

## 2022-06-30 DIAGNOSIS — I10 PRIMARY HYPERTENSION: ICD-10-CM

## 2022-06-30 DIAGNOSIS — I45.81 LONG Q-T SYNDROME: ICD-10-CM

## 2022-06-30 DIAGNOSIS — R55 VASOVAGAL SYNCOPE: ICD-10-CM

## 2022-06-30 DIAGNOSIS — I49.8 BRUGADA SYNDROME: ICD-10-CM

## 2022-06-30 DIAGNOSIS — R55 SYNCOPE AND COLLAPSE: Primary | ICD-10-CM

## 2022-06-30 DIAGNOSIS — I45.10 RBBB: ICD-10-CM

## 2022-06-30 PROCEDURE — 99214 OFFICE O/P EST MOD 30 MIN: CPT | Performed by: INTERNAL MEDICINE

## 2022-06-30 RX ORDER — ICOSAPENT ETHYL 1000 MG/1
2 CAPSULE ORAL 2 TIMES DAILY WITH MEALS
COMMUNITY
End: 2022-07-25 | Stop reason: SDUPTHER

## 2022-06-30 NOTE — TELEPHONE ENCOUNTER
----- Message from Timi Hernandez sent at 6/29/2022  7:46 PM EDT -----  Regarding: Appointment  My appointment was canceled a while ago that was on the 14 of July but it was never rescheduled. I was never reschedule.     Dontrell Hernandez  [phone]    Notes: my bpm has been has been in the low 40’s at times and Monday June 27th I passed out. Was very tired after word and very disorient went to bed woke and I was fine again.

## 2022-06-30 NOTE — PROGRESS NOTES
Timi Hernandez  1980  [phone]    06/30/2022    [facility]ARD[facility]     Referring Provider: No ref. provider found     Clemencia Salas, APRN  [id number] [phone]    Chief Complaint   Patient presents with   • Long QT Syndrome       Problem List:     1.  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 Uncle with reported long QT syndrome. Grandfather with sudden cardiac death at age 36   b. Echo 3/29/2021 ejection fraction 55%, no significant valvular heart issues.  c. Stress Test 3/29/2021 good exercise tolerance, and exercise EKG negative for ischemia, atypical chest pain and abdominal pain with exercise, no arrhythmias seen.  Study is consistent with no reversible ischemia at rest or with exercise.  This indicates a low probability of coronary artery disease in this individual  d. 3/22/2021 48-hour Holter monitor normal sinus rhythm average heart rate of 86 bpm, 8 episodes of PACs, one 5 beat run of supraventricular tachycardia in the SVT consistent with ectopic atrial tachycardia  e. 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 Cardiac MRI 10/15/2021: Normal LV systolic function EF 68%, no CMR evidence for scar or fibrosis, normal size RV with mildly reduced function of 46%  g. Event Monitor 11/5-11/19/2022: NSR HR  bpm, average HR 72. No VT. Less than 1% PACs, no PVCs  h.  EP study 2/3/2022 but no inducible ventricular tachycardia and negative procainamide challenge test.  Normal QT interval in baseline state with appropriate shortening during atrial pacing and isoproterenol.  Normal HV interval   i. Neg TTT 4/19/2022  2. RBBB  3. HTN  4. HLD   5. SANCHEZ   6. GERD      Allergies  Allergies   Allergen Reactions   • Penicillins Anaphylaxis   • Sulfamethoxazole-Trimethoprim Anaphylaxis and Rash     Other reaction(s): sore throat and ulcers   • Colchicine Rash     PT stated body was burning       Current  "Medications    Current Outpatient Medications:   •  aspirin 81 MG EC tablet, Take 81 mg by mouth Daily., Disp: , Rfl:   •  cetirizine (zyrTEC) 10 MG tablet, Zyrtec 10 mg oral tablet take 1 tablet (10 mg) by oral route once daily   Active, Disp: , Rfl:   •  choline fenofibrate (TRILIPIX) 135 MG capsule, Take 135 mg by mouth Daily., Disp: , Rfl:   •  clonazePAM (KlonoPIN) 1 MG tablet, Take 1 mg by mouth As Needed., Disp: , Rfl:   •  ELDERBERRY PO, Take 1 capsule by mouth 2 (Two) Times a Day., Disp: , Rfl:   •  hyoscyamine (LEVSIN) 0.125 MG SL tablet, PLACE 1 TABLET UNDER THE TONGUE EVERY 4-6 HOURS AS NEEDED FOR DIARRHEA/ABDOMINAL PAIN, Disp: 270 tablet, Rfl: 1  •  Icosapent Ethyl (VASCEPA PO), Take 2,000 mg by mouth 2 (Two) Times a Day., Disp: , Rfl:   •  icosapent ethyl (VASCEPA) 1 g capsule capsule, Take 2 g by mouth 2 (Two) Times a Day With Meals., Disp: , Rfl:   •  nadolol (Corgard) 20 MG tablet, Take 1 tablet by mouth Daily., Disp: 30 tablet, Rfl: 6  •  niacin (NIASPAN) 500 MG CR tablet, Take 1 tablet by mouth Every Night., Disp: 90 tablet, Rfl: 3    History of Present Illness     Pt presents for follow up of LQT/RBBB/LAFB/syncope . Since we last saw the pt, pt has had two episodes of syncope associated with LH and head rushing feeling followed by complete syncope. He saw a neurlogist that did a TTT that was negative. States HR in 40's on occasion from Apple watch. Denies any  SOB or CP. Denies any hospitalizations, ER visits, bleeding, or TIA/CVA symptoms. Overall feels ok. BP low at times     ROS:  General:  + fatigue, no weight gain or loss  Cardiovascular:  Denies CP, PND, syncope, near syncope, edema + palpitations.  Pulmonary:  Denies XIE, cough, or wheezing      Vitals:    06/30/22 1410   BP: 124/84   BP Location: Left arm   Patient Position: Sitting   Pulse: 72   SpO2: 98%   Weight: 107 kg (236 lb)   Height: 182.9 cm (72\")     Body mass index is 32.01 kg/m².  PE:  General: NAD  Neck: no JVD, no carotid " bruits, no TM  Heart RRR, NL S1, S2,  no rubs, murmurs  Lungs: CTA, no wheezes, rhonchi, or rales  Abd: soft, non-tender, NL BS  Ext: No musculoskeletal deformities, no edema, cyanosis, or clubbing  Psych: normal mood and affect    Diagnostic Data:      Procedures    1. Syncope and collapse    2. Vasovagal syncope    3. Long Q-T syndrome    4. Brugada syndrome    5. RBBB    6. Primary hypertension          Plan:    1.  Syncope: Etiology syncope may be multifactorial including vasovagal syncope although tilt able test was negative versus possible intermittent AV block in a patient with right bundle branch block/left posterior fascicular block.  Doubt malignant ventricular arrhythmias at this time.  Would like to proceed with plan for loop recorder insertion to define any arrhythmias that may be present during these episodes of syncope.  Unfortunately since his syncopal episodes are not often, external monitoring will be of little benefit.     2.  Mixed Long QT 3 Syndrome/Brugada Syndrome: Extensive work-up performed.  QT interval stable overall. Stay on naldolol 20 mg po daily    3.  Hypertension: Would not be too aggressive with blood pressure control as this may worsen his episodes of syncope.      F/up in 6 months

## 2022-07-06 ENCOUNTER — PATIENT MESSAGE (OUTPATIENT)
Dept: CARDIOLOGY | Facility: CLINIC | Age: 42
End: 2022-07-06

## 2022-07-06 ENCOUNTER — TELEPHONE (OUTPATIENT)
Dept: CARDIOLOGY | Facility: CLINIC | Age: 42
End: 2022-07-06

## 2022-07-07 PROBLEM — R55 SYNCOPE AND COLLAPSE: Status: ACTIVE | Noted: 2022-07-07

## 2022-07-12 ENCOUNTER — CLINICAL SUPPORT (OUTPATIENT)
Dept: FAMILY MEDICINE CLINIC | Facility: CLINIC | Age: 42
End: 2022-07-12

## 2022-07-12 DIAGNOSIS — Z23 NEED FOR TUBERCULOSIS VACCINATION: Primary | ICD-10-CM

## 2022-07-12 PROCEDURE — 86580 TB INTRADERMAL TEST: CPT

## 2022-07-14 ENCOUNTER — CLINICAL SUPPORT (OUTPATIENT)
Dept: FAMILY MEDICINE CLINIC | Facility: CLINIC | Age: 42
End: 2022-07-14

## 2022-07-14 DIAGNOSIS — Z11.1 VISIT FOR TB SKIN TEST: Primary | ICD-10-CM

## 2022-07-14 LAB
INDURATION: 0 MM (ref 0–10)
Lab: NORMAL
Lab: NORMAL
TB SKIN TEST: NEGATIVE

## 2022-07-14 PROCEDURE — 99211 OFF/OP EST MAY X REQ PHY/QHP: CPT

## 2022-07-18 ENCOUNTER — TELEPHONE (OUTPATIENT)
Dept: CARDIOLOGY | Facility: CLINIC | Age: 42
End: 2022-07-18

## 2022-07-18 ENCOUNTER — LAB (OUTPATIENT)
Dept: LAB | Facility: HOSPITAL | Age: 42
End: 2022-07-18

## 2022-07-18 DIAGNOSIS — E78.5 DYSLIPIDEMIA: ICD-10-CM

## 2022-07-18 DIAGNOSIS — E78.5 DYSLIPIDEMIA: Primary | ICD-10-CM

## 2022-07-18 LAB
CHOLEST SERPL-MCNC: 201 MG/DL (ref 0–200)
HDLC SERPL-MCNC: 26 MG/DL (ref 40–60)
LDLC SERPL CALC-MCNC: 116 MG/DL (ref 0–100)
LDLC/HDLC SERPL: 4.15 {RATIO}
TRIGL SERPL-MCNC: 336 MG/DL (ref 0–150)
VLDLC SERPL-MCNC: 59 MG/DL (ref 5–40)

## 2022-07-18 PROCEDURE — 36415 COLL VENOUS BLD VENIPUNCTURE: CPT

## 2022-07-18 PROCEDURE — 80061 LIPID PANEL: CPT

## 2022-07-18 NOTE — TELEPHONE ENCOUNTER
----- Message from Timi Hernandez sent at 7/18/2022  8:15 AM EDT -----  Regarding: Passed out again  Yesterday I passed out. Was out for about 20 secs.   I did do you a 5k that day and it happened like 2 hours after. I was just standing there an not moving around like to you. I am just telling you for your notes.     Dontrell Hernandez  [phone]

## 2022-07-18 NOTE — TELEPHONE ENCOUNTER
Bobbi Alonso, NICOLE  You 10 minutes ago (12:52 PM)         He is scheduled for a loop implant in August, lets move that up ASAP. Also lets forward the message to [facility] for his review as well.     Message text

## 2022-07-19 ENCOUNTER — TELEPHONE (OUTPATIENT)
Dept: CARDIOLOGY | Facility: CLINIC | Age: 42
End: 2022-07-19

## 2022-07-19 NOTE — TELEPHONE ENCOUNTER
----- Message from NICOLE Strickland sent at 7/19/2022  9:02 AM EDT -----  Notify pt lipid panel is improved but still elevated

## 2022-07-22 NOTE — TELEPHONE ENCOUNTER
I called patient and updated him on this, he voiced understanding. He will make a log of BP and HR.

## 2022-07-22 NOTE — TELEPHONE ENCOUNTER
Lang Sorensen MD  You 17 minutes ago (8:17 AM)       Please call the patient back and stop his nadolol.     Message text

## 2022-07-25 RX ORDER — ICOSAPENT ETHYL 1000 MG/1
2 CAPSULE ORAL 2 TIMES DAILY WITH MEALS
Qty: 120 CAPSULE | Refills: 3 | Status: SHIPPED | OUTPATIENT
Start: 2022-07-25 | End: 2022-10-12

## 2022-07-29 ENCOUNTER — HOSPITAL ENCOUNTER (OUTPATIENT)
Dept: CARDIOLOGY | Facility: HOSPITAL | Age: 42
Setting detail: HOSPITAL OUTPATIENT SURGERY
Discharge: HOME | End: 2022-07-29
Attending: INTERNAL MEDICINE | Admitting: INTERNAL MEDICINE

## 2022-07-29 VITALS
TEMPERATURE: 97.8 F | DIASTOLIC BLOOD PRESSURE: 97 MMHG | SYSTOLIC BLOOD PRESSURE: 146 MMHG | HEART RATE: 73 BPM | WEIGHT: 238.54 LBS | HEIGHT: 72 IN | OXYGEN SATURATION: 95 % | BODY MASS INDEX: 32.31 KG/M2

## 2022-07-29 DIAGNOSIS — I45.10 RBBB: ICD-10-CM

## 2022-07-29 DIAGNOSIS — R55 VASOVAGAL SYNCOPE: ICD-10-CM

## 2022-07-29 DIAGNOSIS — I49.8 BRUGADA SYNDROME: ICD-10-CM

## 2022-07-29 DIAGNOSIS — I45.81 LONG Q-T SYNDROME: ICD-10-CM

## 2022-07-29 LAB
MAXIMAL PREDICTED HEART RATE: 179 BPM
STRESS TARGET HR: 152 BPM

## 2022-07-29 PROCEDURE — 25010000002 MIDAZOLAM PER 1 MG: Performed by: INTERNAL MEDICINE

## 2022-07-29 PROCEDURE — C1764 EVENT RECORDER, CARDIAC: HCPCS

## 2022-07-29 PROCEDURE — 33285 INSJ SUBQ CAR RHYTHM MNTR: CPT | Performed by: INTERNAL MEDICINE

## 2022-07-29 PROCEDURE — 33285 INSJ SUBQ CAR RHYTHM MNTR: CPT

## 2022-07-29 RX ORDER — MIDAZOLAM HYDROCHLORIDE 1 MG/ML
INJECTION INTRAMUSCULAR; INTRAVENOUS
Status: COMPLETED | OUTPATIENT
Start: 2022-07-29 | End: 2022-07-29

## 2022-07-29 RX ORDER — LIDOCAINE HYDROCHLORIDE 20 MG/ML
INJECTION, SOLUTION INTRAVENOUS
Status: COMPLETED | OUTPATIENT
Start: 2022-07-29 | End: 2022-07-29

## 2022-07-29 RX ORDER — FLUMAZENIL 0.1 MG/ML
INJECTION INTRAVENOUS
Status: DISCONTINUED
Start: 2022-07-29 | End: 2022-07-29 | Stop reason: WASHOUT

## 2022-07-29 RX ORDER — MIDAZOLAM HYDROCHLORIDE 1 MG/ML
INJECTION INTRAMUSCULAR; INTRAVENOUS
Status: DISCONTINUED
Start: 2022-07-29 | End: 2022-07-29 | Stop reason: HOSPADM

## 2022-07-29 RX ADMIN — Medication 20 MG: at 11:48

## 2022-07-29 RX ADMIN — MIDAZOLAM 2 MG: 1 INJECTION INTRAMUSCULAR; INTRAVENOUS at 11:51

## 2022-07-29 RX ADMIN — Medication 20 MG: at 11:54

## 2022-07-29 RX ADMIN — Medication 20 MG: at 11:50

## 2022-08-01 ENCOUNTER — TELEPHONE (OUTPATIENT)
Dept: CARDIOLOGY | Facility: CLINIC | Age: 42
End: 2022-08-01

## 2022-08-01 NOTE — TELEPHONE ENCOUNTER
Patient called and stated that he has been sweating which is causing the Tegaderm to come off, he reinforced it. I advised him that if it does come off to either contact our office and we can apply a new one or to contact his PCP to see if they would assist with placing a new one. He voiced understanding. I also stated that the procedure sit was sore. I advised him he can take Tylenol he voiced understanding.

## 2022-08-09 ENCOUNTER — OFFICE VISIT (OUTPATIENT)
Dept: CARDIOLOGY | Facility: CLINIC | Age: 42
End: 2022-08-09

## 2022-08-09 VITALS — WEIGHT: 238 LBS | BODY MASS INDEX: 32.23 KG/M2 | HEIGHT: 72 IN

## 2022-08-09 DIAGNOSIS — R55 SYNCOPE AND COLLAPSE: Primary | ICD-10-CM

## 2022-08-09 DIAGNOSIS — R41.89 BRAIN FOG: Primary | ICD-10-CM

## 2022-08-09 DIAGNOSIS — R53.83 FATIGUE, UNSPECIFIED TYPE: ICD-10-CM

## 2022-08-09 DIAGNOSIS — I45.81 LONG Q-T SYNDROME: ICD-10-CM

## 2022-08-09 DIAGNOSIS — R42 DIZZINESS: ICD-10-CM

## 2022-08-09 PROCEDURE — 99024 POSTOP FOLLOW-UP VISIT: CPT | Performed by: INTERNAL MEDICINE

## 2022-08-09 NOTE — PROGRESS NOTES
2022    Timi Pritchard Mary, : 1980    Lang Sorensen MD        Patient has fever: [] Temperature if indicated:     Wound Location: Mid Sternum    Dressing Removed [x]        Old Dressing Appearance:  Clean, dry [x]                 Old, bloody drainage []                            Moist, serous drainage []                Moist, thick yellow/green drainage []       Wound Appearance: Redness []                  Drainage []                  Culture obtained []        Color: N/A     Consistency: n/a     Amount: none         Gloves used, wound cleansed with sterile 4x4 and peroxide [x]       MD notified [] MD orders:   Antibiotic started []  If checked, type   Other:     Appointment for follow-up scheduled for 3 months post procedure []    Future Appointments   Date Time Provider Department Center   2022  9:30 AM Abram Sawant MD [facility]   2022  7:30 AM Clemencia Salas APRANDRES [facility]   10/3/2022  9:30 AM Alla Kaiser APRANDRES [facility]   2022 12:45 PM [facility]           Valeriano Bravo MA, 22      MD Signature:______________________________ Completed By/Date:

## 2022-08-16 DIAGNOSIS — R41.89 BRAIN FOG: Primary | ICD-10-CM

## 2022-08-16 DIAGNOSIS — R53.83 FATIGUE, UNSPECIFIED TYPE: ICD-10-CM

## 2022-08-16 DIAGNOSIS — R42 DIZZINESS: ICD-10-CM

## 2022-08-16 DIAGNOSIS — R42 LIGHTHEADEDNESS: ICD-10-CM

## 2022-08-19 ENCOUNTER — TELEPHONE (OUTPATIENT)
Dept: FAMILY MEDICINE CLINIC | Facility: CLINIC | Age: 42
End: 2022-08-19

## 2022-08-19 NOTE — TELEPHONE ENCOUNTER
Patient is requesting to get a note from Clemencia excusing him from work for 8/19/22. He is requesting a note as of a mental health day.     Advised patient provider is not in office and wont be back until Monday.

## 2022-08-23 NOTE — TELEPHONE ENCOUNTER
Sent patient a my chart message informing him that we were unable to give a excuse that day because we did not see him in office.

## 2022-08-25 ENCOUNTER — LAB (OUTPATIENT)
Dept: LAB | Facility: HOSPITAL | Age: 42
End: 2022-08-25

## 2022-08-25 DIAGNOSIS — A69.20 LYME DISEASE: Primary | ICD-10-CM

## 2022-08-25 DIAGNOSIS — E78.5 DYSLIPIDEMIA: ICD-10-CM

## 2022-08-25 LAB
ALBUMIN SERPL-MCNC: 4.8 G/DL (ref 3.5–5.2)
ALP SERPL-CCNC: 36 U/L (ref 39–117)
ALT SERPL W P-5'-P-CCNC: 122 U/L (ref 1–41)
ANION GAP SERPL CALCULATED.3IONS-SCNC: 12.7 MMOL/L (ref 5–15)
AST SERPL-CCNC: 62 U/L (ref 1–40)
BILIRUB CONJ SERPL-MCNC: <0.2 MG/DL (ref 0–0.3)
BILIRUB INDIRECT SERPL-MCNC: ABNORMAL MG/DL
BILIRUB SERPL-MCNC: 0.5 MG/DL (ref 0–1.2)
BUN SERPL-MCNC: 18 MG/DL (ref 6–20)
BUN/CREAT SERPL: 18.8 (ref 7–25)
CALCIUM SPEC-SCNC: 9.6 MG/DL (ref 8.6–10.5)
CHLORIDE SERPL-SCNC: 99 MMOL/L (ref 98–107)
CHOLEST SERPL-MCNC: 255 MG/DL (ref 0–200)
CO2 SERPL-SCNC: 25.3 MMOL/L (ref 22–29)
CREAT SERPL-MCNC: 0.96 MG/DL (ref 0.76–1.27)
EGFRCR SERPLBLD CKD-EPI 2021: 101.2 ML/MIN/1.73
GLUCOSE SERPL-MCNC: 95 MG/DL (ref 65–99)
HDLC SERPL-MCNC: 30 MG/DL (ref 40–60)
LDLC SERPL CALC-MCNC: 151 MG/DL (ref 0–100)
LDLC/HDLC SERPL: 4.87 {RATIO}
POTASSIUM SERPL-SCNC: 4.4 MMOL/L (ref 3.5–5.2)
PROT SERPL-MCNC: 7.1 G/DL (ref 6–8.5)
SODIUM SERPL-SCNC: 137 MMOL/L (ref 136–145)
TRIGL SERPL-MCNC: 395 MG/DL (ref 0–150)
VLDLC SERPL-MCNC: 74 MG/DL (ref 5–40)

## 2022-08-25 PROCEDURE — 36415 COLL VENOUS BLD VENIPUNCTURE: CPT

## 2022-08-25 PROCEDURE — 80048 BASIC METABOLIC PNL TOTAL CA: CPT

## 2022-08-25 PROCEDURE — 80076 HEPATIC FUNCTION PANEL: CPT

## 2022-08-25 PROCEDURE — 80061 LIPID PANEL: CPT

## 2022-08-31 ENCOUNTER — LAB (OUTPATIENT)
Dept: LAB | Facility: HOSPITAL | Age: 42
End: 2022-08-31

## 2022-08-31 DIAGNOSIS — R73.01 IMPAIRED FASTING GLUCOSE: ICD-10-CM

## 2022-08-31 DIAGNOSIS — Z13.220 SCREENING FOR LIPID DISORDERS: ICD-10-CM

## 2022-08-31 DIAGNOSIS — Z01.89 ROUTINE LAB DRAW: ICD-10-CM

## 2022-08-31 DIAGNOSIS — K58.9 IRRITABLE BOWEL SYNDROME, UNSPECIFIED TYPE: ICD-10-CM

## 2022-08-31 DIAGNOSIS — K21.9 GASTROESOPHAGEAL REFLUX DISEASE WITHOUT ESOPHAGITIS: ICD-10-CM

## 2022-08-31 DIAGNOSIS — R42 DIZZINESS: ICD-10-CM

## 2022-08-31 DIAGNOSIS — R41.89 BRAIN FOG: ICD-10-CM

## 2022-08-31 DIAGNOSIS — E78.5 DYSLIPIDEMIA: ICD-10-CM

## 2022-08-31 DIAGNOSIS — I10 ESSENTIAL HYPERTENSION: ICD-10-CM

## 2022-08-31 DIAGNOSIS — R53.83 FATIGUE, UNSPECIFIED TYPE: ICD-10-CM

## 2022-08-31 DIAGNOSIS — R42 LIGHTHEADEDNESS: ICD-10-CM

## 2022-08-31 PROCEDURE — 36415 COLL VENOUS BLD VENIPUNCTURE: CPT

## 2022-08-31 PROCEDURE — 86258 DGP ANTIBODY EACH IG CLASS: CPT

## 2022-08-31 PROCEDURE — 86376 MICROSOMAL ANTIBODY EACH: CPT

## 2022-08-31 PROCEDURE — 82784 ASSAY IGA/IGD/IGG/IGM EACH: CPT

## 2022-08-31 PROCEDURE — 86364 TISS TRNSGLTMNASE EA IG CLAS: CPT

## 2022-08-31 PROCEDURE — 82088 ASSAY OF ALDOSTERONE: CPT

## 2022-08-31 PROCEDURE — 86800 THYROGLOBULIN ANTIBODY: CPT

## 2022-09-01 ENCOUNTER — OFFICE VISIT (OUTPATIENT)
Dept: CARDIOLOGY | Facility: CLINIC | Age: 42
End: 2022-09-01

## 2022-09-01 VITALS
DIASTOLIC BLOOD PRESSURE: 86 MMHG | SYSTOLIC BLOOD PRESSURE: 138 MMHG | HEART RATE: 101 BPM | WEIGHT: 240.6 LBS | BODY MASS INDEX: 32.59 KG/M2 | HEIGHT: 72 IN

## 2022-09-01 DIAGNOSIS — I49.8 BRUGADA SYNDROME: ICD-10-CM

## 2022-09-01 DIAGNOSIS — I10 ESSENTIAL HYPERTENSION: ICD-10-CM

## 2022-09-01 DIAGNOSIS — I45.81 LONG Q-T SYNDROME: ICD-10-CM

## 2022-09-01 DIAGNOSIS — E78.5 DYSLIPIDEMIA: ICD-10-CM

## 2022-09-01 DIAGNOSIS — I45.3: ICD-10-CM

## 2022-09-01 DIAGNOSIS — R55 SYNCOPE AND COLLAPSE: Primary | ICD-10-CM

## 2022-09-01 PROCEDURE — 99214 OFFICE O/P EST MOD 30 MIN: CPT | Performed by: INTERNAL MEDICINE

## 2022-09-01 RX ORDER — NIACIN 1000 MG/1
1000 TABLET, EXTENDED RELEASE ORAL NIGHTLY
Qty: 90 TABLET | Refills: 3 | Status: SHIPPED | OUTPATIENT
Start: 2022-09-01 | End: 2022-11-22 | Stop reason: SDUPTHER

## 2022-09-01 RX ORDER — NADOLOL 20 MG/1
20 TABLET ORAL DAILY
COMMUNITY
End: 2022-09-26 | Stop reason: SDUPTHER

## 2022-09-01 NOTE — PROGRESS NOTES
Chief Complaint  Hypertension, RBBB, and LABS (Follow up)    Subjective    Patient has not had a syncopal episode since Lipitor had been implanted he denies any new complaints or problems.  He has been taking his medications other than his Vascepa which have been changed to a generic the patient was not a to tolerate due to side effects and has now been switched back.    Past Medical History:   Diagnosis Date   • ADHD (attention deficit hyperactivity disorder) 1992   • Anxiety    • Arthritis    • Brugada syndrome 11/16/2021   • Chronic allergic rhinitis    • Essential hypertension    • Family history of colon cancer in mother    • Fatty liver    • GERD (gastroesophageal reflux disease)    • Gout    • Hepatomegaly    • Hyperlipidemia    • IBS (irritable bowel syndrome)    • Kidney stones    • Lyme disease 2021   • Right bundle branch block (RBBB), 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)    • Syncope          Current Outpatient Medications:   •  aspirin 81 MG EC tablet, Take 81 mg by mouth Daily., Disp: , Rfl:   •  cetirizine (zyrTEC) 10 MG tablet, Zyrtec 10 mg oral tablet take 1 tablet (10 mg) by oral route once daily   Active, Disp: , Rfl:   •  choline fenofibrate (TRILIPIX) 135 MG capsule, Take 135 mg by mouth Daily., Disp: , Rfl:   •  clonazePAM (KlonoPIN) 1 MG tablet, Take 1 mg by mouth As Needed., Disp: , Rfl:   •  ELDERBERRY PO, Take 1 capsule by mouth 2 (Two) Times a Day., Disp: , Rfl:   •  hyoscyamine (LEVSIN) 0.125 MG SL tablet, PLACE 1 TABLET UNDER THE TONGUE EVERY 4-6 HOURS AS NEEDED FOR DIARRHEA/ABDOMINAL PAIN, Disp: 270 tablet, Rfl: 1  •  icosapent ethyl (VASCEPA) 1 g capsule capsule, Take 2 g by mouth 2 (Two) Times a Day With Meals., Disp: 120 capsule, Rfl: 3  •  nadolol (CORGARD) 20 MG tablet, Take 20 mg by mouth Daily., Disp: , Rfl:   •  niacin (NIASPAN) 1000 MG CR tablet, Take 1 tablet by mouth Every Night., Disp: 90 tablet, Rfl: 3    Medications Discontinued During  "This Encounter   Medication Reason   • niacin (NIASPAN) 500 MG CR tablet      Allergies   Allergen Reactions   • Penicillins Anaphylaxis   • Sulfamethoxazole-Trimethoprim Anaphylaxis and Rash     Other reaction(s): sore throat and ulcers   • Colchicine Rash     PT stated body was burning        Social History     Tobacco Use   • Smoking status: Never Smoker   • Smokeless tobacco: Never Used   • Tobacco comment: second hand smoke exposure-never   Vaping Use   • Vaping Use: Never used   Substance Use Topics   • Alcohol use: Not Currently   • Drug use: Never       Family History   Problem Relation Age of Onset   • Heart failure Mother         Heart attack in her early 50s   • Hyperlipidemia Mother    • Heart attack Mother    • Hypertension Mother    • Colon cancer Mother 53   • Stroke Mother    • Heart disease Father    • Heart disease Sister    • Lung cancer Maternal Uncle         50s   • Heart disease Maternal Uncle    • Stroke Maternal Grandfather    • Heart disease Maternal Grandfather    • Heart attack Other    • Heart disease Other    • Heart failure Other    • Hypertension Other    • Hyperlipidemia Other         Objective     /86   Pulse 101   Ht 182.9 cm (72\")   Wt 109 kg (240 lb 9.6 oz)   BMI 32.63 kg/m²       Physical Exam    General Appearance:   · no acute distress  · Alert and oriented x3  HENT:   · lips not cyanotic  · Atraumatic  Neck:  · No jvd   · supple  Respiratory:  · no respiratory distress  · normal breath sounds  · no rales  Cardiovascular:  · Regular rate and rhythm  · no S3, no S4   · no murmur  · no rub  Extremities  · No cyanosis  · lower extremity edema: none    Skin:   · warm, dry  · No rashes      Result Review :     No results found for: PROBNP  CMP    CMP 5/20/22 6/16/22 8/25/22 8/25/22      1220 1220   Glucose  91  95   BUN  16  18   Creatinine  0.96  0.96   Sodium  138  137   Potassium  4.3  4.4   Chloride  102  99   Calcium  9.4  9.6   Albumin 4.80 4.80 4.80    Total " Bilirubin 0.6 0.6 0.5    Alkaline Phosphatase 34 (A) 35 (A) 36 (A)    AST (SGOT) 62 (A) 56 (A) 62 (A)    ALT (SGPT) 111 (A) 101 (A) 122 (A)    (A) Abnormal value            CBC w/diff    CBC w/Diff 3/1/22 5/20/22 6/16/22   WBC 6.27 6.51 6.77   RBC 5.12 4.87 4.53   Hemoglobin 15.0 14.2 13.6   Hematocrit 43.9 42.1 38.1   MCV 85.7 86.4 84.1   MCH 29.3 29.2 30.0   MCHC 34.2 33.7 35.7   RDW 14.1 13.3 13.4   Platelets 201 202 181   Neutrophil Rel % 41.3 (A)  42.7   Immature Granulocyte Rel % 0.5  0.6 (A)   Lymphocyte Rel % 49.3 (A)  48.0 (A)   Monocyte Rel % 6.5  6.5   Eosinophil Rel % 1.9  1.8   Basophil Rel % 0.5  0.4   (A) Abnormal value             Lab Results   Component Value Date    TSH 1.700 06/16/2022      Lab Results   Component Value Date    FREET4 1.20 03/01/2022      No results found for: DDIMERQUANT  No results found for: MG   No results found for: DIGOXIN   No results found for: TROPONINT        Lipid Panel    Lipid Panel 6/16/22 7/18/22 8/25/22   Total Cholesterol 228 (A) 201 (A) 255 (A)   Triglycerides 345 (A) 336 (A) 395 (A)   HDL Cholesterol 30 (A) 26 (A) 30 (A)   VLDL Cholesterol 63 (A) 59 (A) 74 (A)   LDL Cholesterol  135 (A) 116 (A) 151 (A)   LDL/HDL Ratio 4.30 4.15 4.87   (A) Abnormal value            No results found for: POCTROP                   Diagnoses and all orders for this visit:    1. Syncope and collapse (Primary)  Assessment & Plan:  Possibly vasovagal in nature versus intermittent AV block or less likely ventricular tachycardic related patient now has a loop recorder no recorded events so far      2. Dyslipidemia  Assessment & Plan:  Patient is tolerating the Niaspan well triglycerides still above 300 counseled again on diet and exercise.  We will titrate up on the Niaspan 2000 and counseled all on side effects as well as take an aspirin prior to to help with minimization of flushing will repeat lipids LFTs in 3 months    Orders:  -     Hepatic Function Panel; Future  -     Lipid Panel;  Future    3. Long Q-T syndrome  Assessment & Plan:  Patient is on nadolol 20 mg daily      4. Right bundle branch block (RBBB), 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)    5. Brugada syndrome    6. Essential hypertension    Other orders  -     niacin (NIASPAN) 1000 MG CR tablet; Take 1 tablet by mouth Every Night.  Dispense: 90 tablet; Refill: 3          Follow Up     Return in about 6 months (around 3/1/2023).          Patient was given instructions and counseling regarding his condition or for health maintenance advice. Please see specific information pulled into the AVS if appropriate.

## 2022-09-01 NOTE — ASSESSMENT & PLAN NOTE
Patient is tolerating the Niaspan well triglycerides still above 300 counseled again on diet and exercise.  We will titrate up on the Niaspan 2000 and counseled all on side effects as well as take an aspirin prior to to help with minimization of flushing will repeat lipids LFTs in 3 months

## 2022-09-01 NOTE — ASSESSMENT & PLAN NOTE
Possibly vasovagal in nature versus intermittent AV block or less likely ventricular tachycardic related patient now has a loop recorder no recorded events so far

## 2022-09-02 LAB
GLIADIN PEPTIDE IGA SER-ACNC: 10 UNITS (ref 0–19)
IGA SERPL-MCNC: 276 MG/DL (ref 90–386)
THYROGLOB AB SERPL-ACNC: <1 IU/ML (ref 0–0.9)
THYROPEROXIDASE AB SERPL-ACNC: <8 IU/ML (ref 0–34)
TTG IGA SER-ACNC: <2 U/ML (ref 0–3)

## 2022-09-06 ENCOUNTER — TELEPHONE (OUTPATIENT)
Dept: CARDIOLOGY | Facility: CLINIC | Age: 42
End: 2022-09-06

## 2022-09-06 ENCOUNTER — PATIENT MESSAGE (OUTPATIENT)
Dept: CARDIOLOGY | Facility: CLINIC | Age: 42
End: 2022-09-06

## 2022-09-06 DIAGNOSIS — I45.81 LONG Q-T SYNDROME: Primary | ICD-10-CM

## 2022-09-06 RX ORDER — FENOFIBRIC ACID 135 MG/1
CAPSULE, DELAYED RELEASE ORAL
Qty: 90 CAPSULE | Refills: 3 | Status: SHIPPED | OUTPATIENT
Start: 2022-09-06

## 2022-09-06 NOTE — TELEPHONE ENCOUNTER
----- Message from Timi Hernandez sent at 9/6/2022  2:59 PM EDT -----  Regarding: Cymbalta  Do you have an issue if I go back on Cymbalta it seems to be the only anti-depressant that’s works well for me.     I checked the medicine website you guys gave me and it seems that Cymbalta has no effect on QT whatsoever.     If this is correct I would like your permission to allow me to go back on the Cymbalta.    Dontrell Hernandez  [phone]

## 2022-09-07 LAB — ALDOST SERPL-MCNC: NORMAL NG/DL

## 2022-09-10 ENCOUNTER — PATIENT MESSAGE (OUTPATIENT)
Dept: GASTROENTEROLOGY | Facility: CLINIC | Age: 42
End: 2022-09-10

## 2022-09-11 PROCEDURE — G2066 INTER DEVC REMOTE 30D: HCPCS | Performed by: INTERNAL MEDICINE

## 2022-09-11 PROCEDURE — 93298 REM INTERROG DEV EVAL SCRMS: CPT | Performed by: INTERNAL MEDICINE

## 2022-09-12 NOTE — TELEPHONE ENCOUNTER
From: Timi Hernandez  To: NICOLE Estrada  Sent: 9/10/2022 6:37 PM EDT  Subject: Fatty liver disease    Am I due for a liver scan soon?

## 2022-09-13 ENCOUNTER — OFFICE VISIT (OUTPATIENT)
Dept: FAMILY MEDICINE CLINIC | Facility: CLINIC | Age: 42
End: 2022-09-13

## 2022-09-13 VITALS
RESPIRATION RATE: 18 BRPM | DIASTOLIC BLOOD PRESSURE: 80 MMHG | WEIGHT: 239.1 LBS | BODY MASS INDEX: 32.38 KG/M2 | OXYGEN SATURATION: 97 % | HEIGHT: 72 IN | SYSTOLIC BLOOD PRESSURE: 114 MMHG | TEMPERATURE: 98 F | HEART RATE: 45 BPM

## 2022-09-13 DIAGNOSIS — I49.8 BRUGADA SYNDROME: ICD-10-CM

## 2022-09-13 DIAGNOSIS — Z86.19 HISTORY OF LYME DISEASE: ICD-10-CM

## 2022-09-13 DIAGNOSIS — E78.2 MIXED HYPERLIPIDEMIA: Primary | ICD-10-CM

## 2022-09-13 DIAGNOSIS — R53.83 FATIGUE, UNSPECIFIED TYPE: ICD-10-CM

## 2022-09-13 DIAGNOSIS — J30.89 NON-SEASONAL ALLERGIC RHINITIS, UNSPECIFIED TRIGGER: ICD-10-CM

## 2022-09-13 DIAGNOSIS — R41.89 BRAIN FOG: ICD-10-CM

## 2022-09-13 DIAGNOSIS — K76.0 HEPATIC STEATOSIS: ICD-10-CM

## 2022-09-13 PROCEDURE — 99214 OFFICE O/P EST MOD 30 MIN: CPT

## 2022-09-13 RX ORDER — DULOXETIN HYDROCHLORIDE 30 MG/1
30 CAPSULE, DELAYED RELEASE ORAL
COMMUNITY
Start: 2022-09-08 | End: 2022-11-01

## 2022-09-13 NOTE — ASSESSMENT & PLAN NOTE
Continue with diet and exercise change, continue current medication regimen per cardiology as well.

## 2022-09-13 NOTE — ASSESSMENT & PLAN NOTE
Continue to see allergist, patient has upcoming injections, hoping this is going to give him relief as well as help with his fatigue and brain fog.

## 2022-09-13 NOTE — ASSESSMENT & PLAN NOTE
Continue to follow-up closely with GI, continue current treatment plan per them.  Continue to increase exercise and watch diet.

## 2022-09-13 NOTE — PROGRESS NOTES
Timi Hernandez presents to [facility] who presents to the clinic for 3-month follow-up.      History of Present Illness  This is a 42-year-old male who presents to the clinic for 3-month follow-up.    Fatigue: Patient states that he did go see an allergist, was told that he is allergic to almost everything outside, cats, dogs and a lot of other things as well.  He told him that his fatigue may be from his body constantly fighting off the allergens that he aches exposed to on a daily basis.  He does have upcoming allergy injections that are planned, and is hoping that this is going to give him some relief and forms of the fatigue.  Does also have an upcoming appointment with infectious disease, hoping to get some more answers from the Lyme disease, and hopefully figure out what is causing the brain fog and fatigue that he is experienced for so long.    Hyperlipidemia: Did recently see cardiology in regards to this, they did increase his niacin to 1000 mg at night.  Patient states that he is done okay, does have at times an issue with pretty severe itching that will occur for about 5 minutes, but then will go back down.  States that he is trying to eat better and increase exercise, hoping this is good to help with his cholesterol levels as well.    Hepatic steatosis: Does follow-up with GI soon, last blood work does show that his levels are slightly increased, he is due for his repeat scan that he gets every so often.  Not having any issues, also is hoping that the increased exercise and diet adjustments will make a big difference as well.    Did recently start on Cymbalta as well for some depression and other symptoms he was having, states that it has made somewhat of a difference, but he has noticed that his heart rate has been dropping.  Does state that he had to have approval from his cardiologist before starting the Cymbalta via his psychiatrist, and thus he did get the  "approval, but he also has a loop recorder in as well.  Does follow-up with cardiology soon, was also going to get a EEG after he was started on the Cymbalta as well.    Patient has no other acute complaints at this time.  Is up-to-date on all preventative screenings.    The following portions of the patient's history were personally reviewed and updated as appropriate: allergies, current medications, past medical history, past surgical history, past family history, and past social history.       Objective   Vital Signs:   /80   Pulse (!) 45   Temp 98 °F (36.7 °C)   Resp 18   Ht 182.9 cm (72\")   Wt 108 kg (239 lb 1.6 oz)   SpO2 97%   BMI 32.43 kg/m²     Body mass index is 32.43 kg/m².    All labs, imaging, test results, and specialty provider notes reviewed with patient.     Physical Exam  Vitals reviewed.   Constitutional:       Appearance: Normal appearance.   Cardiovascular:      Rate and Rhythm: Normal rate and regular rhythm.      Pulses: Normal pulses.      Heart sounds: Normal heart sounds.   Pulmonary:      Effort: Pulmonary effort is normal.      Breath sounds: Normal breath sounds.   Neurological:      General: No focal deficit present.      Mental Status: He is alert and oriented to person, place, and time.          Assessment and Plan:  Diagnoses and all orders for this visit:    1. Mixed hyperlipidemia (Primary)  Assessment & Plan:  Continue with diet and exercise change, continue current medication regimen per cardiology as well.      2. Hepatic steatosis  Assessment & Plan:  Continue to follow-up closely with GI, continue current treatment plan per them.  Continue to increase exercise and watch diet.      3. Non-seasonal allergic rhinitis, unspecified trigger  Assessment & Plan:  Continue to see allergist, patient has upcoming injections, hoping this is going to give him relief as well as help with his fatigue and brain fog.      4. Brugada syndrome  Assessment & Plan:  Continue to " follow-up closely with cardiology, treatment plan per them.      5. History of Lyme disease    6. Fatigue, unspecified type    7. Brain fog        Follow Up:  Return in about 6 months (around 3/13/2023).    Patient was given instructions and counseling regarding his condition or for health maintenance advice. Please see specific information pulled into the AVS if appropriate.

## 2022-09-14 ENCOUNTER — TELEPHONE (OUTPATIENT)
Dept: CARDIOLOGY | Facility: HOSPITAL | Age: 42
End: 2022-09-14

## 2022-09-14 NOTE — TELEPHONE ENCOUNTER
I called Dr. Sawant's office and they are able to see the EKG order. I called patient and update him on this. I also advised him that he would receive a call back if any changes needed to be made in regards to his symptoms and low HR. He voiced understanding.

## 2022-09-14 NOTE — TELEPHONE ENCOUNTER
I called and spoke with Mr Hernandez regarding his sinus bradycardia.  He denied symptoms of chest pain, SOA or edema.  He states he feels his heart is racing on intermittent occasions but they are brief/  Compliant with medications

## 2022-09-15 ENCOUNTER — TELEPHONE (OUTPATIENT)
Dept: CARDIOLOGY | Facility: CLINIC | Age: 42
End: 2022-09-15

## 2022-09-15 ENCOUNTER — CLINICAL SUPPORT (OUTPATIENT)
Dept: CARDIOLOGY | Facility: CLINIC | Age: 42
End: 2022-09-15

## 2022-09-15 DIAGNOSIS — I45.81 LONG Q-T SYNDROME: Primary | ICD-10-CM

## 2022-09-15 PROCEDURE — 93000 ELECTROCARDIOGRAM COMPLETE: CPT | Performed by: NURSE PRACTITIONER

## 2022-09-15 NOTE — TELEPHONE ENCOUNTER
----- Message from NICOLE Strickland sent at 9/15/2022  3:07 PM EDT -----  Please let patient know that we shared his EKG with Dr Sorensen. Both he and Dr Sawant want the EKG to be repeated in 2 weeks to closely monitor the QT measurement. For now, he can continue current medications.

## 2022-09-15 NOTE — TELEPHONE ENCOUNTER
Called Mr Hernandez in regards to symptom transmission that he sent on 9/14/22.  No arrhythmia noted.  Spoke to him and made him aware.

## 2022-09-21 RX ORDER — NADOLOL 20 MG/1
TABLET ORAL
Qty: 30 TABLET | OUTPATIENT
Start: 2022-09-21

## 2022-09-23 ENCOUNTER — TELEPHONE (OUTPATIENT)
Dept: CARDIOLOGY | Facility: CLINIC | Age: 42
End: 2022-09-23

## 2022-09-23 DIAGNOSIS — I45.81 LONG Q-T SYNDROME: Primary | ICD-10-CM

## 2022-09-23 NOTE — TELEPHONE ENCOUNTER
----- Message from Timi Hernandez sent at 9/22/2022  5:30 PM EDT -----  Regarding: Medication switch  Cymbalta hasn’t been working. What are your thoughts on lexapro? I can’t remember the website you all wanted me to use to see if I should take medication with the prolong Qt type 3?    Dontrell Hernandez

## 2022-09-26 RX ORDER — NADOLOL 20 MG/1
20 TABLET ORAL DAILY
Qty: 90 TABLET | Refills: 3 | Status: SHIPPED | OUTPATIENT
Start: 2022-09-26 | End: 2023-03-14

## 2022-09-26 NOTE — PROGRESS NOTES
Procedure     ECG 12 Lead    Date/Time: 9/15/2022 9:06 AM  Performed by: Kelsey Lauren APRN  Authorized by: Abram Sawant MD   Comparison: compared with previous ECG   Rhythm: sinus bradycardia  Rate: bradycardic  BPM: 41  Conduction: right bundle branch block  ST Segments: ST segments normal  T Waves: T waves normal  Other: no other findings    Clinical impression: abnormal EKG

## 2022-10-06 ENCOUNTER — TELEPHONE (OUTPATIENT)
Dept: CARDIOLOGY | Facility: CLINIC | Age: 42
End: 2022-10-06

## 2022-10-06 NOTE — TELEPHONE ENCOUNTER
Received VM from patient    STEFANI patient. Patient with question regarding when he was to come in for the 2 week EKG and what time. Patient was to have had it done last week. Advised he could come tomorrow before 12 for EKG only. Patient voiced understanding.

## 2022-10-07 ENCOUNTER — CLINICAL SUPPORT (OUTPATIENT)
Dept: CARDIOLOGY | Facility: CLINIC | Age: 42
End: 2022-10-07

## 2022-10-07 DIAGNOSIS — I45.81 LONG Q-T SYNDROME: Primary | ICD-10-CM

## 2022-10-07 PROCEDURE — 93000 ELECTROCARDIOGRAM COMPLETE: CPT | Performed by: NURSE PRACTITIONER

## 2022-10-12 PROCEDURE — G2066 INTER DEVC REMOTE 30D: HCPCS | Performed by: INTERNAL MEDICINE

## 2022-10-12 PROCEDURE — 93298 REM INTERROG DEV EVAL SCRMS: CPT | Performed by: INTERNAL MEDICINE

## 2022-10-12 NOTE — PROGRESS NOTES
ECG 12 Lead    Date/Time: 10/7/2022 8:47 AM  Performed by: Kelsey Lauren APRN  Authorized by: Kelsey Lauren APRN   Rhythm: sinus rhythm  Rate: bradycardic  BPM: 55  Conduction: right bundle branch block  ST Segments: ST segments normal  T Waves: T waves normal  Other findings comments: QTc 521    Clinical impression: abnormal EKG

## 2022-11-01 ENCOUNTER — OFFICE VISIT (OUTPATIENT)
Dept: GASTROENTEROLOGY | Facility: CLINIC | Age: 42
End: 2022-11-01

## 2022-11-01 VITALS
WEIGHT: 240.8 LBS | DIASTOLIC BLOOD PRESSURE: 87 MMHG | BODY MASS INDEX: 32.61 KG/M2 | HEIGHT: 72 IN | SYSTOLIC BLOOD PRESSURE: 140 MMHG | HEART RATE: 60 BPM

## 2022-11-01 DIAGNOSIS — K76.0 FATTY LIVER: ICD-10-CM

## 2022-11-01 DIAGNOSIS — R79.89 ELEVATED LFTS: Primary | ICD-10-CM

## 2022-11-01 DIAGNOSIS — E66.9 CLASS 1 OBESITY WITH SERIOUS COMORBIDITY AND BODY MASS INDEX (BMI) OF 32.0 TO 32.9 IN ADULT, UNSPECIFIED OBESITY TYPE: ICD-10-CM

## 2022-11-01 PROCEDURE — 99214 OFFICE O/P EST MOD 30 MIN: CPT | Performed by: NURSE PRACTITIONER

## 2022-11-01 RX ORDER — ESCITALOPRAM OXALATE 20 MG/1
20 TABLET ORAL DAILY
COMMUNITY
Start: 2022-10-15 | End: 2023-03-23

## 2022-11-01 NOTE — PROGRESS NOTES
Patient Name: Timi Hernandez   Visit Date: 11/01/2022   Patient ID: [id number]  Provider: NICOLE Estrada    Sex: male  Location:  Location Address:  Location Phone: [address]  [place] [address] [phone]    YOB: 1980  Age: 42 y.o.   Primary Care Provider Clemencia Salas APRN      Referring Provider: No ref. provider found        Chief Complaint  Fatty Liver  (6 month follow up, pt states Bms have a yellow color ) and Nausea (Pt states in the mornings )    History of Present Illness   Patient initially presented January 2019 with elevated LFTs for 1 year and complained of loose stools.  Ultrasound from November showed hepatomegaly with diffuse fatty infiltration.  Hepatic work-up negative February 2019, ferritin was noted elevated at 869 but with a normal iron profile, Fibrosure showed F0, S3, N1.  Patient does not drink alcohol.  Loose stool since gallbladder removal, patient has tried Florajen3 and noted it aggravated stools and he improved with discontinuing Florajen3; Colestid caused constipation.  Has had some relief with hyoscyamine. Pt also follows светлана Sawant, hx high TG since age 28.   Episode of diverticulitis in March 2020, symptoms also in May 2020 but testing negative     Liver biopsy 9/13/2019: moderate steatosis  Colonoscopy 12/30/2019 for fecal urgency and loose stools: Adequate prep, diverticula in the left side of the colon, 2 mm polyp in the cecum completely removed--adenomatous, grade 1 internal hemorrhoids, random colon biopsies negative   2nd opinion w Dr Mcdaniel in 2020, Dec 2020 LFTs returned normal.     Patient was last seen 3/31/2022.    Last liver ultrasound 5/24/2022: Liver enlarged and diffuse fatty liver is noted, liver measured 20.1 cm  4/5/2022: Hemochromatosis gene mutation was negative  8/31/2022:Celiac serology is negative  8/25/2022: , AST 62, alk phos 36, bilirubin normal at 0.5, albumin normal at 4.8    Pt states he's being followed  by cardio, has loop recorder. Has hx syncopal episode.   Seeing ID also, hx lyme disease, c/o muscle pain   Pt states after eating, he has sensation of feeling hot[facility]   Has bloating after meals but no pain. BM's moving normally, occasionally more loose, [place] #5-6. No blood in stool.   Patient has gained 1 pound since last visit  Past Medical History:   Diagnosis Date   • ADHD (attention deficit hyperactivity disorder) 1992   • Anxiety    • Arthritis    • Brugada syndrome 11/16/2021   • Chronic allergic rhinitis    • Essential hypertension    • Family history of colon cancer in mother    • Fatty liver    • GERD (gastroesophageal reflux disease)    • Gout    • Hepatomegaly    • Hyperlipidemia    • IBS (irritable bowel syndrome)    • Kidney stones    • Lyme disease 2021   • Right bundle branch block (RBBB), 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)    • Syncope        Past Surgical History:   Procedure Laterality Date   • CARDIAC ELECTROPHYSIOLOGY PROCEDURE N/A 02/03/2022    Procedure: EPS + Procainimide Challenge +/- SICD (BSC), no meds to hold;  Surgeon: Lang Sorensen MD;  Location: [facility] INVASIVE LOCATION;  Service: Cardiology;  Laterality: N/A;   • CHOLECYSTECTOMY  2017    Dr. Ray   • COLONOSCOPY  2019   • GALLBLADDER SURGERY     • HERNIA REPAIR     • LASIK     • TOE SURGERY     • TONSILLECTOMY     • VASECTOMY         Allergies   Allergen Reactions   • Penicillins Anaphylaxis   • Sulfamethoxazole-Trimethoprim Anaphylaxis and Rash     Other reaction(s): sore throat and ulcers   • Colchicine Rash     PT stated body was burning       Family History   Problem Relation Age of Onset   • Heart failure Mother         Heart attack in her early 50s   • Hyperlipidemia Mother    • Heart attack Mother    • Hypertension Mother    • Colon cancer Mother 53   • Stroke Mother    • Heart disease Father    • Heart disease Sister    • Lung cancer Maternal Uncle         50s   •  "Heart disease Maternal Uncle    • Stroke Maternal Grandfather    • Heart disease Maternal Grandfather    • Heart attack Other    • Heart disease Other    • Heart failure Other    • Hypertension Other    • Hyperlipidemia Other         Social History     Tobacco Use   • Smoking status: Never   • Smokeless tobacco: Never   • Tobacco comments:     second hand smoke exposure-never   Vaping Use   • Vaping Use: Never used   Substance Use Topics   • Alcohol use: Not Currently   • Drug use: Never       Objective     Vital Signs:   /87 (BP Location: Left arm, Patient Position: Sitting, Cuff Size: Adult)   Pulse 60   Ht 182.9 cm (72\")   Wt 109 kg (240 lb 12.8 oz)   BMI 32.66 kg/m²       Physical Exam  Constitutional:       General: The patient is not in acute distress.     Appearance: Normal appearance.   HENT:      Head: Normocephalic and atraumatic.      Nose: Nose normal.   Pulmonary:      Effort: Pulmonary effort is normal. No respiratory distress.   Abdominal:      General: Abdomen is flat.      Palpations: Abdomen is soft. There is no mass.      Tenderness: There is no abdominal tenderness. There is no guarding.   Musculoskeletal:      Cervical back: Neck supple.      Right lower leg: No edema.      Left lower leg: No edema.   Skin:     General: Skin is warm and dry.   Neurological:      General: No focal deficit present.      Mental Status: The patient is alert and oriented to person, place, and time.      Gait: Gait normal.   Psychiatric:         Mood and Affect: Mood normal.         Speech: Speech normal.         Behavior: Behavior normal.         Thought Content: Thought content normal.     Result Review :   The following data was reviewed by: NICOLE Estrada on 11/01/2022:    CBC w/diff    CBC w/Diff 3/1/22 5/20/22 6/16/22   WBC 6.27 6.51 6.77   RBC 5.12 4.87 4.53   Hemoglobin 15.0 14.2 13.6   Hematocrit 43.9 42.1 38.1   MCV 85.7 86.4 84.1   MCH 29.3 29.2 30.0   MCHC 34.2 33.7 35.7   RDW 14.1 " 13.3 13.4   Platelets 201 202 181   Neutrophil Rel % 41.3 (A)  42.7   Immature Granulocyte Rel % 0.5  0.6 (A)   Lymphocyte Rel % 49.3 (A)  48.0 (A)   Monocyte Rel % 6.5  6.5   Eosinophil Rel % 1.9  1.8   Basophil Rel % 0.5  0.4   (A) Abnormal value            CMP    CMP 5/20/22 6/16/22 8/25/22 8/25/22      1220 1220   Glucose  91  95   BUN  16  18   Creatinine  0.96  0.96   Sodium  138  137   Potassium  4.3  4.4   Chloride  102  99   Calcium  9.4  9.6   Albumin 4.80 4.80 4.80    Total Bilirubin 0.6 0.6 0.5    Alkaline Phosphatase 34 (A) 35 (A) 36 (A)    AST (SGOT) 62 (A) 56 (A) 62 (A)    ALT (SGPT) 111 (A) 101 (A) 122 (A)    (A) Abnormal value              Liver Workup   A-1 Antitrypsin   Date Value Ref Range Status   02/04/2019 130 90 - 200 mg/dL Final     ds DNA Antibody   Date Value Ref Range Status   04/22/2021 NEGATIVE [IU]/mL Final     Ceruloplasmin   Date Value Ref Range Status   02/04/2019 21.3 16.0 - 31.0 mg/dL Final     Comment:       Performed at: , [facility] [address], [id number]  Toño Doss, PhD, Phone:  [phone]       Ferritin   Date Value Ref Range Status   06/16/2022 712.00 (H) 30.00 - 400.00 ng/mL Final     IgA   Date Value Ref Range Status   08/31/2022 276 90 - 386 mg/dL Final     Iron   Date Value Ref Range Status   06/16/2022 111 59 - 158 mcg/dL Final     TIBC   Date Value Ref Range Status   06/16/2022 443 298 - 536 mcg/dL Final     Iron Saturation   Date Value Ref Range Status   06/16/2022 25 20 - 50 % Final     Transferrin   Date Value Ref Range Status   06/16/2022 297 200 - 360 mg/dL Final     Mitochondrial Ab   Date Value Ref Range Status   02/04/2019 <20.0 0.0 - 20.0 Units Final     Comment:                                     Negative    0.0 - 20.0                                  Equivocal  20.1 - 24.9                                  Positive         >24.9  Mitochondrial (M2) Antibodies are found in 90-96% of  patients with primary biliary  cirrhosis.    Performed at: , [facility] [address], [place], [place], [id number]  Toño Doss, PhD, Phone:  [phone]       Protime   Date Value Ref Range Status   05/20/2022 14.1 11.8 - 14.9 Seconds Final     Comment:     Note new Reference Range     INR   Date Value Ref Range Status   05/20/2022 1.08 0.86 - 1.15 Final     Comment:     Note new Reference Range     Acute HEP Panel   Hepatitis B Surface Ag   Date Value Ref Range Status   03/14/2022 Non-Reactive Non-Reactive Final     Hep A IgM   Date Value Ref Range Status   03/14/2022 Non-Reactive Non-Reactive Final     Hep B C IgM   Date Value Ref Range Status   03/14/2022 Non-Reactive Non-Reactive Final     Hepatitis C Ab   Date Value Ref Range Status   03/14/2022 Non-Reactive Non-Reactive Final               Assessment and Plan    Diagnoses and all orders for this visit:    1. Elevated LFTs (Primary)  -     US Liver; Future  -     Liver Elastography    2. Fatty liver  -     Liver Elastography    3. Class 1 obesity with serious comorbidity and body mass index (BMI) of 32.0 to 32.9 in adult, unspecified obesity type            Follow Up   Return in about 6 months (around 5/1/2023).   Cont w low carb diet / wt loss, pt not allowed to have coffee based on cardio  [facility] of [facility] - [facility]  Liver US and Fibroscan ordered  Encouraged trialing low calorie diet 1500 antonio/d  Suggested looking into Plenity for wt loss    Patient was given instructions and counseling regarding his condition or for health maintenance advice. Please see specific information pulled into the AVS if appropriate.

## 2022-11-02 ENCOUNTER — PATIENT MESSAGE (OUTPATIENT)
Dept: GASTROENTEROLOGY | Facility: CLINIC | Age: 42
End: 2022-11-02

## 2022-11-02 DIAGNOSIS — D64.9 ANEMIA, UNSPECIFIED TYPE: Primary | ICD-10-CM

## 2022-11-08 ENCOUNTER — LAB (OUTPATIENT)
Dept: LAB | Facility: HOSPITAL | Age: 42
End: 2022-11-08

## 2022-11-08 ENCOUNTER — OFFICE VISIT (OUTPATIENT)
Dept: CARDIOLOGY | Facility: CLINIC | Age: 42
End: 2022-11-08

## 2022-11-08 VITALS
SYSTOLIC BLOOD PRESSURE: 132 MMHG | BODY MASS INDEX: 32.1 KG/M2 | WEIGHT: 237 LBS | DIASTOLIC BLOOD PRESSURE: 88 MMHG | OXYGEN SATURATION: 98 % | HEIGHT: 72 IN | HEART RATE: 75 BPM

## 2022-11-08 DIAGNOSIS — E78.5 DYSLIPIDEMIA: ICD-10-CM

## 2022-11-08 DIAGNOSIS — R55 SYNCOPE AND COLLAPSE: Primary | ICD-10-CM

## 2022-11-08 DIAGNOSIS — I45.3: ICD-10-CM

## 2022-11-08 DIAGNOSIS — D64.9 ANEMIA, UNSPECIFIED TYPE: ICD-10-CM

## 2022-11-08 DIAGNOSIS — I45.81 LONG Q-T SYNDROME: ICD-10-CM

## 2022-11-08 LAB
ALBUMIN SERPL-MCNC: 4.4 G/DL (ref 3.5–5.2)
ALP SERPL-CCNC: 36 U/L (ref 39–117)
ALT SERPL W P-5'-P-CCNC: 48 U/L (ref 1–41)
AST SERPL-CCNC: 31 U/L (ref 1–40)
BILIRUB CONJ SERPL-MCNC: <0.2 MG/DL (ref 0–0.3)
BILIRUB INDIRECT SERPL-MCNC: ABNORMAL MG/DL
BILIRUB SERPL-MCNC: 0.4 MG/DL (ref 0–1.2)
CHOLEST SERPL-MCNC: 241 MG/DL (ref 0–200)
FERRITIN SERPL-MCNC: 442 NG/ML (ref 30–400)
HDLC SERPL-MCNC: 29 MG/DL (ref 40–60)
HEMOCCULT STL QL IA: NEGATIVE
IRON 24H UR-MRATE: 96 MCG/DL (ref 59–158)
IRON SATN MFR SERPL: 23 % (ref 20–50)
LDLC SERPL CALC-MCNC: 107 MG/DL (ref 0–100)
LDLC/HDLC SERPL: 3.12 {RATIO}
PROT SERPL-MCNC: 6.8 G/DL (ref 6–8.5)
TIBC SERPL-MCNC: 411 MCG/DL (ref 298–536)
TRANSFERRIN SERPL-MCNC: 276 MG/DL (ref 200–360)
TRIGL SERPL-MCNC: 607 MG/DL (ref 0–150)
VLDLC SERPL-MCNC: 105 MG/DL (ref 5–40)

## 2022-11-08 PROCEDURE — 93000 ELECTROCARDIOGRAM COMPLETE: CPT | Performed by: INTERNAL MEDICINE

## 2022-11-08 PROCEDURE — 82728 ASSAY OF FERRITIN: CPT

## 2022-11-08 PROCEDURE — 84466 ASSAY OF TRANSFERRIN: CPT

## 2022-11-08 PROCEDURE — 99214 OFFICE O/P EST MOD 30 MIN: CPT | Performed by: INTERNAL MEDICINE

## 2022-11-08 PROCEDURE — 36415 COLL VENOUS BLD VENIPUNCTURE: CPT

## 2022-11-08 PROCEDURE — 80076 HEPATIC FUNCTION PANEL: CPT

## 2022-11-08 PROCEDURE — 80061 LIPID PANEL: CPT

## 2022-11-08 PROCEDURE — 83540 ASSAY OF IRON: CPT

## 2022-11-08 PROCEDURE — 82274 ASSAY TEST FOR BLOOD FECAL: CPT

## 2022-11-08 NOTE — PROGRESS NOTES
Timi Hernandez  1980  [phone]    11/08/2022    [facility] CARDIOLOGY [place]     Referring Provider: No ref. provider found     Clemencia Salas, APRN  [address] [id number]    Chief Complaint   Patient presents with   • Syncope and collapse       Problem List:      1.  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 Uncle with reported long QT syndrome. Grandfather with sudden cardiac death at age 36   b. Echo 3/29/2021 ejection fraction 55%, no significant valvular heart issues.  c. Stress Test 3/29/2021 good exercise tolerance, and exercise EKG negative for ischemia, atypical chest pain and abdominal pain with exercise, no arrhythmias seen.  Study is consistent with no reversible ischemia at rest or with exercise.  This indicates a low probability of coronary artery disease in this individual  d. 3/22/2021 48-hour Holter monitor normal sinus rhythm average heart rate of 86 bpm, 8 episodes of PACs, one 5 beat run of supraventricular tachycardia in the SVT consistent with ectopic atrial tachycardia  e. 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 Cardiac MRI 10/15/2021: Normal LV systolic function EF 68%, no CMR evidence for scar or fibrosis, normal size RV with mildly reduced function of 46%  g. Event Monitor 11/5-11/19/2022: NSR HR  bpm, average HR 72. No VT. Less than 1% PACs, no PVCs  h.  EP study 2/3/2022 but no inducible ventricular tachycardia and negative procainamide challenge test.  Normal QT interval in baseline state with appropriate shortening during atrial pacing and isoproterenol.  Normal HV interval   i. Neg TTT 4/19/2022  j. Loop recorder implant 7/29/2022   2. RBBB  3. HTN  4. HLD   5. SANCHEZ   6. GERD     Allergies  Allergies   Allergen Reactions   • Penicillins Anaphylaxis   • Sulfamethoxazole-Trimethoprim Anaphylaxis and Rash     Other reaction(s): sore throat and ulcers   • Colchicine  "Rash     PT stated body was burning       Current Medications    Current Outpatient Medications:   •  aspirin 81 MG EC tablet, Take 81 mg by mouth Daily., Disp: , Rfl:   •  cetirizine (zyrTEC) 10 MG tablet, Zyrtec 10 mg oral tablet take 1 tablet (10 mg) by oral route once daily   Active, Disp: , Rfl:   •  clonazePAM (KlonoPIN) 1 MG tablet, Take 1 mg by mouth As Needed., Disp: , Rfl:   •  escitalopram (LEXAPRO) 20 MG tablet, , Disp: , Rfl:   •  fenofibric acid (TRILIPIX) 135 MG capsule delayed-release delayed release capsule, TAKE 1 CAPSULE DAILY, Disp: 90 capsule, Rfl: 3  •  hyoscyamine (LEVSIN) 0.125 MG SL tablet, PLACE 1 TABLET UNDER THE TONGUE EVERY 4-6 HOURS AS NEEDED FOR DIARRHEA/ABDOMINAL PAIN, Disp: 270 tablet, Rfl: 1  •  nadolol (CORGARD) 20 MG tablet, Take 1 tablet by mouth Daily., Disp: 90 tablet, Rfl: 3  •  niacin (NIASPAN) 1000 MG CR tablet, Take 1 tablet by mouth Every Night., Disp: 90 tablet, Rfl: 3    History of Present Illness     Pt presents for follow up of LQTS/RBBB/syncope. Since we last saw the pt, pt continues to have frequent dizziness and lightheadedness spells.  In addition he continues to have william syncope.  On review of an implantable loop recorder during every one of his episodes, the patient has been in normal sinus rhythm with no significant ventricular or atrial arrhythmias.  He continues to have difficulty with his Lyme disease and apparently is seeing a specialist.  He complains of diffuse arthralgia, fatigue, and no energy.  He has been compliant with his medical therapy.  No tachypalpitations per se.      ROS:  General:  + fatigue, No weight gain or loss  Cardiovascular:  Denies CP, PND, + syncope, + near syncope, No edema or palpitations.  Pulmonary:  Denies XIE, cough, or wheezing      Vitals:    11/08/22 1311   BP: 132/88   BP Location: Left arm   Patient Position: Sitting   Pulse: 75   SpO2: 98%   Weight: 108 kg (237 lb)   Height: 182.9 cm (72\")     Body mass index is 32.14 " kg/m².  PE:  General: NAD  Neck: no JVD, no carotid bruits, no TM  Heart RRR, NL S1, S2, S4 present, no rubs, murmurs  Lungs: CTA, no wheezes, rhonchi, or rales  Abd: soft, non-tender, NL BS  Ext: No musculoskeletal deformities, no edema, cyanosis, or clubbing  Psych: normal mood and affect    Diagnostic Data:    Interrogation of his implantable loop recorder  Or discharges demonstrates no significant AV deanna pauses, no atrial fibrillation, and no ventricular ectopy.  The patient's heart rates are 50s beats per minute generally speaking at nighttime while asleep.  His average heart rate is approximately 60 to 70 bpm while awake.      ECG 12 Lead    Date/Time: 11/8/2022 1:38 PM  Performed by: Lang Sorensen MD  Authorized by: Lang Sorensen MD   Comparison: compared with previous ECG from 10/7/2022  Similar to previous ECG  Rhythm: sinus rhythm  BPM: 70  Conduction: right bundle branch block            1. Syncope and collapse    2. Long Q-T syndrome    3. Right bundle branch block (RBBB), 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)          Plan:  1.  Syncope: Extensive monitoring during with his implant recorder as well as extensive EP studies demonstrate no significant arrhythmias noted.  I am concerned that his syncope is due to the now below since his episode started after initiation of treatment and now persist despite lower dose.  Long discussion with the patient today.  At this point I would discontinue nadolol completely and see how he does over the next 1 to 2 weeks.  If he has recurrent episodes of near syncope or syncope, then would be more aggressive along the lines of treating for possible vasovagal syncope.  If no further episodes, it is very likely that the now wall is a contributing factor.     2.  Mixed Long QT 3 Syndrome/Brugada Syndrome: Extensive work-up performed.  QT interval stable overall.  See #1.3.  Hypertension: Would not be too aggressive with blood  pressure control as this may worsen his episodes of syncope.    F/up in 6 months

## 2022-11-10 DIAGNOSIS — E78.5 DYSLIPIDEMIA: Primary | ICD-10-CM

## 2022-11-10 RX ORDER — PRAVASTATIN SODIUM 10 MG
10 TABLET ORAL NIGHTLY
Qty: 90 TABLET | Refills: 1 | Status: SHIPPED | OUTPATIENT
Start: 2022-11-10 | End: 2023-03-23

## 2022-11-10 NOTE — PROGRESS NOTES
Called and discussed results with patient he was not fasting for this lab recommended repeating with a 12-hour fast will reassess options after that

## 2022-11-11 ENCOUNTER — APPOINTMENT (OUTPATIENT)
Dept: ULTRASOUND IMAGING | Facility: HOSPITAL | Age: 42
End: 2022-11-11

## 2022-11-11 ENCOUNTER — OFFICE VISIT (OUTPATIENT)
Dept: PODIATRY | Facility: CLINIC | Age: 42
End: 2022-11-11

## 2022-11-11 VITALS
HEIGHT: 72 IN | BODY MASS INDEX: 32.23 KG/M2 | TEMPERATURE: 97.1 F | SYSTOLIC BLOOD PRESSURE: 153 MMHG | WEIGHT: 238 LBS | OXYGEN SATURATION: 98 % | DIASTOLIC BLOOD PRESSURE: 88 MMHG | HEART RATE: 92 BPM

## 2022-11-11 DIAGNOSIS — L60.0 ONYCHOCRYPTOSIS: ICD-10-CM

## 2022-11-11 DIAGNOSIS — M79.672 FOOT PAIN, BILATERAL: Primary | ICD-10-CM

## 2022-11-11 DIAGNOSIS — L03.039 PARONYCHIA OF TOE, UNSPECIFIED LATERALITY: ICD-10-CM

## 2022-11-11 DIAGNOSIS — M79.671 FOOT PAIN, BILATERAL: Primary | ICD-10-CM

## 2022-11-11 PROCEDURE — 99203 OFFICE O/P NEW LOW 30 MIN: CPT | Performed by: PODIATRIST

## 2022-11-11 PROCEDURE — 11750 EXCISION NAIL&NAIL MATRIX: CPT | Performed by: PODIATRIST

## 2022-11-11 RX ORDER — ICOSAPENT ETHYL 1000 MG/1
2 CAPSULE ORAL 2 TIMES DAILY WITH MEALS
COMMUNITY
End: 2022-11-21 | Stop reason: SDUPTHER

## 2022-11-11 NOTE — PROGRESS NOTES
[facility] - PODIATRY    Today's Date: 11/11/22    Patient Name: Timi Hernandez  MRN: [id number]  CSN: [id number]  PCP: Clemencia Salas APRN  Referring Provider: Referring, Self    SUBJECTIVE     Chief Complaint   Patient presents with   • Left Foot - Ingrown Toenail   • Right Foot - Nail Problem, Ingrown Toenail     HPI: Timi Hernandez, a 42 y.o.male, comes to clinic.    New, Established, New Problem:  New  Location:  Lateral borders of right and left first toes and total right fourth toenail  Duration:   Greater than 1 month  Onset:  Gradual  Nature:  sore with palpation.  Stable, worsening, improving:   Worsening  Aggravating factors:  Pain with shoe gear and ambulation.  Previous Treatment:  Self debridement    No other pedal complaints at this time.    Patient denies any fevers, chills, nausea, vomiting, shortness of breath, nor any other constitutional signs nor symptoms.       Past Medical History:   Diagnosis Date   • ADHD (attention deficit hyperactivity disorder) 1992   • Anxiety    • Arthritis    • Brugada syndrome 11/16/2021   • Chronic allergic rhinitis    • Essential hypertension    • Family history of colon cancer in mother    • Fatty liver    • GERD (gastroesophageal reflux disease)    • Gout    • Hepatomegaly    • Hyperlipidemia    • IBS (irritable bowel syndrome)    • Ingrown toenail    • Kidney stones    • Lyme disease 2021   • Plantar fasciitis    • Right bundle branch block (RBBB), 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)    • Syncope      Past Surgical History:   Procedure Laterality Date   • CARDIAC ELECTROPHYSIOLOGY PROCEDURE N/A 02/03/2022    Procedure: EPS + Procainimide Challenge +/- SICD (BS), no meds to hold;  Surgeon: Lang Sorensen MD;  Location: [facility] EP INVASIVE LOCATION;  Service: Cardiology;  Laterality: N/A;   • CHOLECYSTECTOMY  2017    Dr. Ray   • COLONOSCOPY  2019   • GALLBLADDER SURGERY     • HERNIA REPAIR     •  LASIK     • TOE SURGERY     • TONSILLECTOMY     • VASECTOMY       Family History   Problem Relation Age of Onset   • Heart failure Mother         Heart attack in her early 50s   • Hyperlipidemia Mother    • Heart attack Mother    • Hypertension Mother    • Colon cancer Mother 53   • Stroke Mother    • Heart disease Father    • Heart disease Sister    • Lung cancer Maternal Uncle         50s   • Heart disease Maternal Uncle    • Stroke Maternal Grandfather    • Heart disease Maternal Grandfather    • Heart attack Other    • Heart disease Other    • Heart failure Other    • Hypertension Other    • Hyperlipidemia Other      Social History     Socioeconomic History   • Marital status:    Tobacco Use   • Smoking status: Never   • Smokeless tobacco: Never   • Tobacco comments:     second hand smoke exposure-never   Vaping Use   • Vaping Use: Never used   Substance and Sexual Activity   • Alcohol use: Not Currently   • Drug use: Never   • Sexual activity: Yes     Partners: Female     Birth control/protection: Surgical     Comment: Vasectomy     Allergies   Allergen Reactions   • Penicillins Anaphylaxis   • Sulfamethoxazole-Trimethoprim Anaphylaxis and Rash     Other reaction(s): sore throat and ulcers   • Colchicine Rash     PT stated body was burning     Current Outpatient Medications   Medication Sig Dispense Refill   • aspirin 81 MG EC tablet Take 81 mg by mouth Daily.     • cetirizine (zyrTEC) 10 MG tablet Zyrtec 10 mg oral tablet take 1 tablet (10 mg) by oral route once daily   Active     • clonazePAM (KlonoPIN) 1 MG tablet Take 1 mg by mouth As Needed.     • escitalopram (LEXAPRO) 20 MG tablet      • fenofibric acid (TRILIPIX) 135 MG capsule delayed-release delayed release capsule TAKE 1 CAPSULE DAILY 90 capsule 3   • hyoscyamine (LEVSIN) 0.125 MG SL tablet PLACE 1 TABLET UNDER THE TONGUE EVERY 4-6 HOURS AS NEEDED FOR DIARRHEA/ABDOMINAL PAIN 270 tablet 1   • icosapent ethyl (Vascepa) 1 g capsule capsule Take  2 g by mouth 2 (Two) Times a Day With Meals.     • nadolol (CORGARD) 20 MG tablet Take 1 tablet by mouth Daily. 90 tablet 3   • niacin (NIASPAN) 1000 MG CR tablet Take 1 tablet by mouth Every Night. 90 tablet 3   • pravastatin (PRAVACHOL) 10 MG tablet Take 1 tablet by mouth Every Night. 90 tablet 1     No current facility-administered medications for this visit.     Review of Systems   Constitutional: Negative.    Skin:        Painful Bilateral in-grown toenails   All other systems reviewed and are negative.      OBJECTIVE     Vitals:    11/11/22 0827   BP: 153/88   Pulse: 92   Temp: 97.1 °F (36.2 °C)   SpO2: 98%       PHYSICAL EXAM  GEN:      Foot/Ankle Exam:       General:   Appearance: appears stated age and healthy    Orientation: AAOx3    Affect: appropriate    Gait: unimpaired    Shoe Gear:  Casual shoes    VASCULAR      Right Foot Vascularity   Normal vascular exam    Dorsalis pedis:  2+  Posterior tibial:  2+  Skin Temperature: warm    Edema Grading:  None  CFT:  < 3 seconds  Pedal Hair Growth:  Present  Varicosities: none       Left Foot Vascularity   Normal vascular exam    Dorsalis pedis:  2+  Posterior tibial:  2+  Skin Temperature: warm    Edema Grading:  None  CFT:  < 3 seconds  Pedal Hair Growth:  Present  Varicosities: none        NEUROLOGIC     Right Foot Neurologic   Normal sensation    Light touch sensation:  Normal  Vibratory sensation:  Normal  Hot/Cold sensation: normal       Left Foot Neurologic   Normal sensation    Light touch sensation:  Normal  Vibratory sensation:  Normal  Hot/cold sensation: normal       MUSCLE STRENGTH     Right Foot Muscle Strength   Foot dorsiflexion:  4  Foot plantar flexion:  4  Foot inversion:  4  Foot eversion:  4     Left Foot Muscle Strength   Foot dorsiflexion:  4  Foot plantar flexion:  4  Foot inversion:  4  Foot eversion:  4     RANGE OF MOTION      Right Foot Range of Motion   Foot and ankle ROM within normal limits       Left Foot Range of Motion   Foot  and ankle ROM within normal limits       DERMATOLOGIC     Right Foot Dermatologic   Skin: skin intact    Nails: ingrown toenail and paronychia    Nails comment:  Right 1st lateral nail border; right fourth total toenail and surrounding nail borders     Left Foot Dermatologic   Skin: skin intact    Nails: ingrown toenail and paronychia    Nails comment:  Left first lateral nail border      ASSESSMENT/PLAN     Diagnoses and all orders for this visit:    1. Foot pain, bilateral (Primary)    2. Onychocryptosis    3. Paronychia of toe, unspecified laterality      Comprehensive lower extremity examination and evaluation was performed.    Discussed findings and treatment plan including risks, benefits, and treatment options with patient in detail. Patient agreed with treatment plan.    Phenol and Alcohol Chemical Matrixectomy Procedure - This procedure is indicated for onychocryptosis of the lateral borders of the right and left first toes and right fourth total nail and surrounding nail borders. Indications, risks and benefits and alternative treatments have been discussed with this patient who has agreed to this procedure. The area was sterilely prepped with a povidone-iodine solution. The affected area was locally anesthetized with 3 ml, of lidocaine 1%. The offending nail plate was completely excised.  Next 3 applications of 89% phenol were applied to the matrix area x 30 seconds followed by irrigation with copious amounts of isopropyl alcohol.  A sterile dressing was applied. The patient tolerated the procedure well.     Patient was given post procedure care instructions.  The patient states understanding and agreement with this plan.    An After Visit Summary was printed and given to the patient at discharge, including (if requested) any available informative/educational handouts regarding diagnosis, treatment, or medications. All questions were answered to patient/family satisfaction. Should symptoms fail to improve  or worsen they agree to call or return to clinic or to go to the Emergency Department. Discussed the importance of following up with any needed screening tests/labs/specialist appointments and any requested follow-up recommended by me today. Importance of maintaining follow-up discussed and patient accepts that missed appointments can delay diagnosis and potentially lead to worsening of conditions.    Return in about 2 weeks (around 11/25/2022) for Post-Procedure., or sooner if acute issues arise.    This document has been electronically signed by Billy Weeks DPM on November 11, 2022 09:10 EST

## 2022-11-12 PROCEDURE — G2066 INTER DEVC REMOTE 30D: HCPCS | Performed by: INTERNAL MEDICINE

## 2022-11-12 PROCEDURE — 93298 REM INTERROG DEV EVAL SCRMS: CPT | Performed by: INTERNAL MEDICINE

## 2022-11-14 DIAGNOSIS — D64.9 ANEMIA, UNSPECIFIED TYPE: Primary | ICD-10-CM

## 2022-11-21 RX ORDER — ICOSAPENT ETHYL 1000 MG/1
2 CAPSULE ORAL 2 TIMES DAILY WITH MEALS
Qty: 360 CAPSULE | Refills: 1 | Status: SHIPPED | OUTPATIENT
Start: 2022-11-21 | End: 2022-11-22 | Stop reason: SDUPTHER

## 2022-11-22 RX ORDER — NIACIN 1000 MG/1
1000 TABLET, EXTENDED RELEASE ORAL NIGHTLY
Qty: 90 TABLET | Refills: 3 | Status: SHIPPED | OUTPATIENT
Start: 2022-11-22 | End: 2022-11-23 | Stop reason: DRUGHIGH

## 2022-11-22 RX ORDER — ICOSAPENT ETHYL 1000 MG/1
2 CAPSULE ORAL 2 TIMES DAILY WITH MEALS
Qty: 360 CAPSULE | Refills: 3 | Status: SHIPPED | OUTPATIENT
Start: 2022-11-22 | End: 2023-02-01

## 2022-11-23 ENCOUNTER — HOSPITAL ENCOUNTER (OUTPATIENT)
Dept: ULTRASOUND IMAGING | Facility: HOSPITAL | Age: 42
Discharge: HOME | End: 2022-11-23
Admitting: NURSE PRACTITIONER

## 2022-11-23 ENCOUNTER — PATIENT MESSAGE (OUTPATIENT)
Dept: GASTROENTEROLOGY | Facility: CLINIC | Age: 42
End: 2022-11-23

## 2022-11-23 DIAGNOSIS — R79.89 ELEVATED LFTS: ICD-10-CM

## 2022-11-23 PROCEDURE — 76705 ECHO EXAM OF ABDOMEN: CPT

## 2022-11-23 RX ORDER — NIACIN 500 MG/1
500 TABLET, EXTENDED RELEASE ORAL NIGHTLY
Qty: 90 TABLET | Refills: 3 | Status: SHIPPED | OUTPATIENT
Start: 2022-11-23 | End: 2022-11-23

## 2022-11-23 RX ORDER — NIACIN 500 MG/1
500 TABLET, EXTENDED RELEASE ORAL NIGHTLY
Qty: 90 TABLET | Refills: 3 | Status: SHIPPED | OUTPATIENT
Start: 2022-11-23

## 2022-11-23 NOTE — TELEPHONE ENCOUNTER
PT states he can not take 1000mg niacin makes his head feel like its on fire and itch. He does good on 500mg. Okay to send in. Send in to Mail service.

## 2022-11-28 ENCOUNTER — OFFICE VISIT (OUTPATIENT)
Dept: PODIATRY | Facility: CLINIC | Age: 42
End: 2022-11-28

## 2022-11-28 VITALS
OXYGEN SATURATION: 97 % | HEART RATE: 94 BPM | WEIGHT: 238 LBS | BODY MASS INDEX: 32.23 KG/M2 | DIASTOLIC BLOOD PRESSURE: 96 MMHG | TEMPERATURE: 96.7 F | SYSTOLIC BLOOD PRESSURE: 147 MMHG | HEIGHT: 72 IN

## 2022-11-28 DIAGNOSIS — L60.0 ONYCHOCRYPTOSIS: ICD-10-CM

## 2022-11-28 DIAGNOSIS — M79.672 FOOT PAIN, BILATERAL: Primary | ICD-10-CM

## 2022-11-28 DIAGNOSIS — L03.039 PARONYCHIA OF TOE, UNSPECIFIED LATERALITY: ICD-10-CM

## 2022-11-28 DIAGNOSIS — M79.671 FOOT PAIN, BILATERAL: Primary | ICD-10-CM

## 2022-11-28 PROCEDURE — 99212 OFFICE O/P EST SF 10 MIN: CPT | Performed by: PODIATRIST

## 2022-11-28 NOTE — PROGRESS NOTES
[facility] - PODIATRY    Today's Date: 11/28/22    Patient Name: Timi Hernandez  MRN: [id number]  CSN: [id number]  PCP: Clemencia Salas APRN  Referring Provider: No ref. provider found    SUBJECTIVE     Chief Complaint   Patient presents with   • Left Foot - Ingrown Toenail, Follow-up   • Right Foot - Follow-up, Ingrown Toenail     And nail avulsion     HPI: Timi Hernandez, a 42 y.o.male, comes to clinic.    New, Established, New Problem:  Established  Location:  Lateral borders of right and left first toes and total right fourth toenail  Duration:   Greater than 1 month  Onset:  Gradual  Nature:  sore with palpation.  Stable, worsening, improving:   Improving  Aggravating factors:  Pain with shoe gear and ambulation.  Previous Treatment:  Previous chemical matrixectomy's to affected nail borders    Patient denies any fevers, chills, nausea, vomiting, shortness of breath, nor any other constitutional signs nor symptoms.       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;  Service: Cardiology;  Laterality: N/A;   • CHOLECYSTECTOMY  2017    Dr. Ray   • COLONOSCOPY  2019   •  GALLBLADDER SURGERY     • HERNIA REPAIR     • LASIK     • TOE SURGERY     • TONSILLECTOMY     • VASECTOMY       Family History   Problem Relation Age of Onset   • Heart failure Mother         Heart attack in her early 50s   • Hyperlipidemia Mother    • Heart attack Mother    • Hypertension Mother    • Colon cancer Mother 53   • Stroke Mother    • Heart disease Father    • Heart disease Sister    • Lung cancer Maternal Uncle         50s   • Heart disease Maternal Uncle    • Stroke Maternal Grandfather    • Heart disease Maternal Grandfather    • Heart attack Other    • Heart disease Other    • Heart failure Other    • Hypertension Other    • Hyperlipidemia Other      Social History     Socioeconomic History   • Marital status:    Tobacco Use   • Smoking status: Never   • Smokeless tobacco: Never   • Tobacco comments:     second hand smoke exposure-never   Vaping Use   • Vaping Use: Never used   Substance and Sexual Activity   • Alcohol use: Not Currently   • Drug use: Never   • Sexual activity: Yes     Partners: Female     Birth control/protection: Surgical     Comment: Vasectomy     Allergies   Allergen Reactions   • Penicillins Anaphylaxis   • Sulfamethoxazole-Trimethoprim Anaphylaxis and Rash     Other reaction(s): sore throat and ulcers   • Colchicine Rash     PT stated body was burning     Current Outpatient Medications   Medication Sig Dispense Refill   • aspirin 81 MG EC tablet Take 81 mg by mouth Daily.     • cetirizine (zyrTEC) 10 MG tablet Zyrtec 10 mg oral tablet take 1 tablet (10 mg) by oral route once daily   Active     • clonazePAM (KlonoPIN) 1 MG tablet Take 1 mg by mouth As Needed.     • escitalopram (LEXAPRO) 20 MG tablet      • fenofibric acid (TRILIPIX) 135 MG capsule delayed-release delayed release capsule TAKE 1 CAPSULE DAILY 90 capsule 3   • hyoscyamine (LEVSIN) 0.125 MG SL tablet PLACE 1 TABLET UNDER THE TONGUE EVERY 4-6 HOURS AS NEEDED FOR DIARRHEA/ABDOMINAL PAIN 270 tablet 1   •  icosapent ethyl (Vascepa) 1 g capsule capsule Take 2 g by mouth 2 (Two) Times a Day With Meals. 360 capsule 3   • nadolol (CORGARD) 20 MG tablet Take 1 tablet by mouth Daily. 90 tablet 3   • niacin (NIASPAN) 500 MG CR tablet Take 1 tablet by mouth Every Night. 90 tablet 3   • pravastatin (PRAVACHOL) 10 MG tablet Take 1 tablet by mouth Every Night. 90 tablet 1     No current facility-administered medications for this visit.     Review of Systems   Constitutional: Negative.    Skin:        Follow-up for painful Bilateral in-grown toenails   All other systems reviewed and are negative.      OBJECTIVE     Vitals:    11/28/22 1335   BP: 147/96   Pulse: 94   Temp: 96.7 °F (35.9 °C)   SpO2: 97%       PHYSICAL EXAM  GEN:      Foot/Ankle Exam:       General:   Appearance: appears stated age and healthy    Orientation: AAOx3    Affect: appropriate    Gait: unimpaired    Shoe Gear:  Casual shoes    VASCULAR      Right Foot Vascularity   Normal vascular exam    Dorsalis pedis:  2+  Posterior tibial:  2+  Skin Temperature: warm    Edema Grading:  None  CFT:  < 3 seconds  Pedal Hair Growth:  Present  Varicosities: none       Left Foot Vascularity   Normal vascular exam    Dorsalis pedis:  2+  Posterior tibial:  2+  Skin Temperature: warm    Edema Grading:  None  CFT:  < 3 seconds  Pedal Hair Growth:  Present  Varicosities: none        NEUROLOGIC     Right Foot Neurologic   Normal sensation    Light touch sensation:  Normal  Vibratory sensation:  Normal  Hot/Cold sensation: normal       Left Foot Neurologic   Normal sensation    Light touch sensation:  Normal  Vibratory sensation:  Normal  Hot/cold sensation: normal       MUSCLE STRENGTH     Right Foot Muscle Strength   Foot dorsiflexion:  4  Foot plantar flexion:  4  Foot inversion:  4  Foot eversion:  4     Left Foot Muscle Strength   Foot dorsiflexion:  4  Foot plantar flexion:  4  Foot inversion:  4  Foot eversion:  4     RANGE OF MOTION      Right Foot Range of Motion   Foot  and ankle ROM within normal limits       Left Foot Range of Motion   Foot and ankle ROM within normal limits       DERMATOLOGIC     Right Foot Dermatologic   Skin: skin intact    Nails: no ingrown toenail and no paronychia    Nails comment:  Right 1st lateral nail border; right fourth total toenail and surrounding nail borders; healing without complications     Left Foot Dermatologic   Skin: skin intact    Nails: no ingrown toenail and no paronychia    Nails comment:  Left first lateral nail border; healing without complications      ASSESSMENT/PLAN     Diagnoses and all orders for this visit:    1. Foot pain, bilateral (Primary)    2. Onychocryptosis    3. Paronychia of toe, unspecified laterality      Comprehensive lower extremity examination and evaluation was performed.    Discussed findings and treatment plan including risks, benefits, and treatment options with patient in detail. Patient agreed with treatment plan.    Patient is to monitor for recurrence and any new symptoms and to contact Dr. Weeks's office for a follow-up appointment.      The patient states understanding and agreement with this plan.    An After Visit Summary was printed and given to the patient at discharge, including (if requested) any available informative/educational handouts regarding diagnosis, treatment, or medications. All questions were answered to patient/family satisfaction. Should symptoms fail to improve or worsen they agree to call or return to clinic or to go to the Emergency Department. Discussed the importance of following up with any needed screening tests/labs/specialist appointments and any requested follow-up recommended by me today. Importance of maintaining follow-up discussed and patient accepts that missed appointments can delay diagnosis and potentially lead to worsening of conditions.    Return if symptoms worsen or fail to improve., or sooner if acute issues arise.    This document has been electronically  signed by Billy Weeks DPM on November 28, 2022 14:51 EST

## 2022-12-01 NOTE — TELEPHONE ENCOUNTER
From: Timi Hernandez  To: NICOLE Estrada  Sent: 11/23/2022 3:08 PM EST  Subject: Ultrasound    I don’t know if the ultrasound that you ordered for me that I went in and got today is going to be good because it felt very rushed and they even forgot to scan my spleen and pancreas I had to remind them. I don’t even know if she scan my pancreas, she said spleen, and then I just assumed since they’re in the same area she got both. But if you’re not happy with the quality of what they give you, please let me know and let’s get this re-done and done right. I could tell you I was not happy with the service at all I felt rushed, and I feel like an ultrasound should be a little bit longer than what it was. well it has been in the past.    Dontrell Hernandez

## 2022-12-02 ENCOUNTER — DOCUMENTATION (OUTPATIENT)
Dept: ULTRASOUND IMAGING | Facility: HOSPITAL | Age: 42
End: 2022-12-02

## 2022-12-02 NOTE — PROGRESS NOTES
I was able to drill down on the event. Mr. Hernandez had a ultrasound exam recently. The exam was quicker than normal. He wanted to be sure the imaging was complete. I spoke w/ Dr. Martinez, chief of imaging to review the exam for me. Specifically, are all structure visualized in a manner to be considered fair and equitable. Or, does the patient need to return for added images. He is satisified with the exam and stated that this study is performed on our new slinkset ultrasound unit and the quality of pictures are spectacular; all measurement were taken by protocol. I do agree w/ the patient, as he stated he had to remind the tech to include images of the spleen. Note: a routine RUQ ultrasound abd scan does not by protocol include spleen. The tech added the images at the time he was on the stretcher. I asked him to call me if he needs me in the future and I'll be his advocate for care. Kyle Smyth, Manager off-site imaging.

## 2022-12-09 ENCOUNTER — PROCEDURE VISIT (OUTPATIENT)
Dept: OTHER | Facility: HOSPITAL | Age: 42
End: 2022-12-09

## 2022-12-09 DIAGNOSIS — K76.0 FATTY LIVER: Primary | ICD-10-CM

## 2022-12-09 PROCEDURE — 91200 LIVER ELASTOGRAPHY: CPT | Performed by: NURSE PRACTITIONER

## 2022-12-13 ENCOUNTER — OFFICE VISIT (OUTPATIENT)
Dept: FAMILY MEDICINE CLINIC | Facility: CLINIC | Age: 42
End: 2022-12-13

## 2022-12-13 VITALS
SYSTOLIC BLOOD PRESSURE: 148 MMHG | HEART RATE: 97 BPM | OXYGEN SATURATION: 97 % | WEIGHT: 242.6 LBS | BODY MASS INDEX: 32.86 KG/M2 | DIASTOLIC BLOOD PRESSURE: 106 MMHG | RESPIRATION RATE: 18 BRPM | HEIGHT: 72 IN | TEMPERATURE: 98 F

## 2022-12-13 DIAGNOSIS — M54.6 ACUTE MIDLINE THORACIC BACK PAIN: ICD-10-CM

## 2022-12-13 DIAGNOSIS — S06.0X0A CONCUSSION WITHOUT LOSS OF CONSCIOUSNESS, INITIAL ENCOUNTER: ICD-10-CM

## 2022-12-13 DIAGNOSIS — M25.532 LEFT WRIST PAIN: ICD-10-CM
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
## 2022-12-13 DIAGNOSIS — M54.50 ACUTE BILATERAL LOW BACK PAIN WITHOUT SCIATICA: ICD-10-CM

## 2022-12-13 DIAGNOSIS — R52 BODY ACHES: ICD-10-CM

## 2022-12-13 DIAGNOSIS — M54.2 NECK PAIN: ICD-10-CM

## 2022-12-13 DIAGNOSIS — R42 DIZZINESS: ICD-10-CM

## 2022-12-13 DIAGNOSIS — G44.201 ACUTE INTRACTABLE TENSION-TYPE HEADACHE: ICD-10-CM

## 2022-12-13 PROCEDURE — G2066 INTER DEVC REMOTE 30D: HCPCS | Performed by: INTERNAL MEDICINE

## 2022-12-13 PROCEDURE — 93298 REM INTERROG DEV EVAL SCRMS: CPT | Performed by: INTERNAL MEDICINE

## 2022-12-13 PROCEDURE — 99214 OFFICE O/P EST MOD 30 MIN: CPT

## 2022-12-13 RX ORDER — PREDNISONE 50 MG/1
50 TABLET ORAL DAILY
Qty: 5 TABLET | Refills: 0 | Status: SHIPPED | OUTPATIENT
Start: 2022-12-13 | End: 2023-03-14
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
## 2022-12-16 NOTE — PROGRESS NOTES
Liver Elastography  Performed by: Kianna Lee APRN  Authorized by: Alla Kaiser APRN   Ordering Provider: Alla Kaiser APRN    Probe:  M+  Procedure Details:  Procedure: After providing an oral and written explanation of the Fibroscan vibration controlled transient elastography (VTCE) test procedure to the patient. The patient was placed in supine position with right arm in maximum abduction to allow optimal exposure of right lateral abdomen. Patient was briefly assessed, identifying terminus of the xyphoid process and locating an ideal transient elastography testing site, midline and lateral to this point. Patient was instructed to breathe normally and remain stationary during the test process. Pre-measurement data confirmed the transient elastography probe was centered over the liver parenchyma. A series of ten 50 Hz mechanical pulses were applied with controlled application pressure to induce a mechanical shear wave in the liver tissue. For each measurement, the shear wave propagation speed was detected, displayed and converted to its equivalent liver stiffness value in kilopascals. Skin to liver capsules distance and shear wave characteristics were monitored during the entire examination to assure quality data. Median liver stiffness measurement and interquartile range were calculated and displayed in real time. Acquired measurement data was stored and submitted to the provider for review and interpretation. Patient tolerated the procedure well and was discharged without incident.   Clinical Information:     NPO 3 Hours or More: Yes      Actively Drinking: No    Findings:     Median Liver Stiffness Score:  8.5    Interquartile Range (IQR) to Median Ratio:  10    Madyson Stiffness Consistent with:  F2    Current Scan Considered Reliab: Yes      Median Controlled Attenuation Parameter (dB/m):  315    IQR:  6    CAP SCORE:  Moderate/severe liver fat

## 2023-01-12 ENCOUNTER — PATIENT MESSAGE (OUTPATIENT)
Dept: CARDIOLOGY | Facility: CLINIC | Age: 43
End: 2023-01-12
Payer: COMMERCIAL

## 2023-01-12 DIAGNOSIS — I45.81 LONG Q-T SYNDROME: Primary | ICD-10-CM

## 2023-01-12 DIAGNOSIS — I49.8 BRUGADA SYNDROME: ICD-10-CM

## 2023-01-13 PROCEDURE — 93298 REM INTERROG DEV EVAL SCRMS: CPT | Performed by: INTERNAL MEDICINE

## 2023-01-13 PROCEDURE — G2066 INTER DEVC REMOTE 30D: HCPCS | Performed by: INTERNAL MEDICINE

## 2023-02-01 DIAGNOSIS — M54.41 ACUTE BILATERAL LOW BACK PAIN WITH BILATERAL SCIATICA: Primary | ICD-10-CM

## 2023-02-01 DIAGNOSIS — M54.2 NECK PAIN: ICD-10-CM

## 2023-02-01 DIAGNOSIS — M54.42 ACUTE BILATERAL LOW BACK PAIN WITH BILATERAL SCIATICA: Primary | ICD-10-CM

## 2023-02-01 RX ORDER — OMEGA-3-ACID ETHYL ESTERS 1 G/1
2 CAPSULE, LIQUID FILLED ORAL 2 TIMES DAILY
Qty: 90 CAPSULE | Refills: 1 | Status: SHIPPED | OUTPATIENT
Start: 2023-02-01

## 2023-02-03 RX ORDER — OMEGA-3-ACID ETHYL ESTERS 1 G/1
CAPSULE, LIQUID FILLED ORAL
Qty: 368 CAPSULE | OUTPATIENT
Start: 2023-02-03

## 2023-02-07 ENCOUNTER — CLINICAL SUPPORT (OUTPATIENT)
Dept: CARDIOLOGY | Facility: CLINIC | Age: 43
End: 2023-02-07
Payer: COMMERCIAL

## 2023-02-07 ENCOUNTER — TELEPHONE (OUTPATIENT)
Dept: CARDIOLOGY | Facility: CLINIC | Age: 43
End: 2023-02-07
Payer: COMMERCIAL

## 2023-02-07 DIAGNOSIS — I45.3: Primary | ICD-10-CM

## 2023-02-07 DIAGNOSIS — I45.81 LONG Q-T SYNDROME: ICD-10-CM

## 2023-02-07 PROCEDURE — 93000 ELECTROCARDIOGRAM COMPLETE: CPT | Performed by: NURSE PRACTITIONER

## 2023-02-07 NOTE — PROGRESS NOTES
ECG 12 Lead    Date/Time: 2/7/2023 3:28 PM  Performed by: Kelsey Lauren APRN  Authorized by: Kelsey Lauren APRN   Comparison: compared with previous ECG from 2/1/2023  Similar to previous ECG  Rhythm: sinus rhythm  Rate: normal  BPM: 93  Conduction: right bundle branch block and left posterior fascicular block  ST Segments: ST segments normal  T Waves: T waves normal  QRS axis: normal  Other: no other findings    Clinical impression: abnormal EKG

## 2023-02-07 NOTE — TELEPHONE ENCOUNTER
Received VM from patient stating Dr Sorensen wanted him to come get EKG today or tomorrow.     SW patient,. Advised can come today or tomorrow before 3 for a EKG visit ONLY. Patient voiced understanding.

## 2023-02-13 PROCEDURE — G2066 INTER DEVC REMOTE 30D: HCPCS | Performed by: INTERNAL MEDICINE

## 2023-02-13 PROCEDURE — 93298 REM INTERROG DEV EVAL SCRMS: CPT | Performed by: INTERNAL MEDICINE

## 2023-02-24 ENCOUNTER — TELEPHONE (OUTPATIENT)
Dept: CARDIOLOGY | Facility: CLINIC | Age: 43
End: 2023-02-24
Payer: COMMERCIAL

## 2023-02-24 DIAGNOSIS — I45.81 LONG Q-T SYNDROME: Primary | ICD-10-CM

## 2023-02-24 DIAGNOSIS — I49.8 BRUGADA SYNDROME: ICD-10-CM

## 2023-02-24 NOTE — TELEPHONE ENCOUNTER
----- Message from Timi Hernandez sent at 2/23/2023  8:19 PM EST -----  Regarding: EEG  Contact: [phone]  Going to be upping the Dose of vyvance from 10mg to 20mg. Starting this Saturday. Can you put in for a ekg for next week.     Dontrell Hernandez  [phone]

## 2023-02-27 ENCOUNTER — HOSPITAL ENCOUNTER (OUTPATIENT)
Dept: MRI IMAGING | Facility: HOSPITAL | Age: 43
Discharge: HOME | End: 2023-02-27
Payer: COMMERCIAL

## 2023-02-27 DIAGNOSIS — M54.2 NECK PAIN: ICD-10-CM

## 2023-02-27 DIAGNOSIS — M54.42 ACUTE BILATERAL LOW BACK PAIN WITH BILATERAL SCIATICA: ICD-10-CM

## 2023-02-27 DIAGNOSIS — M54.41 ACUTE BILATERAL LOW BACK PAIN WITH BILATERAL SCIATICA: ICD-10-CM

## 2023-02-27 PROCEDURE — 72141 MRI NECK SPINE W/O DYE: CPT

## 2023-02-27 PROCEDURE — 72148 MRI LUMBAR SPINE W/O DYE: CPT

## 2023-03-01 ENCOUNTER — CLINICAL SUPPORT (OUTPATIENT)
Dept: CARDIOLOGY | Facility: CLINIC | Age: 43
End: 2023-03-01
Payer: COMMERCIAL

## 2023-03-01 DIAGNOSIS — I45.3: ICD-10-CM

## 2023-03-01 DIAGNOSIS — I49.8 BRUGADA SYNDROME: ICD-10-CM

## 2023-03-01 DIAGNOSIS — I45.81 LONG Q-T SYNDROME: Primary | ICD-10-CM

## 2023-03-01 PROCEDURE — 93000 ELECTROCARDIOGRAM COMPLETE: CPT | Performed by: NURSE PRACTITIONER

## 2023-03-01 NOTE — PROGRESS NOTES
ECG 12 Lead    Date/Time: 3/1/2023 12:51 PM  Performed by: Kelsey Lauren APRN  Authorized by: Kelsey Lauren APRN   Comparison: compared with previous ECG from 2/7/2023  Similar to previous ECG  Rhythm: sinus rhythm  Rate: normal  BPM: 82  Conduction: right bundle branch block  ST Segments: ST segments normal  T Waves: T waves normal  Other findings comments: /QTc 568    Clinical impression: abnormal EKG

## 2023-03-14 ENCOUNTER — OFFICE VISIT (OUTPATIENT)
Dept: FAMILY MEDICINE CLINIC | Facility: CLINIC | Age: 43
End: 2023-03-14
Payer: COMMERCIAL

## 2023-03-14 ENCOUNTER — OFFICE VISIT (OUTPATIENT)
Dept: CARDIOLOGY | Facility: CLINIC | Age: 43
End: 2023-03-14
Payer: COMMERCIAL

## 2023-03-14 ENCOUNTER — LAB (OUTPATIENT)
Dept: LAB | Facility: HOSPITAL | Age: 43
End: 2023-03-14
Payer: COMMERCIAL

## 2023-03-14 VITALS
RESPIRATION RATE: 19 BRPM | WEIGHT: 240.6 LBS | DIASTOLIC BLOOD PRESSURE: 88 MMHG | OXYGEN SATURATION: 98 % | BODY MASS INDEX: 32.59 KG/M2 | SYSTOLIC BLOOD PRESSURE: 116 MMHG | HEART RATE: 86 BPM | HEIGHT: 72 IN

## 2023-03-14 VITALS
BODY MASS INDEX: 32.51 KG/M2 | HEART RATE: 87 BPM | OXYGEN SATURATION: 98 % | DIASTOLIC BLOOD PRESSURE: 82 MMHG | SYSTOLIC BLOOD PRESSURE: 132 MMHG | HEIGHT: 72 IN | WEIGHT: 240 LBS

## 2023-03-14 DIAGNOSIS — I49.8 BRUGADA SYNDROME: ICD-10-CM

## 2023-03-14 DIAGNOSIS — D64.9 ANEMIA, UNSPECIFIED TYPE: ICD-10-CM

## 2023-03-14 DIAGNOSIS — M50.30 DDD (DEGENERATIVE DISC DISEASE), CERVICAL: ICD-10-CM

## 2023-03-14 DIAGNOSIS — M51.36 DDD (DEGENERATIVE DISC DISEASE), LUMBAR: ICD-10-CM

## 2023-03-14 DIAGNOSIS — R55 SYNCOPE AND COLLAPSE: Primary | ICD-10-CM

## 2023-03-14 DIAGNOSIS — K75.81 NASH (NONALCOHOLIC STEATOHEPATITIS): ICD-10-CM

## 2023-03-14 DIAGNOSIS — E66.09 CLASS 1 OBESITY DUE TO EXCESS CALORIES WITH SERIOUS COMORBIDITY AND BODY MASS INDEX (BMI) OF 32.0 TO 32.9 IN ADULT: ICD-10-CM

## 2023-03-14 DIAGNOSIS — I45.3: ICD-10-CM

## 2023-03-14 DIAGNOSIS — Z00.00 ANNUAL PHYSICAL EXAM: Primary | ICD-10-CM

## 2023-03-14 DIAGNOSIS — F90.2 ATTENTION DEFICIT HYPERACTIVITY DISORDER (ADHD), COMBINED TYPE: ICD-10-CM

## 2023-03-14 DIAGNOSIS — E78.5 DYSLIPIDEMIA: ICD-10-CM

## 2023-03-14 DIAGNOSIS — I45.81 LONG Q-T SYNDROME: ICD-10-CM

## 2023-03-14 LAB
ALBUMIN SERPL-MCNC: 4.7 G/DL (ref 3.5–5.2)
ALP SERPL-CCNC: 43 U/L (ref 39–117)
ALT SERPL W P-5'-P-CCNC: 130 U/L (ref 1–41)
AST SERPL-CCNC: 72 U/L (ref 1–40)
BASOPHILS # BLD AUTO: 0.02 10*3/MM3 (ref 0–0.2)
BASOPHILS NFR BLD AUTO: 0.3 % (ref 0–1.5)
BILIRUB CONJ SERPL-MCNC: <0.2 MG/DL (ref 0–0.3)
BILIRUB INDIRECT SERPL-MCNC: ABNORMAL MG/DL
BILIRUB SERPL-MCNC: 0.4 MG/DL (ref 0–1.2)
CHOLEST SERPL-MCNC: 247 MG/DL (ref 0–200)
DEPRECATED RDW RBC AUTO: 38.6 FL (ref 37–54)
EOSINOPHIL # BLD AUTO: 0.12 10*3/MM3 (ref 0–0.4)
EOSINOPHIL NFR BLD AUTO: 1.7 % (ref 0.3–6.2)
ERYTHROCYTE [DISTWIDTH] IN BLOOD BY AUTOMATED COUNT: 13.3 % (ref 12.3–15.4)
FERRITIN SERPL-MCNC: 560 NG/ML (ref 30–400)
HCT VFR BLD AUTO: 42.6 % (ref 37.5–51)
HDLC SERPL-MCNC: 31 MG/DL (ref 40–60)
HGB BLD-MCNC: 15.2 G/DL (ref 13–17.7)
IMM GRANULOCYTES # BLD AUTO: 0.03 10*3/MM3 (ref 0–0.05)
IMM GRANULOCYTES NFR BLD AUTO: 0.4 % (ref 0–0.5)
IRON 24H UR-MRATE: 92 MCG/DL (ref 59–158)
IRON SATN MFR SERPL: 20 % (ref 20–50)
LDLC SERPL CALC-MCNC: 146 MG/DL (ref 0–100)
LDLC/HDLC SERPL: 4.54 {RATIO}
LYMPHOCYTES # BLD AUTO: 3.05 10*3/MM3 (ref 0.7–3.1)
LYMPHOCYTES NFR BLD AUTO: 43.6 % (ref 19.6–45.3)
MCH RBC QN AUTO: 28.7 PG (ref 26.6–33)
MCHC RBC AUTO-ENTMCNC: 35.7 G/DL (ref 31.5–35.7)
MCV RBC AUTO: 80.5 FL (ref 79–97)
MONOCYTES # BLD AUTO: 0.43 10*3/MM3 (ref 0.1–0.9)
MONOCYTES NFR BLD AUTO: 6.2 % (ref 5–12)
NEUTROPHILS NFR BLD AUTO: 3.34 10*3/MM3 (ref 1.7–7)
NEUTROPHILS NFR BLD AUTO: 47.8 % (ref 42.7–76)
NRBC BLD AUTO-RTO: 0 /100 WBC (ref 0–0.2)
PLATELET # BLD AUTO: 201 10*3/MM3 (ref 140–450)
PMV BLD AUTO: 10.8 FL (ref 6–12)
PROT SERPL-MCNC: 7.6 G/DL (ref 6–8.5)
RBC # BLD AUTO: 5.29 10*6/MM3 (ref 4.14–5.8)
TIBC SERPL-MCNC: 453 MCG/DL (ref 298–536)
TRANSFERRIN SERPL-MCNC: 304 MG/DL (ref 200–360)
TRIGL SERPL-MCNC: 376 MG/DL (ref 0–150)
VLDLC SERPL-MCNC: 70 MG/DL (ref 5–40)
WBC NRBC COR # BLD: 6.99 10*3/MM3 (ref 3.4–10.8)

## 2023-03-14 PROCEDURE — 85025 COMPLETE CBC W/AUTO DIFF WBC: CPT

## 2023-03-14 PROCEDURE — 80076 HEPATIC FUNCTION PANEL: CPT

## 2023-03-14 PROCEDURE — 83540 ASSAY OF IRON: CPT

## 2023-03-14 PROCEDURE — 84466 ASSAY OF TRANSFERRIN: CPT

## 2023-03-14 PROCEDURE — 99214 OFFICE O/P EST MOD 30 MIN: CPT

## 2023-03-14 PROCEDURE — 82728 ASSAY OF FERRITIN: CPT

## 2023-03-14 PROCEDURE — 99396 PREV VISIT EST AGE 40-64: CPT

## 2023-03-14 PROCEDURE — 82746 ASSAY OF FOLIC ACID SERUM: CPT

## 2023-03-14 PROCEDURE — 99213 OFFICE O/P EST LOW 20 MIN: CPT | Performed by: PHYSICIAN ASSISTANT

## 2023-03-14 PROCEDURE — 36415 COLL VENOUS BLD VENIPUNCTURE: CPT

## 2023-03-14 PROCEDURE — 80061 LIPID PANEL: CPT

## 2023-03-14 PROCEDURE — 82607 VITAMIN B-12: CPT

## 2023-03-14 RX ORDER — LISDEXAMFETAMINE DIMESYLATE 20 MG/1
1 CAPSULE ORAL DAILY
COMMUNITY
Start: 2023-02-23

## 2023-03-14 NOTE — PROGRESS NOTES
Timi Hernandez  1980  [phone]    03/14/2023    [facility] CARDIOLOGY [place]     Referring Provider: No ref. provider found     Clemencia Salas, APRN  [address] [id number]    Chief Complaint   Patient presents with   • Syncope and collapse       Problem List:        1.  Mixed genotype Long QT 3 syndrome/Brugada Syndrome  a. Uncle with reported long QT syndrome. Grandfather with sudden cardiac death at age 36   b. Echo 3/29/2021 ejection fraction 55%, no significant valvular heart issues.  c. Stress Test 3/29/2021 good exercise tolerance, and exercise EKG negative for ischemia, atypical chest pain and abdominal pain with exercise, no arrhythmias seen.  Study is consistent with no reversible ischemia at rest or with exercise.  This indicates a low probability of coronary artery disease in this individual  d. 3/22/2021 48-hour Holter monitor normal sinus rhythm average heart rate of 86 bpm, 8 episodes of PACs, one 5 beat run of supraventricular tachycardia in the SVT consistent with ectopic atrial tachycardia  e. 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 Cardiac MRI 10/15/2021: Normal LV systolic function EF 68%, no CMR evidence for scar or fibrosis, normal size RV with mildly reduced function of 46%  g. Event Monitor 11/5-11/19/2022: NSR HR  bpm, average HR 72. No VT. Less than 1% PACs, no PVCs  h.  EP study 2/3/2022 but no inducible ventricular tachycardia and negative procainamide challenge test.  Normal QT interval in baseline state with appropriate shortening during atrial pacing and isoproterenol.  Normal HV interval   i. Neg TTT 4/19/2022  j. Loop recorder implant 7/29/2022   2. RBBB  3. HTN  4. HLD   5. SANCHEZ   6. GERD     Allergies  Allergies   Allergen Reactions   • Penicillins Anaphylaxis   • Sulfamethoxazole-Trimethoprim Anaphylaxis and Rash     Other reaction(s): sore throat and ulcers   • Colchicine  Rash     PT stated body was burning       Current Medications    Current Outpatient Medications:   •  aspirin 81 MG EC tablet, Take 1 tablet by mouth Daily., Disp: , Rfl:   •  cetirizine (zyrTEC) 10 MG tablet, Zyrtec 10 mg oral tablet take 1 tablet (10 mg) by oral route once daily   Active, Disp: , Rfl:   •  clonazePAM (KlonoPIN) 1 MG tablet, Take 1 tablet by mouth As Needed., Disp: , Rfl:   •  escitalopram (LEXAPRO) 20 MG tablet, Take 1 tablet by mouth Daily., Disp: , Rfl:   •  fenofibric acid (TRILIPIX) 135 MG capsule delayed-release delayed release capsule, TAKE 1 CAPSULE DAILY, Disp: 90 capsule, Rfl: 3  •  hyoscyamine (LEVSIN) 0.125 MG SL tablet, PLACE 1 TABLET UNDER THE TONGUE EVERY 4-6 HOURS AS NEEDED FOR DIARRHEA/ABDOMINAL PAIN, Disp: 270 tablet, Rfl: 1  •  niacin (NIASPAN) 500 MG CR tablet, Take 1 tablet by mouth Every Night., Disp: 90 tablet, Rfl: 3  •  omega-3 acid ethyl esters (LOVAZA) 1 g capsule, Take 2 capsules by mouth 2 (Two) Times a Day., Disp: 90 capsule, Rfl: 1  •  pravastatin (PRAVACHOL) 10 MG tablet, Take 1 tablet by mouth Every Night., Disp: 90 tablet, Rfl: 1  •  Vyvanse 20 MG capsule, Take 1 capsule by mouth Daily, Disp: , Rfl:     History of Present Illness     Pt presents for follow up of LQT3/Brugada/RBBB/HTN. Since we last saw the pt, pt denies any tachypalpitations, SOB, CP, LH, and dizziness. Denies any hospitalizations, ER visits, bleeding, or TIA/CVA symptoms. Overall feels well.  Since stopping his beta-blocker, his syncopal episodes have completely resolved.  He does note some lightheadedness on occasion but these are rare.  His Vyvanse was increased since we saw him but a repeat EKG demonstrated no QT changes which was done on 3/1/2023.  Blood pressures have been stable at home as well.  He feels better on the higher dose of his Vyvanse from a psychiatric standpoint.    ROS:  General:  + fatigue, No weight gain or loss  Cardiovascular:  Denies CP, PND, syncope, near syncope, edema  "or palpitations.  Pulmonary:  Denies XIE, cough, or wheezing      Vitals:    03/14/23 1108   BP: 132/82   BP Location: Left arm   Patient Position: Sitting   Pulse: 87   SpO2: 98%   Weight: 109 kg (240 lb)   Height: 182.9 cm (72\")     Body mass index is 32.55 kg/m².  PE:  General: NAD  Neck: no JVD, no carotid bruits, no TM  Heart RRR, NL S1, S2, S4 present, no rubs, murmurs  Lungs: CTA, no wheezes, rhonchi, or rales  Abd: soft, non-tender, NL BS  Ext: No musculoskeletal deformities, no edema, cyanosis, or clubbing  Psych: normal mood and affect    Diagnostic Data:    Loop Recorder Interrogation: Normal liver function.  No significant bradycardia arrhythmias.  No ventricular arrhythmias.  No SVT episodes.    Review of the twelve-lead EKG on 3/1/2023 demonstrates no significant change in his QT intervals.    Procedures           1. Syncope and collapse    2. Long Q-T syndrome    3. Right bundle branch block (RBBB), 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)          Plan:    1.  Syncope:   Extensive monitoring during with his implant recorder as well as extensive EP studies demonstrate no significant arrhythmias noted.   - discontinued Nadolol 11/2022 with resolution of his syncopal episodes.  Interrogation of implantable loop recorder today demonstrates no significant arrhythmias.     2.  Mixed Long QT 3 Syndrome/Brugada Syndrome: Extensive work-up performed.  QT interval stable overall.  See #1.    3.  Hypertension: Would not be too aggressive with blood pressure control as this may worsen his episodes of syncope.    F/up in 12 months    Electronically signed by MELODY New, 03/14/23, 11:30 AM EDT.    "
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
## 2023-03-15 LAB
FOLATE SERPL-MCNC: >20 NG/ML (ref 4.78–24.2)
VIT B12 BLD-MCNC: 624 PG/ML (ref 211–946)

## 2023-03-16 PROCEDURE — G2066 INTER DEVC REMOTE 30D: HCPCS | Performed by: INTERNAL MEDICINE

## 2023-03-16 PROCEDURE — 93298 REM INTERROG DEV EVAL SCRMS: CPT | Performed by: INTERNAL MEDICINE

## 2023-03-23 ENCOUNTER — OFFICE VISIT (OUTPATIENT)
Dept: CARDIOLOGY | Facility: CLINIC | Age: 43
End: 2023-03-23
Payer: COMMERCIAL

## 2023-03-23 VITALS
SYSTOLIC BLOOD PRESSURE: 139 MMHG | HEIGHT: 72 IN | WEIGHT: 242 LBS | HEART RATE: 81 BPM | DIASTOLIC BLOOD PRESSURE: 73 MMHG | BODY MASS INDEX: 32.78 KG/M2

## 2023-03-23 DIAGNOSIS — R03.0 PREHYPERTENSION: ICD-10-CM

## 2023-03-23 DIAGNOSIS — I45.81 LONG Q-T SYNDROME: ICD-10-CM

## 2023-03-23 DIAGNOSIS — E78.5 DYSLIPIDEMIA: Primary | ICD-10-CM

## 2023-03-23 DIAGNOSIS — I45.3: ICD-10-CM

## 2023-03-23 PROCEDURE — 99214 OFFICE O/P EST MOD 30 MIN: CPT | Performed by: INTERNAL MEDICINE

## 2023-03-23 RX ORDER — PRAVASTATIN SODIUM 10 MG
10 TABLET ORAL NIGHTLY
Qty: 90 TABLET | Refills: 3 | Status: CANCELLED | OUTPATIENT
Start: 2023-03-23

## 2023-03-23 NOTE — ASSESSMENT & PLAN NOTE
Patient primarily with elevated glycerides improved having switch over to fish oil we will repeat lipids and LFTs in next visit did  on dietary habits and exercise.  Patient does have elevated AST and ALT levels likely secondary fatty liver disease we will hold off on addition of statin at this point.

## 2023-03-23 NOTE — PROGRESS NOTES
Chief Complaint  Hyperlipidemia, Follow-up, Hypertension, and Loss of Consciousness    Subjective    Patient has had no new issues or problems since last visit.  He started on his pravastatin yet and had to switch from [facility] to [place] due to insurance coverage.    Past Medical History:   Diagnosis Date   • ADHD (attention deficit hyperactivity disorder) 1992   • Anxiety    • Arthritis    • Brugada syndrome 11/16/2021   • Chronic allergic rhinitis    • Depression 2004   • Diverticulosis 2019   • Essential hypertension    • Family history of colon cancer in mother    • Fatty liver    • GERD (gastroesophageal reflux disease)    • Gout    • Hepatomegaly    • HL (hearing loss) 2004   • Hyperlipidemia    • IBS (irritable bowel syndrome)    • Ingrown toenail    • Kidney stones    • Low back pain 2022   • Lyme disease 2021   • Plantar fasciitis    • Right bundle branch block (RBBB), 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)    • Syncope          Current Outpatient Medications:   •  aspirin 81 MG EC tablet, Take 1 tablet by mouth Daily., Disp: , Rfl:   •  cetirizine (zyrTEC) 10 MG tablet, Zyrtec 10 mg oral tablet take 1 tablet (10 mg) by oral route once daily   Active, Disp: , Rfl:   •  clonazePAM (KlonoPIN) 1 MG tablet, Take 1 tablet by mouth As Needed., Disp: , Rfl:   •  fenofibric acid (TRILIPIX) 135 MG capsule delayed-release delayed release capsule, TAKE 1 CAPSULE DAILY, Disp: 90 capsule, Rfl: 3  •  hyoscyamine (LEVSIN) 0.125 MG SL tablet, PLACE 1 TABLET UNDER THE TONGUE EVERY 4-6 HOURS AS NEEDED FOR DIARRHEA/ABDOMINAL PAIN, Disp: 270 tablet, Rfl: 1  •  niacin (NIASPAN) 500 MG CR tablet, Take 1 tablet by mouth Every Night., Disp: 90 tablet, Rfl: 3  •  omega-3 acid ethyl esters (LOVAZA) 1 g capsule, Take 2 capsules by mouth 2 (Two) Times a Day., Disp: 90 capsule, Rfl: 1  •  Vyvanse 20 MG capsule, Take 1 capsule by mouth Daily, Disp: , Rfl:     Medications Discontinued During This  "Encounter   Medication Reason   • escitalopram (LEXAPRO) 20 MG tablet *Therapy completed   • pravastatin (PRAVACHOL) 10 MG tablet      Allergies   Allergen Reactions   • Penicillins Anaphylaxis   • Sulfamethoxazole-Trimethoprim Anaphylaxis and Rash     Other reaction(s): sore throat and ulcers   • Colchicine Rash     PT stated body was burning        Social History     Tobacco Use   • Smoking status: Never   • Smokeless tobacco: Never   • Tobacco comments:     second hand smoke exposure-never   Vaping Use   • Vaping Use: Never used   Substance Use Topics   • Alcohol use: Not Currently   • Drug use: Never       Family History   Problem Relation Age of Onset   • Heart failure Mother         Heart attack in her early 50s   • Hyperlipidemia Mother    • Heart attack Mother    • Hypertension Mother    • Colon cancer Mother 53   • Stroke Mother    • Alcohol abuse Mother    • Anxiety disorder Mother    • Cancer Mother         colon   • Developmental Disability Mother    • Heart disease Father    • Alcohol abuse Father    • Heart disease Sister    • Lung cancer Maternal Uncle         50s   • Heart disease Maternal Uncle    • Alcohol abuse Maternal Uncle    • Stroke Maternal Grandfather    • Heart disease Maternal Grandfather    • Heart attack Other    • Heart disease Other    • Heart failure Other    • Hypertension Other    • Hyperlipidemia Other         Objective     /73   Pulse 81   Ht 182.9 cm (72\")   Wt 110 kg (242 lb)   BMI 32.82 kg/m²       Physical Exam    General Appearance:   · no acute distress  · Alert and oriented x3  HENT:   · lips not cyanotic  · Atraumatic  Neck:  · No jvd   · supple  Respiratory:  · no respiratory distress  · normal breath sounds  · no rales  Cardiovascular:  · Regular rate and rhythm  · no S3, no S4   · no murmur  · no rub  Extremities  · No cyanosis  · lower extremity edema: none    Skin:   · warm, dry  · No rashes      Result Review :     No results found for: PROBNP  CMP    CMP " 8/25/22 8/25/22 11/8/22 3/14/23    1220 1220     Glucose  95     BUN  18     Creatinine  0.96     eGFR  101.2     Sodium  137     Potassium  4.4     Chloride  99     Calcium  9.6     Total Protein 7.1  6.8 7.6   Albumin 4.80  4.40 4.7   Total Bilirubin 0.5  0.4 0.4   Alkaline Phosphatase 36 (A)  36 (A) 43   AST (SGOT) 62 (A)  31 72 (A)   ALT (SGPT) 122 (A)  48 (A) 130 (A)   BUN/Creatinine Ratio  18.8     Anion Gap  12.7     (A) Abnormal value       Comments are available for some flowsheets but are not being displayed.           CBC w/diff    CBC w/Diff 5/20/22 6/16/22 3/14/23   WBC 6.51 6.77 6.99   RBC 4.87 4.53 5.29   Hemoglobin 14.2 13.6 15.2   Hematocrit 42.1 38.1 42.6   MCV 86.4 84.1 80.5   MCH 29.2 30.0 28.7   MCHC 33.7 35.7 35.7   RDW 13.3 13.4 13.3   Platelets 202 181 201   Neutrophil Rel %  42.7 47.8   Immature Granulocyte Rel %  0.6 (A) 0.4   Lymphocyte Rel %  48.0 (A) 43.6   Monocyte Rel %  6.5 6.2   Eosinophil Rel %  1.8 1.7   Basophil Rel %  0.4 0.3   (A) Abnormal value             Lab Results   Component Value Date    TSH 1.700 06/16/2022      Lab Results   Component Value Date    FREET4 1.20 03/01/2022      No results found for: DDIMERQUANT  No results found for: MG   No results found for: DIGOXIN   No results found for: TROPONINT        Lipid Panel    Lipid Panel 8/25/22 11/8/22 3/14/23   Total Cholesterol 255 (A) 241 (A) 247 (A)   Triglycerides 395 (A) 607 (A) 376 (A)   HDL Cholesterol 30 (A) 29 (A) 31 (A)   VLDL Cholesterol 74 (A) 105 (A) 70 (A)   LDL Cholesterol  151 (A) 107 (A) 146 (A)   LDL/HDL Ratio 4.87 3.12 4.54   (A) Abnormal value            No results found for: POCTROP                   Diagnoses and all orders for this visit:    1. Dyslipidemia (Primary)  Assessment & Plan:  Patient primarily with elevated glycerides improved having switch over to fish oil we will repeat lipids and LFTs in next visit did  on dietary habits and exercise.  Patient does have elevated AST and ALT  levels likely secondary fatty liver disease we will hold off on addition of statin at this point.      2. Long Q-T syndrome  Assessment & Plan:  Patient with genetic abnormalities consistent with long QT 3/Brugada testing did not reveal any dysrhythmias patient has a loop monitor no dysrhythmias recorded so far.  He is being followed by electrophysiology      3. Right bundle branch block (RBBB), 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)    4. Prehypertension          Follow Up     Return in about 6 months (around 9/23/2023) for EKG with F/U.          Patient was given instructions and counseling regarding his condition or for health maintenance advice. Please see specific information pulled into the AVS if appropriate.

## 2023-03-23 NOTE — ASSESSMENT & PLAN NOTE
Patient with genetic abnormalities consistent with long QT 3/Brugada testing did not reveal any dysrhythmias patient has a loop monitor no dysrhythmias recorded so far.  He is being followed by electrophysiology

## 2023-04-16 PROCEDURE — G2066 INTER DEVC REMOTE 30D: HCPCS | Performed by: INTERNAL MEDICINE

## 2023-04-16 PROCEDURE — 93298 REM INTERROG DEV EVAL SCRMS: CPT | Performed by: INTERNAL MEDICINE

## 2023-04-24 DIAGNOSIS — M50.30 DDD (DEGENERATIVE DISC DISEASE), CERVICAL: Primary | ICD-10-CM

## 2023-04-24 DIAGNOSIS — M51.36 DDD (DEGENERATIVE DISC DISEASE), LUMBAR: ICD-10-CM

## 2023-05-01 ENCOUNTER — TELEPHONE (OUTPATIENT)
Dept: GASTROENTEROLOGY | Facility: CLINIC | Age: 43
End: 2023-05-01

## 2023-05-01 NOTE — TELEPHONE ENCOUNTER
Called patient to reschedule today's appointment due to provider being out sick. No answer - left message for a return call to reschedule.

## 2023-05-03 ENCOUNTER — OFFICE VISIT (OUTPATIENT)
Dept: GASTROENTEROLOGY | Facility: CLINIC | Age: 43
End: 2023-05-03
Payer: COMMERCIAL

## 2023-05-03 ENCOUNTER — PREP FOR SURGERY (OUTPATIENT)
Dept: OTHER | Facility: HOSPITAL | Age: 43
End: 2023-05-03
Payer: COMMERCIAL

## 2023-05-03 VITALS
BODY MASS INDEX: 32.23 KG/M2 | WEIGHT: 238 LBS | HEIGHT: 72 IN | DIASTOLIC BLOOD PRESSURE: 90 MMHG | SYSTOLIC BLOOD PRESSURE: 136 MMHG | HEART RATE: 96 BPM

## 2023-05-03 DIAGNOSIS — R10.12 LEFT UPPER QUADRANT ABDOMINAL PAIN: Primary | ICD-10-CM

## 2023-05-03 DIAGNOSIS — K76.0 FATTY LIVER: ICD-10-CM

## 2023-05-03 DIAGNOSIS — R79.89 ELEVATED LFTS: Primary | ICD-10-CM

## 2023-05-03 DIAGNOSIS — E66.9 CLASS 1 OBESITY WITH SERIOUS COMORBIDITY AND BODY MASS INDEX (BMI) OF 32.0 TO 32.9 IN ADULT, UNSPECIFIED OBESITY TYPE: ICD-10-CM

## 2023-05-03 DIAGNOSIS — R10.12 LEFT UPPER QUADRANT ABDOMINAL PAIN: ICD-10-CM

## 2023-05-03 DIAGNOSIS — R12 HEARTBURN: ICD-10-CM

## 2023-05-03 RX ORDER — METHOCARBAMOL 500 MG/1
1 TABLET, FILM COATED ORAL EVERY 12 HOURS SCHEDULED
COMMUNITY
Start: 2023-04-27
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
## 2023-05-05 ENCOUNTER — TELEPHONE (OUTPATIENT)
Dept: CARDIOLOGY | Facility: CLINIC | Age: 43
End: 2023-05-05
Payer: COMMERCIAL

## 2023-05-05 NOTE — TELEPHONE ENCOUNTER
The [facility] received a fax that requires your attention. The document has been indexed to the patient’s chart for your review.      Reason for sending: PHARMACY HAS SENT IN A PATIENT REQUEST FOR A NEW PRESCRIPTION    Documents Description:  PRESCRIPTION REQUEST-FLORIAN JAFFE-5.2.23    Name of Sender: [facility]    Date Indexed: 5.5.23

## 2023-05-12 RX ORDER — OMEGA-3-ACID ETHYL ESTERS 1 G/1
2 CAPSULE, LIQUID FILLED ORAL 2 TIMES DAILY
Qty: 90 CAPSULE | Refills: 1 | Status: SHIPPED | OUTPATIENT
Start: 2023-05-12

## 2023-05-21 ENCOUNTER — HOSPITAL ENCOUNTER (OUTPATIENT)
Dept: ULTRASOUND IMAGING | Facility: HOSPITAL | Age: 43
Discharge: HOME | End: 2023-05-21
Admitting: NURSE PRACTITIONER
Payer: COMMERCIAL

## 2023-05-21 ENCOUNTER — PATIENT MESSAGE (OUTPATIENT)
Dept: GASTROENTEROLOGY | Facility: CLINIC | Age: 43
End: 2023-05-21
Payer: COMMERCIAL

## 2023-05-21 DIAGNOSIS — K76.0 FATTY LIVER: ICD-10-CM

## 2023-05-21 DIAGNOSIS — R79.89 ELEVATED LFTS: ICD-10-CM

## 2023-05-21 PROCEDURE — 76705 ECHO EXAM OF ABDOMEN: CPT

## 2023-05-22 NOTE — TELEPHONE ENCOUNTER
From: Timi Hernandez  To: Alla Ingrid Job  Sent: 5/21/2023 9:59 PM EDT  Subject: Question regarding US LIVER ABDOMEN LIMITED    Ok. Second time I went to get an ultrasound and the tech didn’t know that she needed to ultrasound my spleen. Luckily she was very professional and still took imagery. She took her time and seemed very thorough.    Now this time they gave me a score of fibrosis 2. But on all my other liver ultrasound I don’t have any fibrosis score. Also all my measurements are down sizes in cm compared to the past live and spleen ultrasounds.     So my question is, is the size of my liver and pancreas down from information? And is the score of fibrosis 2 good or bad and why have they never given me a score before?

## 2023-05-26 ENCOUNTER — PATIENT MESSAGE (OUTPATIENT)
Dept: CARDIOLOGY | Facility: CLINIC | Age: 43
End: 2023-05-26

## 2023-05-30 RX ORDER — OMEGA-3-ACID ETHYL ESTERS 1 G/1
2 CAPSULE, LIQUID FILLED ORAL 2 TIMES DAILY
Qty: 360 CAPSULE | Refills: 3 | Status: SHIPPED | OUTPATIENT
Start: 2023-05-30

## 2023-06-02 ENCOUNTER — TELEPHONE (OUTPATIENT)
Dept: CARDIOLOGY | Facility: CLINIC | Age: 43
End: 2023-06-02

## 2023-06-02 NOTE — TELEPHONE ENCOUNTER
Procedure: Colonoscopy and/or EGD     Med Directive: NA     PMH: HLD, Long QT syndrome, RBBB     Last Seen: 3/23/23

## 2023-06-05 ENCOUNTER — TELEPHONE (OUTPATIENT)
Dept: GASTROENTEROLOGY | Facility: CLINIC | Age: 43
End: 2023-06-05
Payer: COMMERCIAL

## 2023-06-05 NOTE — TELEPHONE ENCOUNTER
Left message with patient asking for a returned call to follow up on overdue results for laboratory tests.

## 2023-06-06 ENCOUNTER — LAB (OUTPATIENT)
Dept: LAB | Facility: HOSPITAL | Age: 43
End: 2023-06-06
Payer: COMMERCIAL

## 2023-06-06 DIAGNOSIS — K76.0 FATTY LIVER: ICD-10-CM

## 2023-06-06 DIAGNOSIS — R79.89 ELEVATED LFTS: ICD-10-CM

## 2023-06-06 LAB
INR PPP: 0.98 (ref 0.86–1.15)
PROTHROMBIN TIME: 13.1 SECONDS (ref 11.8–14.9)

## 2023-06-06 PROCEDURE — 36415 COLL VENOUS BLD VENIPUNCTURE: CPT

## 2023-06-06 PROCEDURE — 80076 HEPATIC FUNCTION PANEL: CPT

## 2023-06-06 PROCEDURE — 85610 PROTHROMBIN TIME: CPT

## 2023-06-07 LAB
ALBUMIN SERPL-MCNC: 5.2 G/DL (ref 3.5–5.2)
ALP SERPL-CCNC: 40 U/L (ref 39–117)
ALT SERPL W P-5'-P-CCNC: 136 U/L (ref 1–41)
AST SERPL-CCNC: 71 U/L (ref 1–40)
BILIRUB CONJ SERPL-MCNC: <0.2 MG/DL (ref 0–0.3)
BILIRUB INDIRECT SERPL-MCNC: ABNORMAL MG/DL
BILIRUB SERPL-MCNC: 0.6 MG/DL (ref 0–1.2)
PROT SERPL-MCNC: 8 G/DL (ref 6–8.5)

## 2023-06-13 ENCOUNTER — PATIENT MESSAGE (OUTPATIENT)
Dept: CARDIOLOGY | Facility: CLINIC | Age: 43
End: 2023-06-13
Payer: COMMERCIAL

## 2023-06-14 RX ORDER — ICOSAPENT ETHYL 1000 MG/1
2 CAPSULE ORAL 2 TIMES DAILY WITH MEALS
Qty: 360 CAPSULE | Refills: 3 | Status: SHIPPED | OUTPATIENT
Start: 2023-06-14 | End: 2023-06-14

## 2023-06-14 RX ORDER — OMEGA-3-ACID ETHYL ESTERS 1 G/1
CAPSULE, LIQUID FILLED ORAL
Qty: 360 CAPSULE | Refills: 3 | Status: SHIPPED | OUTPATIENT
Start: 2023-06-14

## 2023-06-15 ENCOUNTER — TELEPHONE (OUTPATIENT)
Dept: CARDIOLOGY | Facility: CLINIC | Age: 43
End: 2023-06-15
Payer: COMMERCIAL

## 2023-06-15 NOTE — TELEPHONE ENCOUNTER
The [facility] received a fax that requires your attention. The document has been indexed to the patient’s chart for your review.      Reason for sending: EXTERNAL MEDICAL RECORD NOTIFICATION    Documents Description: GENERIC MEDICATION REQUEST    Name of Sender: [facility]     Date Indexed: 06.15.2023    Notes (if needed): N/A

## 2023-06-19 ENCOUNTER — TELEPHONE (OUTPATIENT)
Dept: CARDIOLOGY | Facility: CLINIC | Age: 43
End: 2023-06-19
Payer: COMMERCIAL

## 2023-06-19 NOTE — TELEPHONE ENCOUNTER
The [facility] received a fax that requires your attention. The document has been indexed to the patient’s chart for your review.      Reason for sending: DRUG CHANGE REQUEST    Documents Description: EXT MED [id number]    Name of Sender: [facility]    Date Indexed: 06-19-23

## 2023-07-18 PROCEDURE — G2066 INTER DEVC REMOTE 30D: HCPCS | Performed by: INTERNAL MEDICINE

## 2023-07-18 PROCEDURE — 93298 REM INTERROG DEV EVAL SCRMS: CPT | Performed by: INTERNAL MEDICINE

## 2023-07-20 ENCOUNTER — TELEPHONE (OUTPATIENT)
Dept: GASTROENTEROLOGY | Facility: CLINIC | Age: 43
End: 2023-07-20
Payer: COMMERCIAL

## 2023-07-20 NOTE — TELEPHONE ENCOUNTER
Patient rescheduled --- clearance will be out dated              Cardiac Clearance Request  2023      Patient Name: Timi Hernandez  : 1980      Dear Dr. Sawant,    This patient is waiting to have a Colonoscopy and/or Esophagogastroduodenoscopy which I will perform at [facility] on _.__. Please respond to this request noting your recommendations regarding clearance from a cardiology standpoint.  You may contact our office at [phone] Option 2 with any questions. I appreciate your prompt response in this matter. Please return this form to our office as soon as possible to [phone].    ____ I approve my patient from a cardiology standpoint    ____ I do NOT approve my patient from a cardiology standpoint at this time      Approving physician name (please print): _____________________________________________      Approving physician signature: ________________________________ Date:________________      Sincerely,  [facility]  Dr. Mendez's Office                            Please fax approval or denial to our office as soon as possible.

## 2023-08-10 ENCOUNTER — OFFICE VISIT (OUTPATIENT)
Dept: NEUROSURGERY | Facility: CLINIC | Age: 43
End: 2023-08-10
Payer: COMMERCIAL

## 2023-08-10 ENCOUNTER — OFFICE VISIT (OUTPATIENT)
Dept: GASTROENTEROLOGY | Facility: CLINIC | Age: 43
End: 2023-08-10
Payer: COMMERCIAL

## 2023-08-10 ENCOUNTER — PATIENT ROUNDING (BHMG ONLY) (OUTPATIENT)
Dept: NEUROLOGY | Facility: CLINIC | Age: 43
End: 2023-08-10
Payer: COMMERCIAL

## 2023-08-10 VITALS
DIASTOLIC BLOOD PRESSURE: 84 MMHG | HEART RATE: 83 BPM | HEIGHT: 72 IN | SYSTOLIC BLOOD PRESSURE: 130 MMHG | BODY MASS INDEX: 29.87 KG/M2 | WEIGHT: 220.5 LBS

## 2023-08-10 VITALS
HEART RATE: 72 BPM | WEIGHT: 218.8 LBS | HEIGHT: 72 IN | DIASTOLIC BLOOD PRESSURE: 71 MMHG | SYSTOLIC BLOOD PRESSURE: 117 MMHG | BODY MASS INDEX: 29.64 KG/M2

## 2023-08-10 DIAGNOSIS — K76.0 FATTY LIVER: Primary | ICD-10-CM

## 2023-08-10 DIAGNOSIS — M54.2 CERVICALGIA: ICD-10-CM

## 2023-08-10 DIAGNOSIS — E66.3 OVERWEIGHT (BMI 25.0-29.9): ICD-10-CM

## 2023-08-10 DIAGNOSIS — G44.86 CERVICOGENIC HEADACHE: ICD-10-CM

## 2023-08-10 DIAGNOSIS — R12 HEARTBURN: ICD-10-CM

## 2023-08-10 DIAGNOSIS — M50.30 DEGENERATIVE DISC DISEASE, CERVICAL: ICD-10-CM
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
## 2023-08-10 DIAGNOSIS — K59.04 CHRONIC IDIOPATHIC CONSTIPATION: ICD-10-CM

## 2023-08-10 DIAGNOSIS — S16.1XXD CERVICAL MYOFASCIAL STRAIN, SUBSEQUENT ENCOUNTER: Primary | ICD-10-CM

## 2023-08-10 RX ORDER — PANTOPRAZOLE SODIUM 40 MG/1
TABLET, DELAYED RELEASE ORAL
COMMUNITY
Start: 2023-08-02

## 2023-08-10 RX ORDER — LISDEXAMFETAMINE DIMESYLATE 30 MG/1
1 CAPSULE ORAL DAILY
COMMUNITY
Start: 2023-07-22

## 2023-08-10 RX ORDER — SEMAGLUTIDE 1 MG/.5ML
INJECTION, SOLUTION SUBCUTANEOUS
COMMUNITY
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
## 2023-08-10 NOTE — PATIENT INSTRUCTIONS
-Referral to Neurology for occipital nerve blocks and trigger point injections  -Follow up as needed
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
## 2023-08-18 PROCEDURE — 93298 REM INTERROG DEV EVAL SCRMS: CPT | Performed by: INTERNAL MEDICINE

## 2023-08-18 PROCEDURE — G2066 INTER DEVC REMOTE 30D: HCPCS | Performed by: INTERNAL MEDICINE

## 2023-08-24 ENCOUNTER — TELEPHONE (OUTPATIENT)
Dept: GASTROENTEROLOGY | Facility: CLINIC | Age: 43
End: 2023-08-24
Payer: COMMERCIAL

## 2023-08-24 NOTE — TELEPHONE ENCOUNTER
Returned pt call, urged pt to keep scheduled appt due to endo scheduled being booked out to November, pt verbalized understanding and has decided not to reschedule at this time

## 2023-08-24 NOTE — TELEPHONE ENCOUNTER
"  Caller: Timi Hernandez \"Dontrell\"    Relationship to patient: Self    Best call back number: [phone]    Patient is needing: PT CALLED BECAUSE HE NEEDS TO RESCHEDULE HIS EGD ON 9/22/23. PLEASE CALL BACK AND ADVISE.     "

## 2023-09-05 ENCOUNTER — TELEPHONE (OUTPATIENT)
Dept: NEUROLOGY | Facility: CLINIC | Age: 43
End: 2023-09-05
Payer: COMMERCIAL

## 2023-09-05 NOTE — TELEPHONE ENCOUNTER
"----- Message from Margaret May sent at 9/5/2023  9:25 AM EDT -----  Regarding: FW: Neurology appointment  Contact: [phone]    ----- Message -----  From: Misty Hussein APRN  Sent: 9/5/2023   9:23 AM EDT  To: Margaret Green  Subject: FW: Neurology appointment                        Anirudh Robles. I referred this patient for cervical TPIs. It looks like he isn't scheduled until November. Is there any way we can get  him moved up just for the TPIs?      ----- Message -----  From: Naye Mondragon  Sent: 9/5/2023   8:42 AM EDT  To: NICOLE Pemberton  Subject: FW: Neurology appointment                          ----- Message -----  From: Timi Hernandez \"Dontrell\"  Sent: 9/2/2023  12:37 PM EDT  To: [facility]  Subject: Neurology appointment                            Frandy Jay,    Is there any possible way that you could help me get a faster neurology appointment right now they help me out until November 13th and I’m in a lot of pain still and I would like to get to them as soon as possible. Is there a way you could put me at the top of the list for pain in my neck? Any help would be so appreciated. It’s getting worse and I don’t want to turn to any alternative that I don’t want to. Please help me if you can.      Dontrell Hernandez  [phone]      "

## 2023-09-06 ENCOUNTER — PATIENT ROUNDING (BHMG ONLY) (OUTPATIENT)
Dept: NEUROLOGY | Facility: CLINIC | Age: 43
End: 2023-09-06
Payer: COMMERCIAL

## 2023-09-06 ENCOUNTER — OFFICE VISIT (OUTPATIENT)
Dept: NEUROLOGY | Facility: CLINIC | Age: 43
End: 2023-09-06
Payer: COMMERCIAL

## 2023-09-06 ENCOUNTER — PRIOR AUTHORIZATION (OUTPATIENT)
Dept: NEUROLOGY | Facility: CLINIC | Age: 43
End: 2023-09-06

## 2023-09-06 VITALS
HEART RATE: 90 BPM | DIASTOLIC BLOOD PRESSURE: 78 MMHG | WEIGHT: 217.2 LBS | BODY MASS INDEX: 29.42 KG/M2 | SYSTOLIC BLOOD PRESSURE: 117 MMHG | HEIGHT: 72 IN

## 2023-09-06 DIAGNOSIS — M54.81 OCCIPITAL NEURALGIA OF LEFT SIDE: Primary | ICD-10-CM

## 2023-09-06 DIAGNOSIS — M54.2 CERVICALGIA: ICD-10-CM

## 2023-09-06 RX ORDER — TIZANIDINE 4 MG/1
4 TABLET ORAL
Qty: 30 TABLET | Refills: 3 | Status: SHIPPED | OUTPATIENT
Start: 2023-09-06
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
## 2023-09-06 NOTE — ASSESSMENT & PLAN NOTE
Nightly tizanidine.  Will order trigger point injections to bilateral cervical paraspinal and trapezius muscles.

## 2023-09-08 ENCOUNTER — PATIENT MESSAGE (OUTPATIENT)
Dept: NEUROLOGY | Facility: CLINIC | Age: 43
End: 2023-09-08
Payer: COMMERCIAL
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
## 2023-09-11 ENCOUNTER — TELEPHONE (OUTPATIENT)
Dept: GASTROENTEROLOGY | Facility: CLINIC | Age: 43
End: 2023-09-11
Payer: COMMERCIAL

## 2023-09-11 NOTE — TELEPHONE ENCOUNTER
Timi Hernandez  1980    Patient requested to Cancel their EGD. I have offered to reschedule this patient and patient has declined at this time.     Reason for cancelling/rescheduling: Symptoms have improved and patient cannot hold his Wegovy at this time prior to EGD as recommended by JANNA.    This procedure was ordered by NICOLE Conner for an important reason. We want to inform you that there are risks associated with not proceeding with the procedure at this time such as a delay in diagnosis, risk of incurable disease, or cancer.    Patient plans to call us back to reschedule: Yes    Updated clearance needed?: Yes, Dr. Sawant    Patient verbalized understanding for all of the above information.    Note: Patient will maintain follow-up with JANNA in February and reassess.

## 2023-09-12 ENCOUNTER — OFFICE VISIT (OUTPATIENT)
Dept: FAMILY MEDICINE CLINIC | Facility: CLINIC | Age: 43
End: 2023-09-12
Payer: COMMERCIAL

## 2023-09-12 VITALS
SYSTOLIC BLOOD PRESSURE: 98 MMHG | TEMPERATURE: 97.9 F | WEIGHT: 211.8 LBS | DIASTOLIC BLOOD PRESSURE: 68 MMHG | HEART RATE: 70 BPM | HEIGHT: 72 IN | BODY MASS INDEX: 28.69 KG/M2 | OXYGEN SATURATION: 99 %

## 2023-09-12 DIAGNOSIS — K76.0 HEPATIC STEATOSIS: ICD-10-CM

## 2023-09-12 DIAGNOSIS — I49.8 BRUGADA SYNDROME: ICD-10-CM

## 2023-09-12 DIAGNOSIS — K21.9 GASTROESOPHAGEAL REFLUX DISEASE WITHOUT ESOPHAGITIS: Primary | ICD-10-CM

## 2023-09-12 DIAGNOSIS — M54.2 CERVICALGIA: ICD-10-CM

## 2023-09-12 DIAGNOSIS — E78.2 MIXED HYPERLIPIDEMIA: ICD-10-CM

## 2023-09-12 PROCEDURE — 99213 OFFICE O/P EST LOW 20 MIN: CPT
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
## 2023-09-13 ENCOUNTER — PROCEDURE VISIT (OUTPATIENT)
Dept: NEUROLOGY | Facility: CLINIC | Age: 43
End: 2023-09-13
Payer: COMMERCIAL

## 2023-09-13 DIAGNOSIS — M54.2 CERVICALGIA: ICD-10-CM

## 2023-09-13 DIAGNOSIS — M54.81 OCCIPITAL NEURALGIA OF LEFT SIDE: Primary | ICD-10-CM

## 2023-09-13 RX ORDER — BUPIVACAINE HYDROCHLORIDE 2.5 MG/ML
10 INJECTION, SOLUTION INFILTRATION; PERINEURAL ONCE
Status: COMPLETED | OUTPATIENT
Start: 2023-09-13 | End: 2023-09-13

## 2023-09-13 RX ADMIN — BUPIVACAINE HYDROCHLORIDE 10 ML: 2.5 INJECTION, SOLUTION INFILTRATION; PERINEURAL at 16:16

## 2023-09-20 RX ORDER — FENOFIBRIC ACID 135 MG/1
CAPSULE, DELAYED RELEASE ORAL
Qty: 90 CAPSULE | Refills: 3 | Status: SHIPPED | OUTPATIENT
Start: 2023-09-20

## 2023-09-27 ENCOUNTER — PROCEDURE VISIT (OUTPATIENT)
Dept: NEUROLOGY | Facility: CLINIC | Age: 43
End: 2023-09-27
Payer: COMMERCIAL

## 2023-09-27 DIAGNOSIS — M54.2 CERVICALGIA: ICD-10-CM

## 2023-09-27 DIAGNOSIS — M54.81 OCCIPITAL NEURALGIA OF LEFT SIDE: Primary | ICD-10-CM

## 2023-09-27 RX ORDER — BUPIVACAINE HYDROCHLORIDE 2.5 MG/ML
10 INJECTION, SOLUTION INFILTRATION; PERINEURAL ONCE
Status: COMPLETED | OUTPATIENT
Start: 2023-09-27 | End: 2023-09-27

## 2023-09-27 RX ORDER — CYCLOBENZAPRINE HCL 10 MG
10 TABLET ORAL NIGHTLY
Qty: 30 TABLET | Refills: 3 | Status: SHIPPED | OUTPATIENT
Start: 2023-09-27

## 2023-09-27 RX ADMIN — BUPIVACAINE HYDROCHLORIDE 10 ML: 2.5 INJECTION, SOLUTION INFILTRATION; PERINEURAL at 16:35

## 2023-09-27 NOTE — PROGRESS NOTES
Procedure   Inject Trigger Points, > 3    Date/Time: 9/27/2023 4:39 PM  Performed by: Halie Rodríguez APRN  Authorized by: Halie Rodríguez APRN   Local anesthesia used: yes  Anesthesia: local infiltration    Anesthesia:  Local anesthesia used: yes  Local Anesthetic: bupivacaine 0.25% without epinephrine  Anesthetic total: 7 mL    Sedation:  Patient sedated: no    Patient tolerance: patient tolerated the procedure well with no immediate complications  Comments: Injected trigger points to bilateral cervical paraspinal and trapezius muscles       CRBTPI    Date/Time: 9/27/2023 4:39 PM  Performed by: Halie Rodríguez APRN  Authorized by: Halie Rodríguez APRN   Indications: pain relief  Body area: head (Greater and Lesser occipital nerve)  Nerve: greater occipital  Laterality: left (left)    Sedation:  Patient sedated: no    Preparation: Aseptic Technique.  Patient position: sitting  Needle size: 27 G  Location technique: anatomical landmarks  Local Anesthetic: bupivacaine 0.25% without epinephrine  Anesthetic total: 3 mL  Patient tolerance: Patient tolerated the procedure well with no immediate complications

## 2023-10-19 ENCOUNTER — TELEPHONE (OUTPATIENT)
Dept: CARDIOLOGY | Facility: CLINIC | Age: 43
End: 2023-10-19
Payer: COMMERCIAL

## 2023-10-19 ENCOUNTER — PROCEDURE VISIT (OUTPATIENT)
Dept: NEUROLOGY | Facility: CLINIC | Age: 43
End: 2023-10-19
Payer: COMMERCIAL

## 2023-10-19 DIAGNOSIS — M54.2 CERVICALGIA: ICD-10-CM

## 2023-10-19 DIAGNOSIS — M54.81 OCCIPITAL NEURALGIA OF LEFT SIDE: Primary | ICD-10-CM

## 2023-10-19 RX ORDER — BUPIVACAINE HYDROCHLORIDE 2.5 MG/ML
10 INJECTION, SOLUTION INFILTRATION; PERINEURAL ONCE
Status: COMPLETED | OUTPATIENT
Start: 2023-10-19 | End: 2023-10-19

## 2023-10-19 RX ADMIN — BUPIVACAINE HYDROCHLORIDE 10 ML: 2.5 INJECTION, SOLUTION INFILTRATION; PERINEURAL at 16:18

## 2023-10-19 NOTE — TELEPHONE ENCOUNTER
"    Caller: Timi Hernandez \"Dontrell\"    Relationship: Self    Best call back number: [phone]    What orders are you requesting (.e. or imaging): LABS     In what timeframe would the patient need to come in: TODAY 10.19.23    Where will you receive your lab/imaging services: [facility]     Additional notes: DOES PATIENT NEED LABS FOR UPCOMING VISIT? IF SO PLEASE PLACE ORDER TO [facility]  TODAY IS THE BEST DAY FOR PATIENT TO GET THESE DONE. PLEASE CONTACT PATIENT VIA [facility] WHEN THE ORDER IS PLACED.       "

## 2023-10-19 NOTE — PROGRESS NOTES
Procedure   CRBTPI    Date/Time: 10/19/2023 4:25 PM    Performed by: Halie Rodríguez APRN  Authorized by: Halie Rodríguez APRN  Indications: pain relief  Body area: head (Greater and Lesser occipital nerve)  Nerve: greater occipital  Laterality: right    Sedation:  Patient sedated: no    Preparation: Aseptic Technique.  Patient position: sitting  Needle size: 27 G  Location technique: anatomical landmarks  Local Anesthetic: bupivacaine 0.25% without epinephrine  Anesthetic total: 2 (2mL per injection for total of 8mL) mL  Patient tolerance: Patient tolerated the procedure well with no immediate complications      Inject Trigger Points, > 3    Date/Time: 10/19/2023 4:25 PM    Performed by: Halie Rodríguez APRN  Authorized by: Halie Rodríguez APRN  Local anesthesia used: yes  Anesthesia: local infiltration    Anesthesia:  Local anesthesia used: yes  Local Anesthetic: bupivacaine 0.25% without epinephrine  Anesthetic total: 8 mL    Sedation:  Patient sedated: no    Patient tolerance: patient tolerated the procedure well with no immediate complications  Comments: Injected trigger points to bilateral cervical paraspinal and trapezius muscles

## 2023-10-26 ENCOUNTER — OFFICE VISIT (OUTPATIENT)
Dept: CARDIOLOGY | Facility: CLINIC | Age: 43
End: 2023-10-26
Payer: COMMERCIAL

## 2023-10-26 VITALS
BODY MASS INDEX: 27.71 KG/M2 | SYSTOLIC BLOOD PRESSURE: 126 MMHG | WEIGHT: 204.6 LBS | HEART RATE: 71 BPM | DIASTOLIC BLOOD PRESSURE: 80 MMHG | HEIGHT: 72 IN

## 2023-10-26 DIAGNOSIS — E78.5 DYSLIPIDEMIA: Primary | ICD-10-CM

## 2023-10-26 DIAGNOSIS — I45.81 LONG Q-T SYNDROME: ICD-10-CM

## 2023-10-26 DIAGNOSIS — R55 SYNCOPE AND COLLAPSE: ICD-10-CM

## 2023-10-26 PROCEDURE — 99214 OFFICE O/P EST MOD 30 MIN: CPT | Performed by: INTERNAL MEDICINE

## 2023-10-26 RX ORDER — OMEGA-3-ACID ETHYL ESTERS 1 G/1
2 CAPSULE, LIQUID FILLED ORAL 2 TIMES DAILY
Qty: 360 CAPSULE | Refills: 3 | Status: SHIPPED | OUTPATIENT
Start: 2023-10-26

## 2023-10-26 NOTE — PROGRESS NOTES
Chief Complaint  Hypertension, Hyperlipidemia, Follow-up, and Syncope    Subjective    Patient is doing very well symptomatically no complaints or problems he has had about a 40 pound weight loss since starting Wegovy.  Was not able to tolerate fish oil due to GI side effects  Past Medical History:   Diagnosis Date    ADHD (attention deficit hyperactivity disorder) 1992    Anxiety     Arthritis     Brugada syndrome 11/16/2021    Chronic allergic rhinitis     Depression 2004    Diverticulosis 2019    Essential hypertension     Family history of colon cancer in mother     Fatty liver     GERD (gastroesophageal reflux disease)     Gout     Headache     Hepatomegaly     HL (hearing loss) 2004    Hyperlipidemia     IBS (irritable bowel syndrome)     Ingrown toenail     Kidney stones     Low back pain 2022    Lyme disease 2021    Plantar fasciitis     Right bundle branch block (RBBB), 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)     Syncope          Current Outpatient Medications:     aspirin 81 MG EC tablet, Take 1 tablet by mouth Daily., Disp: , Rfl:     cetirizine (zyrTEC) 10 MG tablet, Zyrtec 10 mg oral tablet take 1 tablet (10 mg) by oral route once daily   Active, Disp: , Rfl:     clonazePAM (KlonoPIN) 1 MG tablet, Take 1 tablet by mouth As Needed., Disp: , Rfl:     cyclobenzaprine (FLEXERIL) 10 MG tablet, Take 1 tablet by mouth Every Night., Disp: 30 tablet, Rfl: 3    fenofibric acid (TRILIPIX) 135 MG capsule delayed-release delayed release capsule, TAKE 1 CAPSULE DAILY, Disp: 90 capsule, Rfl: 3    hyoscyamine (LEVSIN) 0.125 MG SL tablet, PLACE 1 TABLET UNDER THE TONGUE EVERY 4-6 HOURS AS NEEDED FOR DIARRHEA/ABDOMINAL PAIN, Disp: 270 tablet, Rfl: 1    Ibuprofen 3 %, Gabapentin 10 %, Baclofen 2 %, lidocaine 4 %, Ketamine HCl 4 %, Apply 1-2 g topically to the appropriate area as directed 3 (Three) to 4 (Four) times daily., Disp: 90 g, Rfl: 0    niacin (NIASPAN) 500 MG CR tablet, Take 1  "tablet by mouth Every Night., Disp: 90 tablet, Rfl: 3    pantoprazole (PROTONIX) 40 MG EC tablet, , Disp: , Rfl:     Semaglutide-Weight Management (Wegovy) 1 MG/0.5ML solution auto-injector, Inject  under the skin into the appropriate area as directed., Disp: , Rfl:     Vyvanse 30 MG capsule, Take 1 capsule by mouth Daily, Disp: , Rfl:     Lovaza 1 g capsule, Take 2 g by mouth twice daily (Patient not taking: Reported on 10/26/2023), Disp: 360 capsule, Rfl: 3    Lovaza 1 g capsule, Take 2 capsules by mouth 2 (Two) Times a Day., Disp: 360 capsule, Rfl: 3    There are no discontinued medications.  Allergies   Allergen Reactions    Penicillins Anaphylaxis    Sulfamethoxazole-Trimethoprim Anaphylaxis and Rash     Other reaction(s): sore throat and ulcers    Colchicine Rash     PT stated body was burning        Social History     Tobacco Use    Smoking status: Never    Smokeless tobacco: Never    Tobacco comments:     second hand smoke exposure-never   Vaping Use    Vaping Use: Never used   Substance Use Topics    Alcohol use: Not Currently    Drug use: Never       Family History   Problem Relation Age of Onset    Heart failure Mother         Heart attack in her early 50s    Hyperlipidemia Mother     Heart attack Mother     Hypertension Mother     Colon cancer Mother 53    Stroke Mother     Alcohol abuse Mother     Anxiety disorder Mother     Cancer Mother         colon    Developmental Disability Mother     Heart disease Father     Alcohol abuse Father     Heart disease Sister     Lung cancer Maternal Uncle         50s    Heart disease Maternal Uncle     Alcohol abuse Maternal Uncle     Stroke Maternal Grandfather     Heart disease Maternal Grandfather     Heart attack Other     Heart disease Other     Heart failure Other     Hypertension Other     Hyperlipidemia Other         Objective     /80 (BP Location: Left arm)   Pulse 71   Ht 182.9 cm (72\")   Wt 92.8 kg (204 lb 9.6 oz)   BMI 27.75 kg/m²       Physical " "Exam    General Appearance:   no acute distress  Alert and oriented x3  HENT:   lips not cyanotic  Atraumatic  Neck:  No jvd   supple  Respiratory:  no respiratory distress  normal breath sounds  no rales  Cardiovascular:  Regular rate and rhythm  no S3, no S4   no murmur  no rub  Extremities  No cyanosis  lower extremity edema: none    Skin:   warm, dry  No rashes      Result Review :     No results found for: \"PROBNP\"  CMP          11/8/2022    07:44 3/14/2023    10:34 6/6/2023    12:02   CMP   Total Protein 6.8  7.6  8.0    Albumin 4.40  4.7  5.2    Total Bilirubin 0.4  0.4  0.6    Alkaline Phosphatase 36  43  40    AST (SGOT) 31  72  71    ALT (SGPT) 48  130  136      CBC w/diff          3/14/2023    10:34   CBC w/Diff   WBC 6.99    RBC 5.29    Hemoglobin 15.2    Hematocrit 42.6    MCV 80.5    MCH 28.7    MCHC 35.7    RDW 13.3    Platelets 201    Neutrophil Rel % 47.8    Immature Granulocyte Rel % 0.4    Lymphocyte Rel % 43.6    Monocyte Rel % 6.2    Eosinophil Rel % 1.7    Basophil Rel % 0.3       Lab Results   Component Value Date    TSH 1.700 06/16/2022      Lab Results   Component Value Date    FREET4 1.20 03/01/2022      No results found for: \"DDIMERQUANT\"  No results found for: \"MG\"   No results found for: \"DIGOXIN\"   No results found for: \"TROPONINT\"        Lipid Panel          11/8/2022    07:44 3/14/2023    10:34   Lipid Panel   Total Cholesterol 241  247    Triglycerides 607  376    HDL Cholesterol 29  31    VLDL Cholesterol 105  70    LDL Cholesterol  107  146    LDL/HDL Ratio 3.12  4.54      No results found for: \"POCTROP\"                   Diagnoses and all orders for this visit:    1. Dyslipidemia (Primary)  Assessment & Plan:  Patient with near normalization of his lipids after weight loss we will attempt to refill Lovaza instead of his fish oil due to side effect issues to see if he can get this filled.  Continue encourage diet and exercise    Orders:  -     Lovaza 1 g capsule; Take 2 capsules by " mouth 2 (Two) Times a Day.  Dispense: 360 capsule; Refill: 3    2. Syncope and collapse  Assessment & Plan:  Patient with no recurrent episodes does report some orthostatic symptoms counseled on hydration and compression socks.  Loop recorder shows some paroxysmal SVT episodes      3. Long Q-T syndrome  Assessment & Plan:  Followed by Dr. Sorensen no significant dysrhythmias noted on implantable loop recorder              Follow Up     Return in about 6 months (around 4/26/2024) for Follow with Kelsey Lauren.          Patient was given instructions and counseling regarding his condition or for health maintenance advice. Please see specific information pulled into the AVS if appropriate.

## 2023-10-26 NOTE — ASSESSMENT & PLAN NOTE
Patient with no recurrent episodes does report some orthostatic symptoms counseled on hydration and compression socks.  Loop recorder shows some paroxysmal SVT episodes

## 2023-10-26 NOTE — ASSESSMENT & PLAN NOTE
Patient with near normalization of his lipids after weight loss we will attempt to refill Lovaza instead of his fish oil due to side effect issues to see if he can get this filled.  Continue encourage diet and exercise

## 2023-10-30 DIAGNOSIS — E78.2 MIXED HYPERLIPIDEMIA: Primary | ICD-10-CM

## 2023-10-30 DIAGNOSIS — Z01.89 ROUTINE LAB DRAW: ICD-10-CM

## 2023-10-30 DIAGNOSIS — E55.9 VITAMIN D DEFICIENCY: ICD-10-CM

## 2023-11-02 ENCOUNTER — HOSPITAL ENCOUNTER (OUTPATIENT)
Dept: ULTRASOUND IMAGING | Facility: HOSPITAL | Age: 43
Discharge: HOME | End: 2023-11-02
Admitting: NURSE PRACTITIONER
Payer: COMMERCIAL

## 2023-11-02 DIAGNOSIS — K76.0 FATTY LIVER: ICD-10-CM

## 2023-11-02 DIAGNOSIS — M54.2 CERVICALGIA: Primary | ICD-10-CM

## 2023-11-02 PROCEDURE — 76705 ECHO EXAM OF ABDOMEN: CPT

## 2023-11-03 ENCOUNTER — LAB (OUTPATIENT)
Dept: LAB | Facility: HOSPITAL | Age: 43
End: 2023-11-03
Payer: COMMERCIAL

## 2023-11-03 DIAGNOSIS — Z01.89 ROUTINE LAB DRAW: ICD-10-CM

## 2023-11-03 DIAGNOSIS — E78.2 MIXED HYPERLIPIDEMIA: ICD-10-CM

## 2023-11-03 DIAGNOSIS — M54.2 CERVICALGIA: ICD-10-CM

## 2023-11-03 DIAGNOSIS — E55.9 VITAMIN D DEFICIENCY: ICD-10-CM

## 2023-11-03 LAB
25(OH)D3 SERPL-MCNC: 29.7 NG/ML (ref 30–100)
ALBUMIN SERPL-MCNC: 4.6 G/DL (ref 3.5–5.2)
ALBUMIN/GLOB SERPL: 1.8 G/DL
ALP SERPL-CCNC: 36 U/L (ref 39–117)
ALT SERPL W P-5'-P-CCNC: 16 U/L (ref 1–41)
ANION GAP SERPL CALCULATED.3IONS-SCNC: 8.9 MMOL/L (ref 5–15)
AST SERPL-CCNC: 20 U/L (ref 1–40)
BASOPHILS # BLD AUTO: 0.01 10*3/MM3 (ref 0–0.2)
BASOPHILS NFR BLD AUTO: 0.2 % (ref 0–1.5)
BILIRUB SERPL-MCNC: 0.5 MG/DL (ref 0–1.2)
BILIRUB UR QL STRIP: NEGATIVE
BUN SERPL-MCNC: 20 MG/DL (ref 6–20)
BUN/CREAT SERPL: 18.9 (ref 7–25)
CALCIUM SPEC-SCNC: 9.8 MG/DL (ref 8.6–10.5)
CHLORIDE SERPL-SCNC: 103 MMOL/L (ref 98–107)
CHOLEST SERPL-MCNC: 153 MG/DL (ref 0–200)
CLARITY UR: CLEAR
CO2 SERPL-SCNC: 26.1 MMOL/L (ref 22–29)
COLOR UR: YELLOW
CREAT SERPL-MCNC: 1.06 MG/DL (ref 0.76–1.27)
DEPRECATED RDW RBC AUTO: 40.1 FL (ref 37–54)
EGFRCR SERPLBLD CKD-EPI 2021: 89.3 ML/MIN/1.73
EOSINOPHIL # BLD AUTO: 0.07 10*3/MM3 (ref 0–0.4)
EOSINOPHIL NFR BLD AUTO: 1.3 % (ref 0.3–6.2)
ERYTHROCYTE [DISTWIDTH] IN BLOOD BY AUTOMATED COUNT: 13 % (ref 12.3–15.4)
GLOBULIN UR ELPH-MCNC: 2.6 GM/DL
GLUCOSE SERPL-MCNC: 90 MG/DL (ref 65–99)
GLUCOSE UR STRIP-MCNC: NEGATIVE MG/DL
HCT VFR BLD AUTO: 39.7 % (ref 37.5–51)
HDLC SERPL-MCNC: 35 MG/DL (ref 40–60)
HGB BLD-MCNC: 13.4 G/DL (ref 13–17.7)
HGB UR QL STRIP.AUTO: NEGATIVE
HOLD SPECIMEN: NORMAL
IMM GRANULOCYTES # BLD AUTO: 0.01 10*3/MM3 (ref 0–0.05)
IMM GRANULOCYTES NFR BLD AUTO: 0.2 % (ref 0–0.5)
KETONES UR QL STRIP: NEGATIVE
LDLC SERPL CALC-MCNC: 95 MG/DL (ref 0–100)
LDLC/HDLC SERPL: 2.66 {RATIO}
LEUKOCYTE ESTERASE UR QL STRIP.AUTO: NEGATIVE
LYMPHOCYTES # BLD AUTO: 2.46 10*3/MM3 (ref 0.7–3.1)
LYMPHOCYTES NFR BLD AUTO: 46.7 % (ref 19.6–45.3)
MCH RBC QN AUTO: 29.1 PG (ref 26.6–33)
MCHC RBC AUTO-ENTMCNC: 33.8 G/DL (ref 31.5–35.7)
MCV RBC AUTO: 86.1 FL (ref 79–97)
MONOCYTES # BLD AUTO: 0.38 10*3/MM3 (ref 0.1–0.9)
MONOCYTES NFR BLD AUTO: 7.2 % (ref 5–12)
NEUTROPHILS NFR BLD AUTO: 2.34 10*3/MM3 (ref 1.7–7)
NEUTROPHILS NFR BLD AUTO: 44.4 % (ref 42.7–76)
NITRITE UR QL STRIP: NEGATIVE
NRBC BLD AUTO-RTO: 0 /100 WBC (ref 0–0.2)
PH UR STRIP.AUTO: 5.5 [PH] (ref 5–8)
PLATELET # BLD AUTO: 218 10*3/MM3 (ref 140–450)
PMV BLD AUTO: 10.9 FL (ref 6–12)
POTASSIUM SERPL-SCNC: 4.2 MMOL/L (ref 3.5–5.2)
PROT SERPL-MCNC: 7.2 G/DL (ref 6–8.5)
PROT UR QL STRIP: NEGATIVE
RBC # BLD AUTO: 4.61 10*6/MM3 (ref 4.14–5.8)
SODIUM SERPL-SCNC: 138 MMOL/L (ref 136–145)
SP GR UR STRIP: 1.02 (ref 1–1.03)
TRIGL SERPL-MCNC: 125 MG/DL (ref 0–150)
TSH SERPL DL<=0.05 MIU/L-ACNC: 1.82 UIU/ML (ref 0.27–4.2)
UROBILINOGEN UR QL STRIP: NORMAL
VLDLC SERPL-MCNC: 23 MG/DL (ref 5–40)
WBC NRBC COR # BLD: 5.27 10*3/MM3 (ref 3.4–10.8)

## 2023-11-03 PROCEDURE — 83655 ASSAY OF LEAD: CPT

## 2023-11-03 PROCEDURE — 82175 ASSAY OF ARSENIC: CPT

## 2023-11-03 PROCEDURE — 80061 LIPID PANEL: CPT

## 2023-11-03 PROCEDURE — 82306 VITAMIN D 25 HYDROXY: CPT

## 2023-11-03 PROCEDURE — 36415 COLL VENOUS BLD VENIPUNCTURE: CPT

## 2023-11-03 PROCEDURE — 81003 URINALYSIS AUTO W/O SCOPE: CPT

## 2023-11-03 PROCEDURE — 83825 ASSAY OF MERCURY: CPT

## 2023-11-03 PROCEDURE — 80050 GENERAL HEALTH PANEL: CPT

## 2023-11-06 ENCOUNTER — PROCEDURE VISIT (OUTPATIENT)
Dept: NEUROLOGY | Facility: CLINIC | Age: 43
End: 2023-11-06
Payer: COMMERCIAL

## 2023-11-06 DIAGNOSIS — M54.2 CERVICALGIA: ICD-10-CM

## 2023-11-06 DIAGNOSIS — M54.81 OCCIPITAL NEURALGIA OF LEFT SIDE: Primary | ICD-10-CM

## 2023-11-06 RX ORDER — BUPIVACAINE HYDROCHLORIDE 2.5 MG/ML
10 INJECTION, SOLUTION INFILTRATION; PERINEURAL ONCE
Status: COMPLETED | OUTPATIENT
Start: 2023-11-06 | End: 2023-11-06

## 2023-11-06 RX ADMIN — BUPIVACAINE HYDROCHLORIDE 10 ML: 2.5 INJECTION, SOLUTION INFILTRATION; PERINEURAL at 16:21

## 2023-11-06 NOTE — PROGRESS NOTES
Procedure   Inject Trigger Points, > 3    Date/Time: 11/6/2023 4:29 PM    Performed by: Halie Rodríguez APRN  Authorized by: Halie Rodríguez APRN  Local anesthesia used: yes  Anesthesia: local infiltration    Anesthesia:  Local anesthesia used: yes  Local Anesthetic: bupivacaine 0.25% without epinephrine  Anesthetic total: 8 mL    Sedation:  Patient sedated: no    Patient tolerance: patient tolerated the procedure well with no immediate complications  Comments: Injected trigger points to bilateral cervical paraspinal and trapezius muscles       CRBTPI    Date/Time: 11/6/2023 4:30 PM    Performed by: Halie Rodríguez APRN  Authorized by: Halie Rodríguez APRN  Indications: pain relief  Body area: head (Greater and Lesser occipital nerve)  Nerve: greater occipital  Laterality: left    Sedation:  Patient sedated: no    Preparation: Aseptic Technique.  Patient position: sitting  Needle size: 27 G  Location technique: anatomical landmarks  Local Anesthetic: bupivacaine 0.25% without epinephrine  Anesthetic total: 2 (2mL per injection for total of 8mL) mL  Patient tolerance: Patient tolerated the procedure well with no immediate complications

## 2023-11-08 LAB
ARSENIC BLD-MCNC: NORMAL UG/DL
LEAD BLDV-MCNC: NORMAL UG/DL
MERCURY BLD-MCNC: NORMAL NG/ML
SPECIMEN STATUS: NORMAL

## 2023-11-13 DIAGNOSIS — M25.50 ARTHRALGIA, UNSPECIFIED JOINT: ICD-10-CM

## 2023-11-13 DIAGNOSIS — M54.2 CERVICALGIA: Primary | ICD-10-CM

## 2023-11-17 ENCOUNTER — LAB (OUTPATIENT)
Dept: LAB | Facility: HOSPITAL | Age: 43
End: 2023-11-17
Payer: COMMERCIAL

## 2023-11-17 DIAGNOSIS — M54.2 CERVICALGIA: ICD-10-CM

## 2023-11-17 DIAGNOSIS — M25.50 ARTHRALGIA, UNSPECIFIED JOINT: ICD-10-CM

## 2023-11-17 LAB
ASO AB SERPL-ACNC: NEGATIVE [IU]/ML
CHROMATIN AB SERPL-ACNC: <10 IU/ML (ref 0–14)
CK SERPL-CCNC: 323 U/L (ref 20–200)
CRP SERPL-MCNC: 0.33 MG/DL (ref 0–0.5)
ERYTHROCYTE [SEDIMENTATION RATE] IN BLOOD: 1 MM/HR (ref 0–15)
URATE SERPL-MCNC: 5.1 MG/DL (ref 3.4–7)

## 2023-11-17 PROCEDURE — 85652 RBC SED RATE AUTOMATED: CPT

## 2023-11-17 PROCEDURE — 36415 COLL VENOUS BLD VENIPUNCTURE: CPT

## 2023-11-17 PROCEDURE — 84550 ASSAY OF BLOOD/URIC ACID: CPT

## 2023-11-17 PROCEDURE — 86431 RHEUMATOID FACTOR QUANT: CPT

## 2023-11-17 PROCEDURE — 82175 ASSAY OF ARSENIC: CPT

## 2023-11-17 PROCEDURE — 81050 URINALYSIS VOLUME MEASURE: CPT

## 2023-11-17 PROCEDURE — 86038 ANTINUCLEAR ANTIBODIES: CPT

## 2023-11-17 PROCEDURE — 86063 ANTISTREPTOLYSIN O SCREEN: CPT

## 2023-11-17 PROCEDURE — 83825 ASSAY OF MERCURY: CPT

## 2023-11-17 PROCEDURE — 82570 ASSAY OF URINE CREATININE: CPT

## 2023-11-17 PROCEDURE — 82550 ASSAY OF CK (CPK): CPT

## 2023-11-17 PROCEDURE — 86140 C-REACTIVE PROTEIN: CPT

## 2023-11-17 PROCEDURE — 83655 ASSAY OF LEAD: CPT

## 2023-11-17 PROCEDURE — 86200 CCP ANTIBODY: CPT

## 2023-11-18 LAB — CCP IGA+IGG SERPL IA-ACNC: 7 UNITS (ref 0–19)

## 2023-11-20 LAB
ANA SER QL: NEGATIVE
ARSENIC UR-MCNC: NORMAL UG/L (ref 0–9)
CREAT UR-MCNC: 1.1 G/L (ref 0.3–3)
LEAD 24H UR-MCNC: NORMAL UG/L (ref 0–49)
MERCURY 24H UR-MCNC: NORMAL UG/L (ref 0–19)

## 2023-11-22 ENCOUNTER — PATIENT MESSAGE (OUTPATIENT)
Dept: CARDIOLOGY | Facility: CLINIC | Age: 43
End: 2023-11-22
Payer: COMMERCIAL

## 2023-11-22 DIAGNOSIS — R55 SYNCOPE AND COLLAPSE: Primary | ICD-10-CM

## 2023-12-01 ENCOUNTER — TELEPHONE (OUTPATIENT)
Dept: FAMILY MEDICINE CLINIC | Facility: CLINIC | Age: 43
End: 2023-12-01
Payer: COMMERCIAL

## 2023-12-01 RX ORDER — NIACIN 500 MG/1
500 TABLET, EXTENDED RELEASE ORAL NIGHTLY
Qty: 90 TABLET | Refills: 3 | Status: SHIPPED | OUTPATIENT
Start: 2023-12-01

## 2023-12-01 NOTE — TELEPHONE ENCOUNTER
----- Message from Timi Hernandez sent at 12/1/2023 11:34 AM EST -----  Regarding: Second Opinion   Contact: [phone]  I haven’t heard from anyone about a second opinion on my neck and lower back yet?    Also will be seeing rheumatologist still cause I haven’t heard from them either.     Appreciate you!    Dontrell Hernandez

## 2023-12-04 DIAGNOSIS — M51.36 DDD (DEGENERATIVE DISC DISEASE), LUMBAR: ICD-10-CM

## 2023-12-04 DIAGNOSIS — M50.30 DDD (DEGENERATIVE DISC DISEASE), CERVICAL: Primary | ICD-10-CM

## 2023-12-06 ENCOUNTER — PROCEDURE VISIT (OUTPATIENT)
Dept: NEUROLOGY | Facility: CLINIC | Age: 43
End: 2023-12-06
Payer: COMMERCIAL

## 2023-12-06 DIAGNOSIS — M54.2 CERVICALGIA: ICD-10-CM

## 2023-12-06 DIAGNOSIS — M54.81 OCCIPITAL NEURALGIA OF LEFT SIDE: Primary | ICD-10-CM

## 2023-12-06 RX ORDER — BUPIVACAINE HYDROCHLORIDE 2.5 MG/ML
15 INJECTION, SOLUTION INFILTRATION; PERINEURAL ONCE
Status: COMPLETED | OUTPATIENT
Start: 2023-12-06 | End: 2023-12-06

## 2023-12-06 RX ADMIN — BUPIVACAINE HYDROCHLORIDE 15 ML: 2.5 INJECTION, SOLUTION INFILTRATION; PERINEURAL at 16:13

## 2023-12-06 NOTE — PROGRESS NOTES
Procedure   Inject Trigger Points, > 3    Date/Time: 12/6/2023 4:31 PM    Performed by: Halie Rodríguez APRN  Authorized by: Halie Rodríguez APRN  Local anesthesia used: yes  Anesthesia: local infiltration    Anesthesia:  Local anesthesia used: yes  Local Anesthetic: bupivacaine 0.25% without epinephrine  Anesthetic total: 7 mL    Sedation:  Patient sedated: no    Patient tolerance: patient tolerated the procedure well with no immediate complications  Comments: Injected trigger points to bilateral cervical paraspinal and trapezius muscles       CRBTPI    Date/Time: 12/6/2023 4:31 PM    Performed by: Halie Rodríguez APRN  Authorized by: Halie Rodríguez APRN  Indications: pain relief  Body area: head (Greater and Lesser occipital nerve)  Nerve: greater occipital  Laterality: Bilateral.    Sedation:  Patient sedated: no    Preparation: Aseptic Technique.  Patient position: sitting  Needle size: 27 G  Location technique: anatomical landmarks  Local Anesthetic: bupivacaine 0.25% without epinephrine  Anesthetic total: 3 mL  Patient tolerance: Patient tolerated the procedure well with no immediate complications

## 2023-12-07 ENCOUNTER — TELEPHONE (OUTPATIENT)
Dept: CARDIOLOGY | Facility: CLINIC | Age: 43
End: 2023-12-07
Payer: COMMERCIAL

## 2023-12-07 ENCOUNTER — HOSPITAL ENCOUNTER (OUTPATIENT)
Facility: HOSPITAL | Age: 43
Discharge: HOME | End: 2023-12-07
Payer: COMMERCIAL

## 2023-12-07 DIAGNOSIS — R55 SYNCOPE AND COLLAPSE: ICD-10-CM

## 2023-12-07 LAB
BH CV XLRA MEAS LEFT CAROTID BULB EDV: 23.7 CM/SEC
BH CV XLRA MEAS LEFT CAROTID BULB PSV: 69.3 CM/SEC
BH CV XLRA MEAS LEFT DIST CCA EDV: 21.1 CM/SEC
BH CV XLRA MEAS LEFT DIST CCA PSV: 76.9 CM/SEC
BH CV XLRA MEAS LEFT DIST ICA EDV: -24.4 CM/SEC
BH CV XLRA MEAS LEFT DIST ICA PSV: -56.2 CM/SEC
BH CV XLRA MEAS LEFT ICA/CCA RATIO: -0.77
BH CV XLRA MEAS LEFT MID ICA EDV: -30.8 CM/SEC
BH CV XLRA MEAS LEFT MID ICA PSV: -73.3 CM/SEC
BH CV XLRA MEAS LEFT PROX CCA EDV: 23.7 CM/SEC
BH CV XLRA MEAS LEFT PROX CCA PSV: 114.1 CM/SEC
BH CV XLRA MEAS LEFT PROX ECA EDV: -11.3 CM/SEC
BH CV XLRA MEAS LEFT PROX ECA PSV: -73.5 CM/SEC
BH CV XLRA MEAS LEFT PROX ICA EDV: -21.9 CM/SEC
BH CV XLRA MEAS LEFT PROX ICA PSV: -59.3 CM/SEC
BH CV XLRA MEAS LEFT VERTEBRAL A EDV: 14.2 CM/SEC
BH CV XLRA MEAS LEFT VERTEBRAL A PSV: 49.2 CM/SEC
BH CV XLRA MEAS RIGHT CAROTID BULB EDV: 17 CM/SEC
BH CV XLRA MEAS RIGHT CAROTID BULB PSV: 68.1 CM/SEC
BH CV XLRA MEAS RIGHT DIST CCA EDV: 22.7 CM/SEC
BH CV XLRA MEAS RIGHT DIST CCA PSV: 94.6 CM/SEC
BH CV XLRA MEAS RIGHT DIST ICA EDV: -28 CM/SEC
BH CV XLRA MEAS RIGHT DIST ICA PSV: -76.4 CM/SEC
BH CV XLRA MEAS RIGHT ICA/CCA RATIO: -0.75
BH CV XLRA MEAS RIGHT MID ICA EDV: -29.6 CM/SEC
BH CV XLRA MEAS RIGHT MID ICA PSV: -76.9 CM/SEC
BH CV XLRA MEAS RIGHT PROX CCA EDV: 21.2 CM/SEC
BH CV XLRA MEAS RIGHT PROX CCA PSV: 112.1 CM/SEC
BH CV XLRA MEAS RIGHT PROX ECA EDV: -11 CM/SEC
BH CV XLRA MEAS RIGHT PROX ECA PSV: -91.3 CM/SEC
BH CV XLRA MEAS RIGHT PROX ICA EDV: -22.8 CM/SEC
BH CV XLRA MEAS RIGHT PROX ICA PSV: -71 CM/SEC
BH CV XLRA MEAS RIGHT VERTEBRAL A EDV: 18.5 CM/SEC
BH CV XLRA MEAS RIGHT VERTEBRAL A PSV: 58.3 CM/SEC

## 2023-12-07 PROCEDURE — 93880 EXTRACRANIAL BILAT STUDY: CPT

## 2023-12-07 NOTE — TELEPHONE ENCOUNTER
----- Message from NICOLE Strickland sent at 12/7/2023  4:08 PM EST -----  Duplex carotid study:  ·  Right internal carotid artery demonstrates normal flow without evidence of hemodynamically significant stenosis. All other right side carotid system vessels are normal.  ·  Left internal carotid artery demonstrates normal flow without evidence of hemodynamically significant stenosis. All other left side carotid system vessels are normal.  Follow up as scheduled

## 2023-12-27 ENCOUNTER — OFFICE VISIT (OUTPATIENT)
Dept: PODIATRY | Facility: CLINIC | Age: 43
End: 2023-12-27
Payer: COMMERCIAL

## 2023-12-27 VITALS
SYSTOLIC BLOOD PRESSURE: 110 MMHG | BODY MASS INDEX: 23.87 KG/M2 | HEART RATE: 74 BPM | TEMPERATURE: 98.3 F | WEIGHT: 176 LBS | OXYGEN SATURATION: 98 % | DIASTOLIC BLOOD PRESSURE: 77 MMHG

## 2023-12-27 DIAGNOSIS — L60.0 ONYCHOCRYPTOSIS: Primary | ICD-10-CM

## 2023-12-27 DIAGNOSIS — L03.032 PARONYCHIA OF TOE OF LEFT FOOT: ICD-10-CM

## 2023-12-27 NOTE — PROGRESS NOTES
[facility] - PODIATRY    Today's Date: 12/27/23    Patient Name: Timi Hernandez  MRN: [id number]  CSN: [id number]  PCP: Clemencia Salas APRN  Referring Provider: No ref. provider found    SUBJECTIVE     Chief Complaint   Patient presents with    Left Foot - Follow-up, Ingrown Toenail     HPI: Timi Hernandez, a 43 y.o.male, comes to clinic.    New, Established, New Problem: New problem  Location: Lateral border of left great toe  Duration:   Greater than 1 week  Onset:  Gradual  Nature:  sore with palpation.  Aggravating factors:  Pain with shoe gear and ambulation.    No other pedal complaints at this time.    Patient denies any fevers, chills, nausea, vomiting, shortness of breath, nor any other constitutional signs nor symptoms.       Past Medical History:   Diagnosis Date    ADHD (attention deficit hyperactivity disorder) 1992    Anxiety     Arthritis     Brugada syndrome 11/16/2021    Chronic allergic rhinitis     Depression 2004    Diverticulosis 2019    Essential hypertension     Family history of colon cancer in mother     Fatty liver     GERD (gastroesophageal reflux disease)     Gout     Headache     Hepatomegaly     HL (hearing loss) 2004    Hyperlipidemia     IBS (irritable bowel syndrome)     Ingrown toenail     Kidney stones     Low back pain 2022    Lyme disease 2021    Plantar fasciitis     Right bundle branch block (RBBB), 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)     Syncope      Past Surgical History:   Procedure Laterality Date    ADENOIDECTOMY  1993    CARDIAC CATHETERIZATION  2022    CARDIAC ELECTROPHYSIOLOGY PROCEDURE N/A 02/03/2022    Procedure: EPS + Procainimide Challenge +/- SICD (BSC), no meds to hold;  Surgeon: Lang Sorensen MD;  Location: [facility] INVASIVE LOC[facility];  Service: Cardiology;  Laterality: N/A;    CHOLECYSTECTOMY  2017    Dr. Ray    COLONOSCOPY  2019    EYE SURGERY  2008    GALLBLADDER SURGERY      HERNIA REPAIR       INGUINAL HERNIA REPAIR  1984    LASIK      TOE SURGERY      TONSILLECTOMY      VASECTOMY       Family History   Problem Relation Age of Onset    Heart failure Mother         Heart attack in her early 50s    Hyperlipidemia Mother     Heart attack Mother     Hypertension Mother     Colon cancer Mother 53    Stroke Mother     Alcohol abuse Mother     Anxiety disorder Mother     Cancer Mother         colon    Developmental Disability Mother     Heart disease Father     Alcohol abuse Father     Heart disease Sister     Lung cancer Maternal Uncle         50s    Heart disease Maternal Uncle     Alcohol abuse Maternal Uncle     Stroke Maternal Grandfather     Heart disease Maternal Grandfather     Heart attack Other     Heart disease Other     Heart failure Other     Hypertension Other     Hyperlipidemia Other      Social History     Socioeconomic History    Marital status:    Tobacco Use    Smoking status: Never    Smokeless tobacco: Never    Tobacco comments:     second hand smoke exposure-never   Vaping Use    Vaping Use: Never used   Substance and Sexual Activity    Alcohol use: Not Currently    Drug use: Never    Sexual activity: Yes     Partners: Female     Birth control/protection: Surgical, Vasectomy     Comment: Vasectomy     Allergies   Allergen Reactions    Penicillins Anaphylaxis    Sulfamethoxazole-Trimethoprim Anaphylaxis and Rash     Other reaction(s): sore throat and ulcers    Colchicine Rash     PT stated body was burning     Current Outpatient Medications   Medication Sig Dispense Refill    aspirin 81 MG EC tablet Take 1 tablet by mouth Daily.      cetirizine (zyrTEC) 10 MG tablet Zyrtec 10 mg oral tablet take 1 tablet (10 mg) by oral route once daily   Active      clonazePAM (KlonoPIN) 1 MG tablet Take 1 tablet by mouth As Needed.      cyclobenzaprine (FLEXERIL) 10 MG tablet Take 1 tablet by mouth Every Night. 30 tablet 3    fenofibric acid (TRILIPIX) 135 MG capsule delayed-release delayed  release capsule TAKE 1 CAPSULE DAILY 90 capsule 3    hyoscyamine (LEVSIN) 0.125 MG SL tablet PLACE 1 TABLET UNDER THE TONGUE EVERY 4-6 HOURS AS NEEDED FOR DIARRHEA/ABDOMINAL PAIN 270 tablet 1    Ibuprofen 3 %, Gabapentin 10 %, Baclofen 2 %, lidocaine 4 %, Ketamine HCl 4 % Apply 1-2 g topically to the appropriate area as directed 3 (Three) to 4 (Four) times daily. 90 g 0    Lovaza 1 g capsule Take 2 capsules by mouth 2 (Two) Times a Day. 360 capsule 3    niacin (NIASPAN) 500 MG CR tablet TAKE 1 TABLET EVERY NIGHT 90 tablet 3    pantoprazole (PROTONIX) 40 MG EC tablet       Semaglutide-Weight Management (Wegovy) 1 MG/0.5ML solution auto-injector Inject  under the skin into the appropriate area as directed.      tiZANidine (ZANAFLEX) 4 MG tablet Take 1 tablet by mouth every night at bedtime.      Vyvanse 30 MG capsule Take 1 capsule by mouth Daily      Wegovy 2.4 MG/0.75ML solution auto-injector        No current facility-administered medications for this visit.     Review of Systems   Constitutional: Negative.    Skin:         Painful Left in-grown toenail   All other systems reviewed and are negative.      OBJECTIVE     Vitals:    12/27/23 0730   BP: 110/77   Pulse: 74   Temp: 98.3 °F (36.8 °C)   SpO2: 98%       PHYSICAL EXAM  GEN:      Foot/Ankle Exam    GENERAL  Appearance:  appears stated age  Orientation:  AAOx3  Affect:  appropriate  Gait:  unimpaired  Assistance:  independent  Right shoe gear: casual shoe  Left shoe gear: casual shoe    VASCULAR     Right Foot Vascularity   Normal vascular exam    Dorsalis pedis:  2+  Posterior tibial:  2+  Skin temperature:  warm  Edema grading:  None  CFT:  < 3 seconds  Pedal hair growth:  Present  Varicosities:  none     Left Foot Vascularity   Normal vascular exam    Dorsalis pedis:  2+  Posterior tibial:  2+  Skin temperature:  warm  Edema grading:  None  CFT:  < 3 seconds  Pedal hair growth:  Present  Varicosities:  none     NEUROLOGIC     Right Foot Neurologic   Normal  sensation    Light touch sensation: normal  Vibratory sensation: normal  Hot/Cold sensation: normal     Left Foot Neurologic   Normal sensation    Light touch sensation: normal  Vibratory sensation: normal  Hot/Cold sensation:  normal    MUSCULOSKELETAL     Left Foot Musculoskeletal   Tenderness:  toe 1 tenderness    MUSCLE STRENGTH     Right Foot Muscle Strength   Foot dorsiflexion:  4  Foot plantar flexion:  4  Foot inversion:  4  Foot eversion:  4     Left Foot Muscle Strength   Foot dorsiflexion:  4  Foot plantar flexion:  4  Foot inversion:  4  Foot eversion:  4    RANGE OF MOTION     Right Foot Range of Motion   Foot and ankle ROM within normal limits       Left Foot Range of Motion   Foot and ankle ROM within normal limits      DERMATOLOGIC      Right Foot Dermatologic   Skin  Right foot skin is intact.      Left Foot Dermatologic   Skin  Left foot skin is intact.   Nails comment:  Left 1st lateral nail border  Nails  1.  Positive for ingrown toenail. (Lateral nail border)    ASSESSMENT/PLAN     Diagnoses and all orders for this visit:    1. Onychocryptosis (Primary)    2. Paronychia of toe of left foot      Comprehensive lower extremity examination and evaluation was performed.    Discussed findings and treatment plan including risks, benefits, and treatment options with patient in detail. Patient agreed with treatment plan.    Phenol and Alcohol Chemical Matrixectomy Procedure - This procedure is indicated for onychocryptosis of the left first lateral nail border(s). Indications, risks and benefits and alternative treatments have been discussed with this patient who has agreed to this procedure. The area was sterilely prepped with a povidone-iodine solution. The affected area was locally anesthetized with 3 ml, of 0.5% Marcaine plain. The offending nail plate was completely excised.  Next 3 applications of 89% phenol were applied to the matrix area x 30 seconds followed by irrigation with copious amounts of  isopropyl alcohol.  A sterile dressing was applied. The patient tolerated the procedure well.     Patient was given post procedure care instructions.  The patient states understanding and agreement with this plan.    An After Visit Summary was printed and given to the patient at discharge, including (if requested) any available informative/educational handouts regarding diagnosis, treatment, or medications. All questions were answered to patient/family satisfaction. Should symptoms fail to improve or worsen they agree to call or return to clinic or to go to the Emergency Department. Discussed the importance of following up with any needed screening tests/labs/specialist appointments and any requested follow-up recommended by me today. Importance of maintaining follow-up discussed and patient accepts that missed appointments can delay diagnosis and potentially lead to worsening of conditions.    Return in about 2 weeks (around 1/10/2024) for Post-Procedure., or sooner if acute issues arise.    This document has been electronically signed by Billy Weeks DPM on December 27, 2023 07:43 EST

## 2024-01-03 ENCOUNTER — PROCEDURE VISIT (OUTPATIENT)
Dept: NEUROLOGY | Facility: CLINIC | Age: 44
End: 2024-01-03
Payer: COMMERCIAL

## 2024-01-03 DIAGNOSIS — M54.81 OCCIPITAL NEURALGIA OF LEFT SIDE: ICD-10-CM

## 2024-01-03 DIAGNOSIS — M54.2 CERVICALGIA: Primary | ICD-10-CM

## 2024-01-03 RX ORDER — BUPIVACAINE HYDROCHLORIDE 2.5 MG/ML
10 INJECTION, SOLUTION INFILTRATION; PERINEURAL ONCE
Status: COMPLETED | OUTPATIENT
Start: 2024-01-03 | End: 2024-01-03

## 2024-01-03 RX ADMIN — BUPIVACAINE HYDROCHLORIDE 10 ML: 2.5 INJECTION, SOLUTION INFILTRATION; PERINEURAL at 16:37

## 2024-01-04 RX ORDER — MIDODRINE HYDROCHLORIDE 2.5 MG/1
2.5 TABLET ORAL NIGHTLY
Qty: 14 TABLET | Refills: 0 | Status: SHIPPED | OUTPATIENT
Start: 2024-01-04

## 2024-01-05 NOTE — PROGRESS NOTES
Procedure   Inject Trigger Points, > 3    Date/Time: 1/3/2024 4:05 PM    Performed by: Halie Rodríguez APRN  Authorized by: Halie Rodríguez APRN  Local anesthesia used: yes  Anesthesia: local infiltration    Anesthesia:  Local anesthesia used: yes  Local Anesthetic: bupivacaine 0.25% without epinephrine  Anesthetic total: 10 mL    Sedation:  Patient sedated: no    Patient tolerance: patient tolerated the procedure well with no immediate complications  Comments: Injected trigger points to bilateral cervical paraspinal and trapezius muscles       CRBTPI    Date/Time: 1/3/2024 4:05 PM    Performed by: Halie Rodríguez APRN  Authorized by: Halie Rodríguez APRN  Indications: pain relief  Body area: head (Greater and Lesser occipital nerve)  Nerve: greater occipital  Laterality: Bilateral.    Sedation:  Patient sedated: no    Preparation: Aseptic Technique.  Patient position: sitting  Needle size: 27 G  Location technique: anatomical landmarks  Local Anesthetic: bupivacaine 0.25% without epinephrine  Anesthetic total: 3 mL  Patient tolerance: Patient tolerated the procedure well with no immediate complications

## 2024-01-08 ENCOUNTER — PATIENT MESSAGE (OUTPATIENT)
Dept: CARDIOLOGY | Facility: CLINIC | Age: 44
End: 2024-01-08
Payer: COMMERCIAL

## 2024-01-09 ENCOUNTER — TELEPHONE (OUTPATIENT)
Dept: CARDIOLOGY | Facility: CLINIC | Age: 44
End: 2024-01-09
Payer: COMMERCIAL

## 2024-01-09 NOTE — TELEPHONE ENCOUNTER
"----- Message from Catrachito Pizarro RN sent at 1/9/2024  8:55 AM EST -----  Regarding: FW: Heart  Contact: [phone]    ----- Message -----  From: Mary Timi RADHA \"Dontrell\"  Sent: 1/8/2024   8:02 PM EST  To: [facility]  Subject: Heart                                            My heart rate was doing some crazy stuff this weekend on my Apple watch. Did you all see anything concerning in your end from the elma that’s in my chest?    Dontrell Hernandez  [phone]      Called to discuss home monitor reading. No answer and left message for a return antonio.  1 episode of SVT/AT for 6 hrs with average rate 127 bpm on 1/7/24.  Will submit reading in [facility] for Dr Sorensen to review.     Mr Hernandez  returned my call and he said he just didn't feel right and had SOA on 1/7/24.  He did exercise for 1 hr on elliptical that day.  He reports he is on Wegovy and is down to 190 lbs.  He also said at times he bends over and stands back up he feels he is going to pass out.   He drinks lots of water throughout the day and will drink 1 liquid IV daily.      "

## 2024-01-10 NOTE — TELEPHONE ENCOUNTER
Dr. Sorensen reviewed his remote monitor and thinks rhythms are consistent with sinus tachycardia. No changes for now.

## 2024-01-12 ENCOUNTER — OFFICE VISIT (OUTPATIENT)
Dept: PODIATRY | Facility: CLINIC | Age: 44
End: 2024-01-12
Payer: COMMERCIAL

## 2024-01-12 VITALS
HEART RATE: 84 BPM | SYSTOLIC BLOOD PRESSURE: 120 MMHG | HEIGHT: 72 IN | TEMPERATURE: 98.9 F | DIASTOLIC BLOOD PRESSURE: 79 MMHG | OXYGEN SATURATION: 96 % | WEIGHT: 182 LBS | BODY MASS INDEX: 24.65 KG/M2

## 2024-01-12 DIAGNOSIS — M79.672 FOOT PAIN, LEFT: ICD-10-CM

## 2024-01-12 DIAGNOSIS — L60.0 ONYCHOCRYPTOSIS: Primary | ICD-10-CM

## 2024-01-12 DIAGNOSIS — L03.032 PARONYCHIA OF TOE OF LEFT FOOT: ICD-10-CM

## 2024-01-12 RX ORDER — DULOXETIN HYDROCHLORIDE 30 MG/1
CAPSULE, DELAYED RELEASE ORAL
COMMUNITY
Start: 2024-01-11

## 2024-01-12 RX ORDER — LISDEXAMFETAMINE DIMESYLATE CAPSULES 40 MG/1
1 CAPSULE ORAL DAILY
COMMUNITY
Start: 2023-12-29

## 2024-01-12 NOTE — PROGRESS NOTES
[facility] - PODIATRY    Today's Date: 01/12/24    Patient Name: Timi Hernandez  MRN: [id number]  CSN: [id number]  PCP: Clemencia Salas APRN  Referring Provider: No ref. provider found    SUBJECTIVE     Chief Complaint   Patient presents with    Left Foot - Follow-up, Ingrown Toenail     HPI: Timi Hernandez, a 43 y.o.male, comes to clinic.    New, Established, New Problem:est  Location: Lateral border of left great toe  Duration:   Greater than 1 week  Onset:  Gradual  Nature:  sore with palpation.  Aggravating factors: Chemical matrixectomy    No other pedal complaints at this time.    Patient denies any fevers, chills, nausea, vomiting, shortness of breath, nor any other constitutional signs nor symptoms.       Past Medical History:   Diagnosis Date    ADHD (attention deficit hyperactivity disorder) 1992    Anxiety     Arthritis     Brugada syndrome 11/16/2021    Chronic allergic rhinitis     Depression 2004    Diverticulosis 2019    Essential hypertension     Family history of colon cancer in mother     Fatty liver     GERD (gastroesophageal reflux disease)     Gout     Headache     Hepatomegaly     HL (hearing loss) 2004    Hyperlipidemia     IBS (irritable bowel syndrome)     Ingrown toenail     Kidney stones     Low back pain 2022    Lyme disease 2021    Plantar fasciitis     Right bundle branch block (RBBB), 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 Ray    COLONOSCOPY  2019    EYE SURGERY  2008    GALLBLADDER SURGERY      HERNIA REPAIR      INGUINAL HERNIA  REPAIR  1984    LASIK      TOE SURGERY      TONSILLECTOMY      VASECTOMY       Family History   Problem Relation Age of Onset    Heart failure Mother         Heart attack in her early 50s    Hyperlipidemia Mother     Heart attack Mother     Hypertension Mother     Colon cancer Mother 53    Stroke Mother     Alcohol abuse Mother     Anxiety disorder Mother     Cancer Mother         colon    Developmental Disability Mother     Heart disease Father     Alcohol abuse Father     Heart disease Sister     Lung cancer Maternal Uncle         50s    Heart disease Maternal Uncle     Alcohol abuse Maternal Uncle     Stroke Maternal Grandfather     Heart disease Maternal Grandfather     Heart attack Other     Heart disease Other     Heart failure Other     Hypertension Other     Hyperlipidemia Other      Social History     Socioeconomic History    Marital status:    Tobacco Use    Smoking status: Never    Smokeless tobacco: Never    Tobacco comments:     second hand smoke exposure-never   Vaping Use    Vaping Use: Never used   Substance and Sexual Activity    Alcohol use: Not Currently    Drug use: Never    Sexual activity: Yes     Partners: Female     Birth control/protection: Surgical, Vasectomy     Comment: Vasectomy     Allergies   Allergen Reactions    Penicillins Anaphylaxis    Sulfamethoxazole-Trimethoprim Anaphylaxis and Rash     Other reaction(s): sore throat and ulcers    Colchicine Rash     PT stated body was burning     Current Outpatient Medications   Medication Sig Dispense Refill    DULoxetine (CYMBALTA) 30 MG capsule       lisdexamfetamine (VYVANSE) 40 MG capsule Take 1 capsule by mouth Daily      aspirin 81 MG EC tablet Take 1 tablet by mouth Daily.      cetirizine (zyrTEC) 10 MG tablet Zyrtec 10 mg oral tablet take 1 tablet (10 mg) by oral route once daily   Active      clonazePAM (KlonoPIN) 1 MG tablet Take 1 tablet by mouth As Needed.      cyclobenzaprine (FLEXERIL) 10 MG tablet Take 1 tablet by mouth  Every Night. 30 tablet 3    fenofibric acid (TRILIPIX) 135 MG capsule delayed-release delayed release capsule TAKE 1 CAPSULE DAILY 90 capsule 3    hyoscyamine (LEVSIN) 0.125 MG SL tablet PLACE 1 TABLET UNDER THE TONGUE EVERY 4-6 HOURS AS NEEDED FOR DIARRHEA/ABDOMINAL PAIN 270 tablet 1    Ibuprofen 3 %, Gabapentin 10 %, Baclofen 2 %, lidocaine 4 %, Ketamine HCl 4 % Apply 1-2 g topically to the appropriate area as directed 3 (Three) to 4 (Four) times daily. 90 g 0    Lovaza 1 g capsule Take 2 capsules by mouth 2 (Two) Times a Day. 360 capsule 3    midodrine (PROAMATINE) 2.5 MG tablet Take 1 tablet by mouth Every Night. 14 tablet 0    niacin (NIASPAN) 500 MG CR tablet TAKE 1 TABLET EVERY NIGHT 90 tablet 3    pantoprazole (PROTONIX) 40 MG EC tablet       Semaglutide-Weight Management (Wegovy) 1 MG/0.5ML solution auto-injector Inject  under the skin into the appropriate area as directed.      tiZANidine (ZANAFLEX) 4 MG tablet Take 1 tablet by mouth every night at bedtime.      Wegovy 2.4 MG/0.75ML solution auto-injector        No current facility-administered medications for this visit.     Review of Systems   Constitutional: Negative.    Skin:         F/U 2 point painful Left in-grown toenail   All other systems reviewed and are negative.      OBJECTIVE     Vitals:    01/12/24 0858   BP: 120/79   Pulse: 84   Temp: 98.9 °F (37.2 °C)   SpO2: 96%       PHYSICAL EXAM  GEN:      Foot/Ankle Exam    GENERAL  Appearance:  appears stated age  Orientation:  AAOx3  Affect:  appropriate  Gait:  unimpaired  Assistance:  independent  Right shoe gear: casual shoe  Left shoe gear: casual shoe    VASCULAR     Right Foot Vascularity   Normal vascular exam    Dorsalis pedis:  2+  Posterior tibial:  2+  Skin temperature:  warm  Edema grading:  None  CFT:  < 3 seconds  Pedal hair growth:  Present  Varicosities:  none     Left Foot Vascularity   Normal vascular exam    Dorsalis pedis:  2+  Posterior tibial:  2+  Skin temperature:   warm  Edema grading:  None  CFT:  < 3 seconds  Pedal hair growth:  Present  Varicosities:  none     NEUROLOGIC     Right Foot Neurologic   Normal sensation    Light touch sensation: normal  Vibratory sensation: normal  Hot/Cold sensation: normal     Left Foot Neurologic   Normal sensation    Light touch sensation: normal  Vibratory sensation: normal  Hot/Cold sensation:  normal    MUSCULOSKELETAL     Left Foot Musculoskeletal   Tenderness:  toe 1 tenderness    MUSCLE STRENGTH     Right Foot Muscle Strength   Foot dorsiflexion:  4  Foot plantar flexion:  4  Foot inversion:  4  Foot eversion:  4     Left Foot Muscle Strength   Foot dorsiflexion:  4  Foot plantar flexion:  4  Foot inversion:  4  Foot eversion:  4    RANGE OF MOTION     Right Foot Range of Motion   Foot and ankle ROM within normal limits       Left Foot Range of Motion   Foot and ankle ROM within normal limits      DERMATOLOGIC      Right Foot Dermatologic   Skin  Right foot skin is intact.      Left Foot Dermatologic   Skin  Left foot skin is intact.   Nails comment:  Left 1st lateral nail border  Nails  1.  Negative for ingrown toenail and paronychia. (Lateral nail border; healing without complications.)    ASSESSMENT/PLAN     Diagnoses and all orders for this visit:    1. Onychocryptosis (Primary)    2. Paronychia of toe of left foot    3. Foot pain, left      Comprehensive lower extremity examination and evaluation was performed.    Discussed findings and treatment plan including risks, benefits, and treatment options with patient in detail. Patient agreed with treatment plan.    Patient is to monitor for recurrence and any new symptoms and to contact Dr. Weeks's office for a follow-up appointment.      The patient states understanding and agreement with this plan.    An After Visit Summary was printed and given to the patient at discharge, including (if requested) any available informative/educational handouts regarding diagnosis, treatment, or  medications. All questions were answered to patient/family satisfaction. Should symptoms fail to improve or worsen they agree to call or return to clinic or to go to the Emergency Department. Discussed the importance of following up with any needed screening tests/labs/specialist appointments and any requested follow-up recommended by me today. Importance of maintaining follow-up discussed and patient accepts that missed appointments can delay diagnosis and potentially lead to worsening of conditions.    Return if symptoms worsen or fail to improve., or sooner if acute issues arise.    This document has been electronically signed by Billy Weeks DPM on January 12, 2024 09:33 EST

## 2024-01-17 ENCOUNTER — PROCEDURE VISIT (OUTPATIENT)
Dept: NEUROLOGY | Facility: CLINIC | Age: 44
End: 2024-01-17
Payer: COMMERCIAL

## 2024-01-17 DIAGNOSIS — M54.2 CERVICALGIA: Primary | ICD-10-CM

## 2024-01-17 DIAGNOSIS — M54.81 OCCIPITAL NEURALGIA OF LEFT SIDE: ICD-10-CM

## 2024-01-17 RX ORDER — BUPIVACAINE HYDROCHLORIDE 2.5 MG/ML
10 INJECTION, SOLUTION INFILTRATION; PERINEURAL ONCE
Status: COMPLETED | OUTPATIENT
Start: 2024-01-17 | End: 2024-01-17

## 2024-01-17 RX ADMIN — BUPIVACAINE HYDROCHLORIDE 10 ML: 2.5 INJECTION, SOLUTION INFILTRATION; PERINEURAL at 16:14

## 2024-01-19 NOTE — PROGRESS NOTES
Procedure   Inject Trigger Points, > 3    Date/Time: 1/17/2024 4:00 PM    Performed by: Halie Rodríguez APRN  Authorized by: Halie Rodríguez APRN  Local anesthesia used: yes  Anesthesia: local infiltration    Anesthesia:  Local anesthesia used: yes  Local Anesthetic: bupivacaine 0.25% without epinephrine  Anesthetic total: 8 mL    Sedation:  Patient sedated: no    Patient tolerance: patient tolerated the procedure well with no immediate complications  Comments: Injected trigger points to bilateral cervical paraspinal and trapezius muscles       CRBTPI    Date/Time: 1/17/2024 4:00 PM    Performed by: Halie Rodríguez APRN  Authorized by: Halie Rodríguez APRN  Indications: pain relief  Body area: head (Greater and Lesser occipital nerve)  Nerve: greater occipital  Laterality: left    Sedation:  Patient sedated: no    Preparation: Aseptic Technique.  Patient position: sitting  Needle size: 27 G  Location technique: anatomical landmarks  Local Anesthetic: bupivacaine 0.25% without epinephrine  Anesthetic total: 2 (2mL per injection for total of 8mL) mL  Patient tolerance: Patient tolerated the procedure well with no immediate complications

## 2024-01-22 ENCOUNTER — OFFICE VISIT (OUTPATIENT)
Dept: FAMILY MEDICINE CLINIC | Facility: CLINIC | Age: 44
End: 2024-01-22
Payer: COMMERCIAL

## 2024-01-22 VITALS
SYSTOLIC BLOOD PRESSURE: 110 MMHG | HEART RATE: 75 BPM | HEIGHT: 72 IN | WEIGHT: 195.3 LBS | DIASTOLIC BLOOD PRESSURE: 70 MMHG | OXYGEN SATURATION: 99 % | BODY MASS INDEX: 26.45 KG/M2 | TEMPERATURE: 97.7 F

## 2024-01-22 DIAGNOSIS — J02.9 SORE THROAT: ICD-10-CM

## 2024-01-22 DIAGNOSIS — B34.9 VIRAL ILLNESS: Primary | ICD-10-CM

## 2024-01-22 DIAGNOSIS — R05.9 COUGH, UNSPECIFIED TYPE: ICD-10-CM

## 2024-01-22 LAB
EXPIRATION DATE: NORMAL
FLUAV AG UPPER RESP QL IA.RAPID: NOT DETECTED
FLUBV AG UPPER RESP QL IA.RAPID: NOT DETECTED
INTERNAL CONTROL: NORMAL
Lab: NORMAL
SARS-COV-2 AG UPPER RESP QL IA.RAPID: NOT DETECTED

## 2024-01-22 PROCEDURE — 87428 SARSCOV & INF VIR A&B AG IA: CPT

## 2024-01-22 PROCEDURE — 99213 OFFICE O/P EST LOW 20 MIN: CPT

## 2024-01-22 RX ORDER — PREDNISONE 50 MG/1
50 TABLET ORAL DAILY
Qty: 5 TABLET | Refills: 0 | Status: SHIPPED | OUTPATIENT
Start: 2024-01-22

## 2024-01-22 NOTE — PROGRESS NOTES
"Timi Hernandez presents to [facility] with complaints of intermittent fever, body aches, sinus congestion, nasal drainage, diarrhea.      History of Present Illness  This is a 43-year-old male who presents to the clinic with complaints of intermittent fever, body aches, sinus congestion, nasal drainage, and diarrhea.    Patient's symptoms started about yesterday, started with some bodyaches, headache that just would not go away, then progressed to some nasal drainage and congestion, and then today he woke up and just did not feel good.  Extremely fatigued.  Did have a bout of diarrhea.  Denies any cough, no chest congestion no shortness of breath or chest pain.  States he is try to get ahead of this, wants to make sure that he is not contagious before going back to work as well.  Denies any ear fullness or pain.    The following portions of the patient's history were personally reviewed and updated as appropriate: allergies, current medications, past medical history, past surgical history, past family history, and past social history.       Objective   Vital Signs:   /70 (BP Location: Left arm, Patient Position: Sitting, Cuff Size: Adult)   Pulse 75   Temp 97.7 °F (36.5 °C)   Ht 182.9 cm (72\")   Wt 88.6 kg (195 lb 4.8 oz)   SpO2 99%   BMI 26.49 kg/m²     Body mass index is 26.49 kg/m².    All labs, imaging, test results, and specialty provider notes reviewed with patient.     Physical Exam  Vitals reviewed.   Constitutional:       Appearance: Normal appearance.   Cardiovascular:      Rate and Rhythm: Normal rate and regular rhythm.      Pulses: Normal pulses.      Heart sounds: Normal heart sounds.   Pulmonary:      Effort: Pulmonary effort is normal.      Breath sounds: Normal breath sounds.   Neurological:      General: No focal deficit present.      Mental Status: He is alert and oriented to person, place, and time.                 Assessment and Plan:  Diagnoses " and all orders for this visit:    1. Viral illness (Primary)  -     predniSONE (DELTASONE) 50 MG tablet; Take 1 tablet by mouth Daily.  Dispense: 5 tablet; Refill: 0    2. Cough, unspecified type  -     POCT SARS-CoV-2 + Flu Antigen YANCI  -     predniSONE (DELTASONE) 50 MG tablet; Take 1 tablet by mouth Daily.  Dispense: 5 tablet; Refill: 0    3. Sore throat  -     POCT SARS-CoV-2 + Flu Antigen YANCI  -     Cancel: POC Rapid Strep A  -     predniSONE (DELTASONE) 50 MG tablet; Take 1 tablet by mouth Daily.  Dispense: 5 tablet; Refill: 0      At this point, think patient may have some form of a viral illness.  His COVID and flu test in the office today was negative.  Will provide patient with 5-day course of prednisone to take once daily.  Continue to alternate ibuprofen and Tylenol for fever suppression.  May take decongestants over-the-counter as well.  Patient to follow-up or let myself know if things are not improving or worsen.    Follow Up:  No follow-ups on file.    Patient was given instructions and counseling regarding his condition or for health maintenance advice. Please see specific information pulled into the AVS if appropriate.

## 2024-01-31 ENCOUNTER — OFFICE VISIT (OUTPATIENT)
Dept: NEUROLOGY | Facility: CLINIC | Age: 44
End: 2024-01-31
Payer: COMMERCIAL

## 2024-01-31 VITALS
BODY MASS INDEX: 26.24 KG/M2 | HEART RATE: 94 BPM | HEIGHT: 72 IN | DIASTOLIC BLOOD PRESSURE: 78 MMHG | SYSTOLIC BLOOD PRESSURE: 123 MMHG | WEIGHT: 193.7 LBS

## 2024-01-31 DIAGNOSIS — R74.8 ELEVATED CK: ICD-10-CM

## 2024-01-31 DIAGNOSIS — M54.81 OCCIPITAL NEURALGIA OF LEFT SIDE: ICD-10-CM

## 2024-01-31 DIAGNOSIS — M54.2 CERVICALGIA: Primary | ICD-10-CM

## 2024-01-31 RX ORDER — LISDEXAMFETAMINE DIMESYLATE 30 MG/1
1 CAPSULE ORAL DAILY
COMMUNITY
Start: 2024-01-25

## 2024-01-31 RX ORDER — BUPIVACAINE HYDROCHLORIDE 2.5 MG/ML
10 INJECTION, SOLUTION INFILTRATION; PERINEURAL ONCE
Status: COMPLETED | OUTPATIENT
Start: 2024-01-31 | End: 2024-01-31

## 2024-01-31 RX ADMIN — BUPIVACAINE HYDROCHLORIDE 10 ML: 2.5 INJECTION, SOLUTION INFILTRATION; PERINEURAL at 16:22
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
## 2024-02-01 ENCOUNTER — OFFICE VISIT (OUTPATIENT)
Dept: NEUROSURGERY | Facility: CLINIC | Age: 44
End: 2024-02-01
Payer: COMMERCIAL

## 2024-02-01 VITALS — HEIGHT: 72 IN | BODY MASS INDEX: 26.03 KG/M2 | WEIGHT: 192.2 LBS

## 2024-02-01 DIAGNOSIS — G89.29 CHRONIC MIDLINE LOW BACK PAIN WITH LEFT-SIDED SCIATICA: ICD-10-CM

## 2024-02-01 DIAGNOSIS — S16.1XXD CERVICAL MYOFASCIAL STRAIN, SUBSEQUENT ENCOUNTER: Primary | ICD-10-CM

## 2024-02-01 DIAGNOSIS — M54.42 CHRONIC MIDLINE LOW BACK PAIN WITH LEFT-SIDED SCIATICA: ICD-10-CM

## 2024-02-01 DIAGNOSIS — M50.222 HERNIATED NUCLEUS PULPOSUS, C5-6 RIGHT: ICD-10-CM

## 2024-02-01 PROBLEM — R74.8 ELEVATED CK: Status: ACTIVE | Noted: 2024-02-01
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
## 2024-02-01 NOTE — PROGRESS NOTES
Procedure   Inject Trigger Points, > 3    Date/Time: 1/31/2024 4:00 PM    Performed by: Halie Rodríguez APRN  Authorized by: Halie Rodríguez APRN  Local anesthesia used: yes  Anesthesia: local infiltration    Anesthesia:  Local anesthesia used: yes  Local Anesthetic: bupivacaine 0.25% without epinephrine  Anesthetic total: 7 mL    Sedation:  Patient sedated: no    Patient tolerance: patient tolerated the procedure well with no immediate complications  Comments: Injected trigger points to bilateral cervical paraspinal and trapezius muscles       CRBTPI    Date/Time: 1/31/2024 4:00 PM    Performed by: Halie Rodríguez APRN  Authorized by: Halie Rodríguez APRN  Indications: pain relief  Body area: head (Greater and Lesser occipital nerve)  Nerve: greater occipital  Laterality: Bilateral.    Sedation:  Patient sedated: no    Preparation: Aseptic Technique.  Patient position: sitting  Needle size: 27 G  Location technique: anatomical landmarks  Local Anesthetic: bupivacaine 0.25% without epinephrine  Anesthetic total: 3 mL  Patient tolerance: Patient tolerated the procedure well with no immediate complications

## 2024-02-01 NOTE — ASSESSMENT & PLAN NOTE
Nightly tizanidine.  Will order repeat trigger point injections to bilateral cervical paraspinal and trapezius muscles.

## 2024-02-10 NOTE — PROGRESS NOTES
Procedure   Inject Trigger Points, > 3    Date/Time: 9/13/2023 4:29 PM  Performed by: Halie Rodríguez APRN  Authorized by: Halie Rodríguez APRN   Local anesthesia used: yes  Anesthesia: local infiltration    Anesthesia:  Local anesthesia used: yes  Local Anesthetic: bupivacaine 0.25% without epinephrine  Anesthetic total: 8 mL    Sedation:  Patient sedated: no    Patient tolerance: patient tolerated the procedure well with no immediate complications  Comments: Injected trigger points to bilateral cervical paraspinal and trapezius muscles       CRBTPI    Date/Time: 9/13/2023 4:30 PM  Performed by: Halie Rodríguez APRN  Authorized by: Halie Rodríguez APRN   Indications: pain relief  Body area: head (Greater and Lesser occipital nerve)  Nerve: greater occipital  Laterality: left    Sedation:  Patient sedated: no    Preparation: Aseptic Technique.  Patient position: sitting  Needle size: 27 G  Location technique: anatomical landmarks  Local Anesthetic: bupivacaine 0.25% without epinephrine  Anesthetic total: 2 (2mL per injection for total of 8mL) mL  Patient tolerance: Patient tolerated the procedure well with no immediate complications         
elev BP, elev creatinine, back pain

## 2024-02-15 ENCOUNTER — OFFICE VISIT (OUTPATIENT)
Dept: GASTROENTEROLOGY | Facility: CLINIC | Age: 44
End: 2024-02-15
Payer: COMMERCIAL

## 2024-02-15 VITALS
HEART RATE: 80 BPM | BODY MASS INDEX: 25.12 KG/M2 | DIASTOLIC BLOOD PRESSURE: 61 MMHG | WEIGHT: 185.2 LBS | SYSTOLIC BLOOD PRESSURE: 113 MMHG

## 2024-02-15 DIAGNOSIS — K76.0 FATTY LIVER: Primary | ICD-10-CM

## 2024-02-15 DIAGNOSIS — E66.3 OVERWEIGHT (BMI 25.0-29.9): ICD-10-CM

## 2024-02-15 DIAGNOSIS — R12 HEARTBURN: ICD-10-CM

## 2024-02-16 ENCOUNTER — PROCEDURE VISIT (OUTPATIENT)
Dept: NEUROLOGY | Facility: CLINIC | Age: 44
End: 2024-02-16
Payer: COMMERCIAL

## 2024-02-16 DIAGNOSIS — M54.2 CERVICALGIA: Primary | ICD-10-CM

## 2024-02-16 DIAGNOSIS — M54.81 OCCIPITAL NEURALGIA OF LEFT SIDE: ICD-10-CM

## 2024-02-16 RX ORDER — BUPIVACAINE HYDROCHLORIDE 2.5 MG/ML
10 INJECTION, SOLUTION INFILTRATION; PERINEURAL ONCE
Status: SHIPPED | OUTPATIENT
Start: 2024-02-16

## 2024-02-27 ENCOUNTER — LAB (OUTPATIENT)
Dept: LAB | Facility: HOSPITAL | Age: 44
End: 2024-02-27
Payer: COMMERCIAL

## 2024-02-27 DIAGNOSIS — M54.81 OCCIPITAL NEURALGIA OF LEFT SIDE: ICD-10-CM

## 2024-02-27 DIAGNOSIS — R74.8 ELEVATED CK: ICD-10-CM

## 2024-02-27 DIAGNOSIS — M54.2 CERVICALGIA: ICD-10-CM

## 2024-02-27 LAB
CK MB SERPL-CCNC: 1.81 NG/ML
FOLATE SERPL-MCNC: 6.85 NG/ML (ref 4.78–24.2)
VIT B12 BLD-MCNC: 512 PG/ML (ref 211–946)

## 2024-02-27 PROCEDURE — 36415 COLL VENOUS BLD VENIPUNCTURE: CPT

## 2024-02-27 PROCEDURE — 82553 CREATINE MB FRACTION: CPT

## 2024-02-27 PROCEDURE — 83921 ORGANIC ACID SINGLE QUANT: CPT

## 2024-02-27 PROCEDURE — 82746 ASSAY OF FOLIC ACID SERUM: CPT

## 2024-02-27 PROCEDURE — 82607 VITAMIN B-12: CPT

## 2024-03-06 LAB — METHYLMALONATE SERPL-SCNC: 181 NMOL/L (ref 0–378)

## 2024-03-26 ENCOUNTER — PROCEDURE VISIT (OUTPATIENT)
Dept: NEUROLOGY | Facility: CLINIC | Age: 44
End: 2024-03-26
Payer: COMMERCIAL

## 2024-03-26 DIAGNOSIS — M54.2 CERVICALGIA: Primary | ICD-10-CM

## 2024-03-26 DIAGNOSIS — M54.81 OCCIPITAL NEURALGIA OF LEFT SIDE: ICD-10-CM

## 2024-03-26 RX ORDER — BUPIVACAINE HYDROCHLORIDE 2.5 MG/ML
10 INJECTION, SOLUTION INFILTRATION; PERINEURAL ONCE
Status: COMPLETED | OUTPATIENT
Start: 2024-03-26 | End: 2024-03-26

## 2024-03-26 RX ADMIN — BUPIVACAINE HYDROCHLORIDE 10 ML: 2.5 INJECTION, SOLUTION INFILTRATION; PERINEURAL at 16:24

## 2024-03-27 NOTE — PROGRESS NOTES
Procedure   Inject Trigger Points, > 3    Date/Time: 3/26/2024 4:20 PM    Performed by: Halie Rodríguez APRN  Authorized by: Halie Rodríguez APRN  Local anesthesia used: yes  Anesthesia: local infiltration    Anesthesia:  Local anesthesia used: yes  Local Anesthetic: bupivacaine 0.25% without epinephrine  Anesthetic total: 8 mL    Sedation:  Patient sedated: no    Patient tolerance: patient tolerated the procedure well with no immediate complications  Comments: Injected trigger points to bilateral cervical paraspinal and trapezius muscles       CRBTPI    Date/Time: 3/26/2024 4:20 PM    Performed by: Halie Rodríguez APRN  Authorized by: Halie Rodríguez APRN  Indications: pain relief  Body area: head (Greater and Lesser occipital nerve)  Nerve: greater occipital  Laterality: left    Sedation:  Patient sedated: no    Preparation: Aseptic Technique.  Patient position: sitting  Needle size: 27 G  Location technique: anatomical landmarks  Local Anesthetic: bupivacaine 0.25% without epinephrine  Anesthetic total: 2 (2mL per injection for total of 8mL) mL  Patient tolerance: Patient tolerated the procedure well with no immediate complications

## 2024-04-03 NOTE — PROGRESS NOTES
Timi Hernandez  1980  [phone]    04/09/2024    [facility] CARDIOLOGY     Referring Provider: No ref. 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 Grandfather with sudden cardiac death at age 36   Echo 3/29/2021 ejection fraction 55%, no significant valvular heart issues.  Stress Test 3/29/2021 good exercise tolerance, and exercise EKG negative for ischemia, atypical chest pain and abdominal pain with exercise, no arrhythmias seen.  Study is consistent with no reversible ischemia at rest or with exercise.  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 No VT. Less than 1% PACs, no PVCs   EP study 2/3/2022 but no inducible ventricular tachycardia and negative procainamide challenge test.  Normal QT interval in baseline state with appropriate shortening during atrial pacing and isoproterenol.  Normal HV interval   Neg TTT 4/19/2022  Loop recorder implant 7/29/2022   RBBB  HTN  HLD   SANCHEZ   GERD     Allergies  Allergies   Allergen Reactions    Penicillins Anaphylaxis    Sulfamethoxazole-Trimethoprim Anaphylaxis and Rash     Other reaction(s): sore throat and ulcers    Colchicine Rash     PT stated body was burning       Current Medications    Current  Outpatient Medications:     aspirin 81 MG EC tablet, Take 1 tablet by mouth Daily., Disp: , Rfl:     cetirizine (zyrTEC) 10 MG tablet, Zyrtec 10 mg oral tablet take 1 tablet (10 mg) by oral route once daily   Active, Disp: , Rfl:     clonazePAM (KlonoPIN) 1 MG tablet, Take 1 tablet by mouth As Needed., Disp: , Rfl:     cyclobenzaprine (FLEXERIL) 10 MG tablet, Take 1 tablet by mouth Every Night. (Patient taking differently: Take 1 tablet by mouth As Needed.), Disp: 30 tablet, Rfl: 3    hyoscyamine (LEVSIN) 0.125 MG SL tablet, PLACE 1 TABLET UNDER THE TONGUE EVERY 4-6 HOURS AS NEEDED FOR DIARRHEA/ABDOMINAL PAIN, Disp: 270 tablet, Rfl: 1    Ibuprofen 3 %, Gabapentin 10 %, Baclofen 2 %, lidocaine 4 %, Ketamine HCl 4 %, Apply 1-2 g topically to the appropriate area as directed 3 (Three) to 4 (Four) times daily., Disp: 90 g, Rfl: 0    Lovaza 1 g capsule, Take 2 capsules by mouth 2 (Two) Times a Day., Disp: 360 capsule, Rfl: 3    pantoprazole (PROTONIX) 40 MG EC tablet, , Disp: , Rfl:     tiZANidine (ZANAFLEX) 4 MG tablet, Take 1 tablet by mouth every night at bedtime., Disp: , Rfl:     Vyvanse 30 MG capsule, Take 1 capsule by mouth Daily, Disp: , Rfl:     Wegovy 2.4 MG/0.75ML solution auto-injector, , Disp: , Rfl:     Current Facility-Administered Medications:     bupivacaine (MARCAINE) 0.25 % injection 10 mL, 10 mL, Injection, Once, Halie Rodríguez APRN    History of Present Illness     Pt presents for follow up of tachypalpitations, SOB, CP, LH, and dizziness. Since we last saw the pt, he states that he has been experiencing tachycardia since August 2023. He describes it as his heart racing, associated with chest discomfort, and SOB. He is not sure if it has to do with anxiety, taking a Klonipin tends to help. He stopped his Vyvanse for a week without any relief in his symptoms. He is not on caffeine. It feels like it races everyday, but some days are worse than other days. His work environment is stressful, and he  "attributes some of his symptoms to this.  He has two episodes of AT on 3/31/2024 and on 4/7/2024, which were 4 and 5 hours in duration.  He cannot tell me if he felt that these days were worse than his other days of tachycardia.  Otherwise, he has just had ST according to his loop recorder. He denies any syncope, but does have dizziness if he stands up too quickly. He states his BPs are around 100-110 mmHg. He goes for walks 1-2 miles at a time, and feels   He has been on Wegovy and has lost 75 lbs over 11 months. He is going to start weaning off now. He has been exercising and also eating better.    ROS:  General:  +  fatigue, - weight gain or loss  Cardiovascular:  + CP, - PND, syncope, near syncope, edema + palpitations.  Pulmonary:  Denies XIE, cough, or wheezing      Vitals:    04/09/24 1024   BP: 138/88   BP Location: Right arm   Patient Position: Sitting   Pulse: 60   SpO2: 100%   Weight: 81.6 kg (180 lb)   Height: 182.9 cm (72\")     Body mass index is 24.41 kg/m².  PE:  General: NAD  Neck: no JVD, no carotid bruits, no TM  Heart RRR, NL S1, S2, S4 present, no rubs, murmurs  Lungs: CTA, no wheezes, rhonchi, or rales  Abd: soft, non-tender, NL BS  Ext: No musculoskeletal deformities, no edema, cyanosis, or clubbing  Psych: normal mood and affect    Diagnostic Data:    Loop Recorder Manual Interrogation: normal function. 5 hour episode of   bpm on 3/31/2024. 4 hour episode of ST on 4/7/2024 at 117 bpm.       ECG 12 Lead    Date/Time: 4/9/2024 11:01 AM  Performed by: Shauna Whitehead PA    Authorized by: Shauna Whitehead PA  Comparison: compared with previous ECG from 3/1/2023  Similar to previous ECG  Comparison to previous ECG: QTc 400 ms  Rhythm: sinus rhythm  BPM: 73  Conduction: right bundle branch block                 1. Syncope and collapse    2. Long Q-T syndrome    3. Prehypertension          Plan:  1.  Syncope:   -No recent syncopal episode  - Extensive monitoring during with his implant " recorder as well as extensive EP studies demonstrate no significant arrhythmias noted.  Loop recorder today demonstrates no significant bradycardia or AV block.  2 episodes of sinus tachycardia noted not associated with any syncope.  - discontinued Nadolol 11/2022 with resolution of his syncopal episodes.      2. ST:   -Interrogation of implantable loop recorder today shows two prolonged episodes of ST at 117-120 bpm. Has previously been on Nadolol but stopped due to hypotension and syncope. Will discuss with Dr. Sorensen on recommendations for treatement of his ST.      3.  Mixed Long QT 3 Syndrome/Brugada Syndrome: Extensive work-up performed.  QT interval stable overall.  See #1.     4.  Hypertension: Would not be too aggressive with blood pressure control as this may worsen his episodes of syncope.     F/up in 6 months    Electronically signed by MELODY New, 04/09/24, 10:55 AM EDT.

## 2024-04-09 ENCOUNTER — OFFICE VISIT (OUTPATIENT)
Dept: CARDIOLOGY | Facility: CLINIC | Age: 44
End: 2024-04-09
Payer: COMMERCIAL

## 2024-04-09 VITALS
WEIGHT: 180 LBS | SYSTOLIC BLOOD PRESSURE: 138 MMHG | OXYGEN SATURATION: 100 % | HEART RATE: 60 BPM | DIASTOLIC BLOOD PRESSURE: 88 MMHG | HEIGHT: 72 IN | BODY MASS INDEX: 24.38 KG/M2

## 2024-04-09 DIAGNOSIS — R03.0 PREHYPERTENSION: ICD-10-CM

## 2024-04-09 DIAGNOSIS — I45.81 LONG Q-T SYNDROME: ICD-10-CM

## 2024-04-09 DIAGNOSIS — R55 SYNCOPE AND COLLAPSE: Primary | ICD-10-CM

## 2024-04-10 ENCOUNTER — PATIENT MESSAGE (OUTPATIENT)
Dept: CARDIOLOGY | Facility: CLINIC | Age: 44
End: 2024-04-10
Payer: COMMERCIAL

## 2024-04-11 ENCOUNTER — OFFICE VISIT (OUTPATIENT)
Dept: FAMILY MEDICINE CLINIC | Facility: CLINIC | Age: 44
End: 2024-04-11
Payer: COMMERCIAL

## 2024-04-11 ENCOUNTER — HOSPITAL ENCOUNTER (OUTPATIENT)
Dept: GENERAL RADIOLOGY | Facility: HOSPITAL | Age: 44
Discharge: HOME | End: 2024-04-11
Payer: COMMERCIAL

## 2024-04-11 VITALS
OXYGEN SATURATION: 98 % | SYSTOLIC BLOOD PRESSURE: 102 MMHG | HEART RATE: 97 BPM | DIASTOLIC BLOOD PRESSURE: 60 MMHG | BODY MASS INDEX: 25.14 KG/M2 | HEIGHT: 72 IN | WEIGHT: 185.6 LBS | TEMPERATURE: 97.6 F

## 2024-04-11 DIAGNOSIS — R00.0 TACHYCARDIA: ICD-10-CM

## 2024-04-11 DIAGNOSIS — E78.2 MIXED HYPERLIPIDEMIA: ICD-10-CM

## 2024-04-11 DIAGNOSIS — M54.6 CHRONIC MIDLINE THORACIC BACK PAIN: Primary | ICD-10-CM

## 2024-04-11 DIAGNOSIS — R53.83 FATIGUE, UNSPECIFIED TYPE: ICD-10-CM

## 2024-04-11 DIAGNOSIS — G89.29 CHRONIC MIDLINE THORACIC BACK PAIN: ICD-10-CM

## 2024-04-11 DIAGNOSIS — G89.29 CHRONIC MIDLINE THORACIC BACK PAIN: Primary | ICD-10-CM

## 2024-04-11 DIAGNOSIS — M54.6 CHRONIC MIDLINE THORACIC BACK PAIN: ICD-10-CM

## 2024-04-11 DIAGNOSIS — E55.9 VITAMIN D DEFICIENCY: ICD-10-CM

## 2024-04-11 DIAGNOSIS — Z01.89 ROUTINE LAB DRAW: ICD-10-CM

## 2024-04-11 PROCEDURE — 72072 X-RAY EXAM THORAC SPINE 3VWS: CPT

## 2024-04-11 NOTE — PROGRESS NOTES
Timi Hernandez presents to [facility] who presents to clinic for 6-month follow-up.      History of Present Illness  This is a 43-year-old male who presents to the clinic for 6-month follow-up.    Cardiac: Patient states that he is recently followed up with his electrophysiologist in regards to his cardiac issues.  States that he has had a loop recorder in for quite some time, and they are still trying to figure out why he is having these intermittent raises in his heart rate.  States that he will have sinus tachycardia that would last for several hours at a time.  Some of it could be related to stress and anxiety, but patient would like to rule out other things as well.  He is recently stopped taking his fenofibrate and vitamin B12, wants to make sure that this has not thrown off his blood work and would like a full panel ordered to evaluate.  Denies chest pain or shortness of breath.  No other issues.    Obesity: Patient is now been on Wegovy for the past several months and has done great.  Patient states that they are getting ready to start the process of weaning him off of Wegovy, as he is at a good target weight, and they want him to maintain.  No adverse effects to medication.    Patient also states that he has been starting to have this pain for the past few months in his middle part of his back.  Has tried to address this with pain management and his neuro surgeon specialist, but states that he is not really getting anywhere.  Has not had any imaging of the middle part of his back, and states that the pain since right in the middle part and is like a sharp stabbing pain whenever he stands for too long, walks for too long, or does anything extensive.  States he does not feel it whenever he is resting, mainly when he is doing any extensive physical activity.  Would like to have imaging to look up further if able.    Mental health: Patient continues to follow-up with  "psychiatry in regards to this, overall feels like he is stable and doing pretty well, has had suicidal thoughts in the past but knows when to seek emergency evaluation for this.  Remains on clonazepam as instructed via psychiatry, was on Vyvanse but is having difficulty and tying in obtaining due to stock issues.    Up-to-date on preventative screenings.  Will do annual physical at next visit.    The following portions of the patient's history were personally reviewed and updated as appropriate: allergies, current medications, past medical history, past surgical history, past family history, and past social history.       Objective   Vital Signs:   /60 (BP Location: Left arm, Patient Position: Sitting, Cuff Size: Adult)   Pulse 97   Temp 97.6 °F (36.4 °C)   Ht 182.9 cm (72\")   Wt 84.2 kg (185 lb 9.6 oz)   SpO2 98%   BMI 25.17 kg/m²     Body mass index is 25.17 kg/m².    All labs, imaging, test results, and specialty provider notes reviewed with patient.     Physical Exam  Vitals reviewed.   Constitutional:       Appearance: Normal appearance.   Cardiovascular:      Rate and Rhythm: Normal rate and regular rhythm.      Pulses: Normal pulses.      Heart sounds: Normal heart sounds.   Pulmonary:      Effort: Pulmonary effort is normal.      Breath sounds: Normal breath sounds.   Neurological:      General: No focal deficit present.      Mental Status: He is alert and oriented to person, place, and time.                               Assessment and Plan:  Diagnoses and all orders for this visit:    1. Chronic midline thoracic back pain (Primary)  Comments:  Obtain x-ray, further treatment based off results.  Likely adding to pain management/neurosurgery treatment.  Consider MRI.  Orders:  -     XR Spine Thoracic 3 View; Future    2. Routine lab draw  -     CBC Auto Differential; Future  -     Comprehensive Metabolic Panel; Future  -     TSH Rfx On Abnormal To Free T4; Future  -     Urinalysis With Culture If " Indicated -; Future    3. Mixed hyperlipidemia  -     Lipid Panel; Future    4. Vitamin D deficiency  -     Vitamin D,25-Hydroxy; Future    5. Fatigue, unspecified type  -     Vitamin B12 & Folate; Future    6. Tachycardia  Comments:  Evaluate full panel blood work, add magnesium level as well.  Orders:  -     Magnesium; Future          Follow Up:  Return in about 6 months (around 10/11/2024).    Patient was given instructions and counseling regarding his condition or for health maintenance advice. Please see specific information pulled into the AVS if appropriate.

## 2024-04-12 ENCOUNTER — LAB (OUTPATIENT)
Dept: LAB | Facility: HOSPITAL | Age: 44
End: 2024-04-12
Payer: COMMERCIAL

## 2024-04-12 DIAGNOSIS — E55.9 VITAMIN D DEFICIENCY: ICD-10-CM

## 2024-04-12 DIAGNOSIS — R00.0 TACHYCARDIA: ICD-10-CM

## 2024-04-12 DIAGNOSIS — Z01.89 ROUTINE LAB DRAW: ICD-10-CM

## 2024-04-12 DIAGNOSIS — R53.83 FATIGUE, UNSPECIFIED TYPE: ICD-10-CM

## 2024-04-12 DIAGNOSIS — E78.2 MIXED HYPERLIPIDEMIA: ICD-10-CM

## 2024-04-12 LAB
BASOPHILS # BLD AUTO: 0.02 10*3/MM3 (ref 0–0.2)
BASOPHILS NFR BLD AUTO: 0.3 % (ref 0–1.5)
BILIRUB UR QL STRIP: NEGATIVE
CLARITY UR: CLEAR
COLOR UR: YELLOW
DEPRECATED RDW RBC AUTO: 42.1 FL (ref 37–54)
EOSINOPHIL # BLD AUTO: 0.08 10*3/MM3 (ref 0–0.4)
EOSINOPHIL NFR BLD AUTO: 1.3 % (ref 0.3–6.2)
ERYTHROCYTE [DISTWIDTH] IN BLOOD BY AUTOMATED COUNT: 13.2 % (ref 12.3–15.4)
GLUCOSE UR STRIP-MCNC: NEGATIVE MG/DL
HCT VFR BLD AUTO: 46.9 % (ref 37.5–51)
HGB BLD-MCNC: 15.3 G/DL (ref 13–17.7)
HGB UR QL STRIP.AUTO: NEGATIVE
IMM GRANULOCYTES # BLD AUTO: 0 10*3/MM3 (ref 0–0.05)
IMM GRANULOCYTES NFR BLD AUTO: 0 % (ref 0–0.5)
KETONES UR QL STRIP: NEGATIVE
LEUKOCYTE ESTERASE UR QL STRIP.AUTO: NEGATIVE
LYMPHOCYTES # BLD AUTO: 2.08 10*3/MM3 (ref 0.7–3.1)
LYMPHOCYTES NFR BLD AUTO: 33.2 % (ref 19.6–45.3)
MCH RBC QN AUTO: 28.4 PG (ref 26.6–33)
MCHC RBC AUTO-ENTMCNC: 32.6 G/DL (ref 31.5–35.7)
MCV RBC AUTO: 87 FL (ref 79–97)
MONOCYTES # BLD AUTO: 0.39 10*3/MM3 (ref 0.1–0.9)
MONOCYTES NFR BLD AUTO: 6.2 % (ref 5–12)
NEUTROPHILS NFR BLD AUTO: 3.7 10*3/MM3 (ref 1.7–7)
NEUTROPHILS NFR BLD AUTO: 59 % (ref 42.7–76)
NITRITE UR QL STRIP: NEGATIVE
NRBC BLD AUTO-RTO: 0 /100 WBC (ref 0–0.2)
PH UR STRIP.AUTO: 5.5 [PH] (ref 5–8)
PLATELET # BLD AUTO: 211 10*3/MM3 (ref 140–450)
PMV BLD AUTO: 10.7 FL (ref 6–12)
PROT UR QL STRIP: NEGATIVE
RBC # BLD AUTO: 5.39 10*6/MM3 (ref 4.14–5.8)
SP GR UR STRIP: 1.02 (ref 1–1.03)
UROBILINOGEN UR QL STRIP: NORMAL
WBC NRBC COR # BLD AUTO: 6.27 10*3/MM3 (ref 3.4–10.8)

## 2024-04-12 PROCEDURE — 81003 URINALYSIS AUTO W/O SCOPE: CPT

## 2024-04-12 PROCEDURE — 80061 LIPID PANEL: CPT

## 2024-04-12 PROCEDURE — 83735 ASSAY OF MAGNESIUM: CPT

## 2024-04-12 PROCEDURE — 82746 ASSAY OF FOLIC ACID SERUM: CPT

## 2024-04-12 PROCEDURE — 82607 VITAMIN B-12: CPT

## 2024-04-12 PROCEDURE — 36415 COLL VENOUS BLD VENIPUNCTURE: CPT

## 2024-04-12 PROCEDURE — 80050 GENERAL HEALTH PANEL: CPT

## 2024-04-12 PROCEDURE — 82306 VITAMIN D 25 HYDROXY: CPT

## 2024-04-12 RX ORDER — NADOLOL 20 MG/1
10 TABLET ORAL DAILY
Qty: 30 TABLET | Refills: 6 | Status: SHIPPED | OUTPATIENT
Start: 2024-04-12

## 2024-04-13 LAB
25(OH)D3 SERPL-MCNC: 37 NG/ML (ref 30–100)
ALBUMIN SERPL-MCNC: 4.7 G/DL (ref 3.5–5.2)
ALBUMIN/GLOB SERPL: 1.7 G/DL
ALP SERPL-CCNC: 49 U/L (ref 39–117)
ALT SERPL W P-5'-P-CCNC: 17 U/L (ref 1–41)
ANION GAP SERPL CALCULATED.3IONS-SCNC: 12.7 MMOL/L (ref 5–15)
AST SERPL-CCNC: 23 U/L (ref 1–40)
BILIRUB SERPL-MCNC: 0.6 MG/DL (ref 0–1.2)
BUN SERPL-MCNC: 18 MG/DL (ref 6–20)
BUN/CREAT SERPL: 20.5 (ref 7–25)
CALCIUM SPEC-SCNC: 9.8 MG/DL (ref 8.6–10.5)
CHLORIDE SERPL-SCNC: 100 MMOL/L (ref 98–107)
CHOLEST SERPL-MCNC: 200 MG/DL (ref 0–200)
CO2 SERPL-SCNC: 27.3 MMOL/L (ref 22–29)
CREAT SERPL-MCNC: 0.88 MG/DL (ref 0.76–1.27)
EGFRCR SERPLBLD CKD-EPI 2021: 109.4 ML/MIN/1.73
FOLATE SERPL-MCNC: 6.13 NG/ML (ref 4.78–24.2)
GLOBULIN UR ELPH-MCNC: 2.7 GM/DL
GLUCOSE SERPL-MCNC: 88 MG/DL (ref 65–99)
HDLC SERPL-MCNC: 34 MG/DL (ref 40–60)
HOLD SPECIMEN: NORMAL
LDLC SERPL CALC-MCNC: 138 MG/DL (ref 0–100)
LDLC/HDLC SERPL: 3.97 {RATIO}
MAGNESIUM SERPL-MCNC: 2.3 MG/DL (ref 1.6–2.6)
POTASSIUM SERPL-SCNC: 4.6 MMOL/L (ref 3.5–5.2)
PROT SERPL-MCNC: 7.4 G/DL (ref 6–8.5)
SODIUM SERPL-SCNC: 140 MMOL/L (ref 136–145)
TRIGL SERPL-MCNC: 155 MG/DL (ref 0–150)
TSH SERPL DL<=0.05 MIU/L-ACNC: 0.96 UIU/ML (ref 0.27–4.2)
VIT B12 BLD-MCNC: 568 PG/ML (ref 211–946)
VLDLC SERPL-MCNC: 28 MG/DL (ref 5–40)

## 2024-04-17 DIAGNOSIS — Z20.2 POSSIBLE EXPOSURE TO STD: Primary | ICD-10-CM

## 2024-04-17 NOTE — TELEPHONE ENCOUNTER
From: Shauna Whitehead  To: Timi Hernandez  Sent: 4/10/2024 12:19 PM EDT  Subject: Medication    Hi Dontrell,   I talked to Dr. Sorensen, and he wants to start you back on Nadolol. A very small dose. We will do 10 mg, which will be taking 1/2 tablet of a 20 mg tablet. I will send this into your pharmacy. He reviewed your loop recorder readings, and he says that all your rhythms are sinus tachycardia and not an arrhythmia. Let us know if this helps you. You should check your BPs while on it to make sure they are not getting too low. Also, he agreed that you should wean off of Wegovy. Thank you!     -Shauna Whitehead PA-C

## 2024-04-18 ENCOUNTER — LAB (OUTPATIENT)
Dept: LAB | Facility: HOSPITAL | Age: 44
End: 2024-04-18
Payer: COMMERCIAL

## 2024-04-18 ENCOUNTER — PROCEDURE VISIT (OUTPATIENT)
Dept: NEUROLOGY | Facility: CLINIC | Age: 44
End: 2024-04-18
Payer: COMMERCIAL

## 2024-04-18 DIAGNOSIS — M54.81 OCCIPITAL NEURALGIA OF LEFT SIDE: ICD-10-CM

## 2024-04-18 DIAGNOSIS — M54.2 CERVICALGIA: Primary | ICD-10-CM

## 2024-04-18 DIAGNOSIS — Z20.2 POSSIBLE EXPOSURE TO STD: ICD-10-CM

## 2024-04-18 LAB
C TRACH RRNA CVX QL NAA+PROBE: NOT DETECTED
HIV 1+2 AB+HIV1 P24 AG SERPL QL IA: NORMAL
N GONORRHOEA RRNA SPEC QL NAA+PROBE: NOT DETECTED
RPR SER QL: NORMAL

## 2024-04-18 PROCEDURE — 87591 N.GONORRHOEAE DNA AMP PROB: CPT

## 2024-04-18 PROCEDURE — 87661 TRICHOMONAS VAGINALIS AMPLIF: CPT

## 2024-04-18 PROCEDURE — G0432 EIA HIV-1/HIV-2 SCREEN: HCPCS

## 2024-04-18 PROCEDURE — 36415 COLL VENOUS BLD VENIPUNCTURE: CPT

## 2024-04-18 PROCEDURE — 87491 CHLMYD TRACH DNA AMP PROBE: CPT

## 2024-04-18 PROCEDURE — 86696 HERPES SIMPLEX TYPE 2 TEST: CPT

## 2024-04-18 PROCEDURE — 86695 HERPES SIMPLEX TYPE 1 TEST: CPT

## 2024-04-18 PROCEDURE — 86592 SYPHILIS TEST NON-TREP QUAL: CPT

## 2024-04-18 RX ORDER — BUPIVACAINE HYDROCHLORIDE 2.5 MG/ML
10 INJECTION, SOLUTION INFILTRATION; PERINEURAL ONCE
Status: COMPLETED | OUTPATIENT
Start: 2024-04-18 | End: 2024-04-18

## 2024-04-18 RX ADMIN — BUPIVACAINE HYDROCHLORIDE 10 ML: 2.5 INJECTION, SOLUTION INFILTRATION; PERINEURAL at 16:34

## 2024-04-19 LAB
HSV1 IGG SER IA-ACNC: 59.9 INDEX (ref 0–0.9)
HSV2 IGG SER IA-ACNC: <0.91 INDEX (ref 0–0.9)

## 2024-04-20 LAB — T VAGINALIS RRNA SPEC QL NAA+PROBE: NEGATIVE

## 2024-04-22 DIAGNOSIS — M54.6 CHRONIC MIDLINE THORACIC BACK PAIN: Primary | ICD-10-CM

## 2024-04-22 DIAGNOSIS — G89.29 CHRONIC MIDLINE THORACIC BACK PAIN: Primary | ICD-10-CM

## 2024-04-25 NOTE — PROGRESS NOTES
Procedure   Inject Trigger Points, > 3    Date/Time: 4/18/2024 4:20 PM    Performed by: Halie Rodríguez APRN  Authorized by: Halie Rodríguez APRN  Local anesthesia used: yes  Anesthesia: local infiltration    Anesthesia:  Local anesthesia used: yes  Local Anesthetic: bupivacaine 0.25% without epinephrine  Anesthetic total: 8 mL    Sedation:  Patient sedated: no    Patient tolerance: patient tolerated the procedure well with no immediate complications  Comments: Injected trigger points to bilateral cervical paraspinal and trapezius muscles       CRBTPI    Date/Time: 4/18/2024 4:20 PM    Performed by: Halie Rodríguez APRN  Authorized by: Halie Rodríguez APRN  Indications: pain relief  Body area: head (Greater and Lesser occipital nerve)  Nerve: greater occipital  Laterality: left    Sedation:  Patient sedated: no    Preparation: Aseptic Technique.  Patient position: sitting  Needle size: 27 G  Location technique: anatomical landmarks  Local Anesthetic: bupivacaine 0.25% without epinephrine  Anesthetic total: 2 (2mL per injection for total of 8mL) mL  Patient tolerance: Patient tolerated the procedure well with no immediate complications

## 2024-04-29 ENCOUNTER — OFFICE VISIT (OUTPATIENT)
Dept: CARDIOLOGY | Facility: CLINIC | Age: 44
End: 2024-04-29
Payer: COMMERCIAL

## 2024-04-29 VITALS
BODY MASS INDEX: 25.19 KG/M2 | HEART RATE: 75 BPM | DIASTOLIC BLOOD PRESSURE: 67 MMHG | SYSTOLIC BLOOD PRESSURE: 128 MMHG | WEIGHT: 186 LBS | HEIGHT: 72 IN

## 2024-04-29 DIAGNOSIS — E78.5 DYSLIPIDEMIA: ICD-10-CM

## 2024-04-29 DIAGNOSIS — I49.8 BRUGADA SYNDROME: Primary | ICD-10-CM

## 2024-04-29 DIAGNOSIS — R03.0 PREHYPERTENSION: ICD-10-CM

## 2024-04-29 DIAGNOSIS — I45.81 LONG Q-T SYNDROME: ICD-10-CM

## 2024-04-29 PROBLEM — R12 HEARTBURN: Status: RESOLVED | Noted: 2023-05-03 | Resolved: 2024-04-29

## 2024-04-29 PROBLEM — E78.2 MIXED HYPERLIPIDEMIA: Status: RESOLVED | Noted: 2022-09-13 | Resolved: 2024-04-29

## 2024-04-29 PROBLEM — R10.12 LEFT UPPER QUADRANT ABDOMINAL PAIN: Status: RESOLVED | Noted: 2023-05-03 | Resolved: 2024-04-29

## 2024-04-29 PROBLEM — R74.8 ELEVATED CK: Status: RESOLVED | Noted: 2024-02-01 | Resolved: 2024-04-29

## 2024-04-29 PROBLEM — R55 SYNCOPE AND COLLAPSE: Status: RESOLVED | Noted: 2022-07-07 | Resolved: 2024-04-29

## 2024-04-29 PROCEDURE — 99214 OFFICE O/P EST MOD 30 MIN: CPT | Performed by: NURSE PRACTITIONER

## 2024-04-29 NOTE — PROGRESS NOTES
Chief Complaint  Follow-up    Subjective            History of Present Illness  Timi Hernandez is a 43-year-old male patient who presents to the office today for follow-up.  He has Brugada syndrome, long QT syndrome, hyperlipidemia, and is considered prehypertensive.  He has occasional episodes of tachycardia and is currently being prescribed nadolol 10 mg daily.  He admits that he does not take it on a daily basis because he has had issues with hypotension in the past.  Today he only experiences dizziness with position change from crouching down and standing back up too quickly.  He denies any chest pain, shortness of breath, or edema.    PMH  Past Medical History:   Diagnosis Date    ADHD (attention deficit hyperactivity disorder) 1992    Anxiety     Arthritis     Brain concussion     Brugada syndrome 11/16/2021    Chronic allergic rhinitis     CTS (carpal tunnel syndrome)     Depression 2004    Diverticulosis 2019    Essential hypertension     Family history of colon cancer in mother     Fatty liver     GERD (gastroesophageal reflux disease)     Gout     Headache     Hepatomegaly     HL (hearing loss) 2004    Hyperlipidemia     IBS (irritable bowel syndrome)     Ingrown toenail     Kidney stones     Low back pain 2022    Lumbosacral disc disease     Lyme disease 2021    Plantar fasciitis     Right bundle branch block (RBBB), 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)     Syncope          ALLERGY  Allergies   Allergen Reactions    Penicillins Anaphylaxis    Sulfamethoxazole-Trimethoprim Anaphylaxis and Rash     Other reaction(s): sore throat and ulcers    Colchicine Rash     PT stated body was burning          SURGICALHX  Past Surgical History:   Procedure Laterality Date    ADENOIDECTOMY  1993    CARDIAC CATHETERIZATION  2022    CARDIAC ELECTROPHYSIOLOGY PROCEDURE N/A 02/03/2022    Procedure: EPS + Procainimide Challenge +/- SICD (BSC), no meds to hold;  Surgeon: Lang Sorensen  MD ROD;  Location: [facility] INVASIVE LOCATION;  Service: Cardiology;  Laterality: N/A;    CHOLECYSTECTOMY  2017    Dr. Ray    COLONOSCOPY  2019    EPIDURAL BLOCK  May 2023    EYE SURGERY  2008    GALLBLADDER SURGERY      HERNIA REPAIR      INGUINAL HERNIA REPAIR  1984    LASIK      TOE SURGERY      TONSILLECTOMY      TRIGGER POINT INJECTION  June 2023    VASCULAR SURGERY  Feb 2022    VASECTOMY            SOC  Social History     Socioeconomic History    Marital status:    Tobacco Use    Smoking status: Never    Smokeless tobacco: Never    Tobacco comments:     second hand smoke exposure-never   Vaping Use    Vaping status: Never Used   Substance and Sexual Activity    Alcohol use: Not Currently    Drug use: Never    Sexual activity: Yes     Partners: Female     Birth control/protection: Vasectomy, Surgical     Comment: Vasectomy         FAMHX  Family History   Problem Relation Age of Onset    Heart failure Mother         Heart attack in her early 50s    Hyperlipidemia Mother     Heart attack Mother     Hypertension Mother     Colon cancer Mother 53    Stroke Mother     Alcohol abuse Mother     Anxiety disorder Mother     Cancer Mother         colon    Developmental Disability Mother     Arrhythmia Mother     Heart disease Father     Alcohol abuse Father     Heart disease Sister     Lung cancer Maternal Uncle         50s    Heart disease Maternal Uncle     Alcohol abuse Maternal Uncle     Stroke Maternal Grandfather     Heart disease Maternal Grandfather     Heart attack Other     Heart disease Other     Heart failure Other     Hypertension Other     Hyperlipidemia Other           MEDSIGONLY  Current Outpatient Medications on File Prior to Visit   Medication Sig    aspirin 81 MG EC tablet Take 1 tablet by mouth Daily.    cetirizine (zyrTEC) 10 MG tablet Zyrtec 10 mg oral tablet take 1 tablet (10 mg) by oral route once daily   Active    clonazePAM (KlonoPIN) 1 MG tablet Take 1 tablet by mouth As Needed.     "cyclobenzaprine (FLEXERIL) 10 MG tablet Take 1 tablet by mouth Every Night. (Patient taking differently: Take 1 tablet by mouth As Needed.)    Ibuprofen 3 %, Gabapentin 10 %, Baclofen 2 %, lidocaine 4 %, Ketamine HCl 4 % Apply 1-2 g topically to the appropriate area as directed 3 (Three) to 4 (Four) times daily.    Lovaza 1 g capsule Take 2 capsules by mouth 2 (Two) Times a Day.    nadolol (Corgard) 20 MG tablet Take 0.5 tablets by mouth Daily.    pantoprazole (PROTONIX) 40 MG EC tablet     Vyvanse 30 MG capsule Take 1 capsule by mouth Daily    Wegovy 2.4 MG/0.75ML solution auto-injector     [DISCONTINUED] tiZANidine (ZANAFLEX) 4 MG tablet Take 1 tablet by mouth every night at bedtime.     Current Facility-Administered Medications on File Prior to Visit   Medication    bupivacaine (MARCAINE) 0.25 % injection 10 mL         Objective   /67   Pulse 75   Ht 182.9 cm (72\")   Wt 84.4 kg (186 lb)   BMI 25.23 kg/m²       Physical Exam  HENT:      Head: Normocephalic.   Neck:      Vascular: No carotid bruit.   Cardiovascular:      Rate and Rhythm: Normal rate and regular rhythm.      Pulses: Normal pulses.      Heart sounds: Normal heart sounds. No murmur heard.  Pulmonary:      Effort: Pulmonary effort is normal.      Breath sounds: Normal breath sounds.   Musculoskeletal:      Cervical back: Neck supple.      Right lower leg: No edema.      Left lower leg: No edema.   Skin:     General: Skin is dry.   Neurological:      Mental Status: He is alert and oriented to person, place, and time.   Psychiatric:         Behavior: Behavior normal.       Result Review :   The following data was reviewed by: NICOLE Martínez on 04/29/2024:  No results found for: \"PROBNP\"  CMP          4/12/2024    12:05   CMP   Glucose 88    BUN 18    Creatinine 0.88    EGFR 109.4    Sodium 140    Potassium 4.6    Chloride 100    Calcium 9.8    Total Protein 7.4    Albumin 4.7    Globulin 2.7    Total Bilirubin 0.6    Alkaline Phosphatase " "49    AST (SGOT) 23    ALT (SGPT) 17    Albumin/Globulin Ratio 1.7    BUN/Creatinine Ratio 20.5    Anion Gap 12.7      CBC w/diff          4/12/2024    12:05   CBC w/Diff   WBC 6.27    RBC 5.39    Hemoglobin 15.3    Hematocrit 46.9    MCV 87.0    MCH 28.4    MCHC 32.6    RDW 13.2    Platelets 211    Neutrophil Rel % 59.0    Immature Granulocyte Rel % 0.0    Lymphocyte Rel % 33.2    Monocyte Rel % 6.2    Eosinophil Rel % 1.3    Basophil Rel % 0.3       Lab Results   Component Value Date    TSH 0.956 04/12/2024      Lab Results   Component Value Date    FREET4 1.20 03/01/2022      No results found for: \"DDIMERQUANT\"  Magnesium   Date Value Ref Range Status   04/12/2024 2.3 1.6 - 2.6 mg/dL Final      No results found for: \"DIGOXIN\"   No results found for: \"TROPONINT\"        Lipid Panel          4/12/2024    12:05   Lipid Panel   Total Cholesterol 200    Triglycerides 155    HDL Cholesterol 34    VLDL Cholesterol 28    LDL Cholesterol  138    LDL/HDL Ratio 3.97             Assessment and Plan    Diagnoses and all orders for this visit:    1. Brugada syndrome & 2. Long Q-T syndrome  Symptomatically stable at this time.  He saw EP on 4/9/2024 and had an EKG which was stable.  He denies any new or worsening symptoms at this time.  Continue nadolol as needed for tachycardic episodes.    3. Dyslipidemia  Last lipid panel was 4/12/2024 with  which is mildly above goal range.  He is working to modify diet to lower lipid levels.  Continue Lovaza 2 g twice daily.    4. Prehypertension  Blood pressure is in normal range today, continue to monitor.      Follow Up   Return in about 6 months (around 10/29/2024) for Follow up with Dr Sawant.    Patient was given instructions and counseling regarding his condition or for health maintenance advice. Please see specific information pulled into the AVS if appropriate.     Timi RADHA Hernandez  reports that he has never smoked. He has never used smokeless tobacco.            Kelsey BARRIOS" NICOLE Lauren  04/29/24  08:42 EDT    Dictated Utilizing Dragon Dictation

## 2024-07-08 ENCOUNTER — HOSPITAL ENCOUNTER (OUTPATIENT)
Dept: GENERAL RADIOLOGY | Facility: HOSPITAL | Age: 44
Discharge: HOME | End: 2024-07-08
Admitting: NURSE PRACTITIONER
Payer: COMMERCIAL

## 2024-07-08 ENCOUNTER — OFFICE VISIT (OUTPATIENT)
Dept: FAMILY MEDICINE CLINIC | Facility: CLINIC | Age: 44
End: 2024-07-08
Payer: COMMERCIAL

## 2024-07-08 VITALS
DIASTOLIC BLOOD PRESSURE: 70 MMHG | HEIGHT: 72 IN | WEIGHT: 185 LBS | BODY MASS INDEX: 25.06 KG/M2 | SYSTOLIC BLOOD PRESSURE: 116 MMHG | HEART RATE: 95 BPM | TEMPERATURE: 97.4 F | OXYGEN SATURATION: 99 %

## 2024-07-08 DIAGNOSIS — R31.9 HEMATURIA, UNSPECIFIED TYPE: ICD-10-CM

## 2024-07-08 DIAGNOSIS — Z11.1 ENCOUNTER FOR PPD TEST: ICD-10-CM

## 2024-07-08 DIAGNOSIS — M54.50 CHRONIC BILATERAL LOW BACK PAIN WITHOUT SCIATICA: ICD-10-CM

## 2024-07-08 DIAGNOSIS — R10.9 FLANK PAIN: ICD-10-CM

## 2024-07-08 DIAGNOSIS — Z20.828 EXPOSURE TO SARS-ASSOCIATED CORONAVIRUS: ICD-10-CM

## 2024-07-08 DIAGNOSIS — R09.81 NASAL CONGESTION: Primary | ICD-10-CM

## 2024-07-08 DIAGNOSIS — R51.9 NONINTRACTABLE HEADACHE, UNSPECIFIED CHRONICITY PATTERN, UNSPECIFIED HEADACHE TYPE: ICD-10-CM

## 2024-07-08 DIAGNOSIS — G89.29 CHRONIC BILATERAL LOW BACK PAIN WITHOUT SCIATICA: ICD-10-CM

## 2024-07-08 LAB
BILIRUB BLD-MCNC: NEGATIVE MG/DL
CLARITY, POC: CLEAR
COLOR UR: YELLOW
EXPIRATION DATE: ABNORMAL
EXPIRATION DATE: NORMAL
GLUCOSE UR STRIP-MCNC: NEGATIVE MG/DL
INTERNAL CONTROL: NORMAL
KETONES UR QL: NEGATIVE
LEUKOCYTE EST, POC: NEGATIVE
Lab: ABNORMAL
Lab: NORMAL
NITRITE UR-MCNC: NEGATIVE MG/ML
PH UR: 6 [PH] (ref 5–8)
PROT UR STRIP-MCNC: NEGATIVE MG/DL
RBC # UR STRIP: ABNORMAL /UL
SARS-COV-2 AG UPPER RESP QL IA.RAPID: NOT DETECTED
SP GR UR: 1.02 (ref 1–1.03)
UROBILINOGEN UR QL: NORMAL

## 2024-07-08 PROCEDURE — 87426 SARSCOV CORONAVIRUS AG IA: CPT | Performed by: NURSE PRACTITIONER

## 2024-07-08 PROCEDURE — 81003 URINALYSIS AUTO W/O SCOPE: CPT | Performed by: NURSE PRACTITIONER

## 2024-07-08 PROCEDURE — 86580 TB INTRADERMAL TEST: CPT | Performed by: NURSE PRACTITIONER

## 2024-07-08 PROCEDURE — 74018 RADEX ABDOMEN 1 VIEW: CPT

## 2024-07-08 PROCEDURE — 99213 OFFICE O/P EST LOW 20 MIN: CPT | Performed by: NURSE PRACTITIONER

## 2024-07-08 RX ORDER — PREDNISONE 20 MG/1
40 TABLET ORAL DAILY
Qty: 10 TABLET | Refills: 0 | Status: SHIPPED | OUTPATIENT
Start: 2024-07-08 | End: 2024-07-13

## 2024-07-08 NOTE — PROGRESS NOTES
"Chief Complaint  Back Pain    Subjective         Timi Hernandez presents to [facility]  Patient presents to the office with complaints of low back pain.  He states that he has a history of kidney stones and was concerned that he had developed another 1.  He states that the pain is approximately a 5 out of 10 at this time.  He states that it started over the weekend and has progressively got worse.  He denies any dysuria.  He does state that he is had increased frequency.  He denies any fevers at this time.  Patient also complains of headaches as well as sinus congestion and postnasal drip.  He states that he is doing allergy shots but states that the symptoms started approximately a week ago.  He denies any nausea vomiting.  He does state that he has had a mild sore throat as well.  Patient also request a TB skin test that he needs for employment.    Back Pain         Objective     Vitals:    07/08/24 0905   BP: 116/70   BP Location: Right arm   Patient Position: Sitting   Cuff Size: Adult   Pulse: 95   Temp: 97.4 °F (36.3 °C)   TempSrc: Temporal   SpO2: 99%   Weight: 83.9 kg (185 lb)   Height: 182.9 cm (72\")      Body mass index is 25.09 kg/m².             Physical Exam  Vitals reviewed.   Constitutional:       Appearance: Normal appearance.   HENT:      Nose: Congestion and rhinorrhea present.   Cardiovascular:      Rate and Rhythm: Normal rate and regular rhythm.      Pulses: Normal pulses.      Heart sounds: Normal heart sounds, S1 normal and S2 normal. No murmur heard.  Pulmonary:      Effort: Pulmonary effort is normal. No respiratory distress.      Breath sounds: Normal breath sounds.   Abdominal:      Tenderness: There is left CVA tenderness.   Skin:     General: Skin is warm and dry.   Neurological:      Mental Status: He is alert and oriented to person, place, and time.   Psychiatric:         Attention and Perception: Attention normal.         Mood and Affect: Mood " normal.         Behavior: Behavior normal.          Result Review :   The following data was reviewed by: NICOLE Crespo on 07/08/2024:      Procedures    Assessment and Plan   Diagnoses and all orders for this visit:    1. Nasal congestion (Primary)  -     POCT SARS-CoV-2 Antigen YANCI  -     predniSONE (DELTASONE) 20 MG tablet; Take 2 tablets by mouth Daily for 5 days.  Dispense: 10 tablet; Refill: 0    2. Nonintractable headache, unspecified chronicity pattern, unspecified headache type  -     POCT SARS-CoV-2 Antigen YANCI    3. Exposure to SARS-associated coronavirus  -     POCT SARS-CoV-2 Antigen YANCI    4. Flank pain  -     POCT urinalysis dipstick, automated  -     XR Abdomen KUB; Future    5. Hematuria, unspecified type  -     XR Abdomen KUB; Future    6. Chronic bilateral low back pain without sciatica  -     predniSONE (DELTASONE) 20 MG tablet; Take 2 tablets by mouth Daily for 5 days.  Dispense: 10 tablet; Refill: 0    7. Encounter for PPD test  -     TB Skin Test    Based on the patient's history and the hematuria I did explain that we obtain a KUB.  I explained that if this came back positive that we would obtain a CT to further evaluate.  I did explain that the pain may be coming from his chronic back pain as well.  I did discuss starting him on a steroid for the postnasal drip and sinus congestion.  I did explain that this would also help if the pain was coming from his chronic degenerative disc disease.      Follow Up   Return if symptoms worsen or fail to improve.  Patient was given instructions and counseling regarding his condition or for health maintenance advice. Please see specific information pulled into the AVS if appropriate.

## 2024-07-09 DIAGNOSIS — R10.9 FLANK PAIN: Primary | ICD-10-CM

## 2024-07-10 ENCOUNTER — CLINICAL SUPPORT (OUTPATIENT)
Dept: FAMILY MEDICINE CLINIC | Facility: CLINIC | Age: 44
End: 2024-07-10
Payer: COMMERCIAL

## 2024-07-10 DIAGNOSIS — Z11.1 VISIT FOR TB SKIN TEST: Primary | ICD-10-CM

## 2024-07-10 LAB
INDURATION: 0 MM (ref 0–10)
Lab: NORMAL
Lab: NORMAL
TB SKIN TEST: NEGATIVE

## 2024-07-19 ENCOUNTER — HOSPITAL ENCOUNTER (OUTPATIENT)
Dept: CT IMAGING | Facility: HOSPITAL | Age: 44
Discharge: HOME | End: 2024-07-19
Admitting: NURSE PRACTITIONER
Payer: COMMERCIAL

## 2024-07-19 DIAGNOSIS — R10.9 FLANK PAIN: ICD-10-CM

## 2024-07-19 PROCEDURE — 74176 CT ABD & PELVIS W/O CONTRAST: CPT

## 2024-08-14 NOTE — PROGRESS NOTES
Patient Name: Timi Hernandez   Visit Date: 08/15/2024   Patient ID: [id number]  Provider: NICOLE Estrada    Sex: male  Location:  Location Address:  Location Phone: [address]  [place] [address] [phone]    YOB: 1980  Age: 44 y.o.   Primary Care Provider Clemencia Salas APRN      Referring Provider: No ref. provider found        Chief Complaint  Fatty liver (Follow up ) and Nausea    History of Present Illness  Patient initially presented January 2019 with elevated LFTs for 1 year and complained of loose stools.  Ultrasound from November showed hepatomegaly with diffuse fatty infiltration.  Hepatic work-up negative February 2019, ferritin was noted elevated at 869 but with a normal iron profile, Fibrosure showed F0, S3, N1.  Patient does not drink alcohol.  Loose stool since gallbladder removal, patient has tried Florajen3 and noted it aggravated stools and he improved with discontinuing Florajen3; Colestid caused constipation.  Has had some relief with hyoscyamine. Pt also follows светлана Sawant, hx high TG since age 28.   Episode of diverticulitis in March 2020, symptoms also in May 2020 but testing negative  Pt has hx of long QT     Liver biopsy 9/13/2019: moderate steatosis  Colonoscopy 12/30/2019 for fecal urgency and loose stools: Adequate prep, diverticula in the left side of the colon, 2 mm polyp in the cecum completely removed--adenomatous, grade 1 internal hemorrhoids, random colon biopsies negative      2nd opinion светлана Mcdaniel in 2020, Dec 2020 LFTs returned normal, but this did not last.   4/5/2022: Hemochromatosis gene mutation was negative  FibroScan 12/9/2022: Moderate to severe liver fat, F2     Patient was last seen 8/10/2023 he had lost 20 pounds since being started on Wegovy patient had a pending EGD for persistent heartburn but did resolve with PPI - pt canceled EGD     Ultrasound of the liver 11/2/23 showed fatty liver and mild splenomegaly  CT abdomen and  pelvis stone protocol 7/19/2024 showed normal liver size but mild fatty liver spleen of normal size    LFTs have greatly improved this year.     Pt is trying to wean off Wegovy. He has lost 30# since last visit. He has occasional nausea, he has checked BP with this and it has been high - states has only happened 2 x, and he has notified his cardiologist.   No c/o HB - states takes Protonix PRN - states doesn't remember last time he took it.   Loose stools have improved.   Pt doesn't like coffee   Follows low carb diet. Exercise - 2 miles /d walking  Past Medical History:   Diagnosis Date    ADHD (attention deficit hyperactivity disorder) 1992    Anemia     Anxiety     Arthritis     Brain concussion     Brugada syndrome 11/16/2021    Chronic allergic rhinitis     CTS (carpal tunnel syndrome)     Depression 2004    Diverticulosis 2019    Essential hypertension     Family history of colon cancer in mother     Fatty liver     GERD (gastroesophageal reflux disease)     Gout     Headache     Hepatomegaly     HL (hearing loss) 2004    Hyperlipidemia     IBS (irritable bowel syndrome)     Ingrown toenail     Kidney stones     Low back pain 2022    Lumbosacral disc disease     Lyme disease 2021    Plantar fasciitis     Right bundle branch block (RBBB), 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)     Syncope        Past Surgical History:   Procedure Laterality Date    ADENOIDECTOMY  1993    CARDIAC CATHETERIZATION  2022    CARDIAC ELECTROPHYSIOLOGY PROCEDURE N/A 02/03/2022    Procedure: EPS + Procainimide Challenge +/- SICD (BSC), no meds to hold;  Surgeon: Lang Sorensen MD;  Location: [facility] INVASIVE LOCATION;  Service: Cardiology;  Laterality: N/A;    CHOLECYSTECTOMY  2017    Dr. Ray    COLONOSCOPY  2019    EPIDURAL BLOCK  May 2023    EYE SURGERY  2008    GALLBLADDER SURGERY      HERNIA REPAIR      INGUINAL HERNIA REPAIR  1984    LASIK      TOE SURGERY      TONSILLECTOMY      TRIGGER  "POINT INJECTION  June 2023    VASCULAR SURGERY  Feb 2022    VASECTOMY         Allergies   Allergen Reactions    Penicillins Anaphylaxis    Sulfamethoxazole-Trimethoprim Anaphylaxis and Rash     Other reaction(s): sore throat and ulcers    Colchicine Rash     PT stated body was burning       Family History   Problem Relation Age of Onset    Heart failure Mother         Heart attack in her early 50s    Hyperlipidemia Mother     Heart attack Mother     Hypertension Mother     Colon cancer Mother 53    Stroke Mother     Alcohol abuse Mother     Anxiety disorder Mother     Cancer Mother         colon    Developmental Disability Mother     Arrhythmia Mother     Heart disease Father     Alcohol abuse Father     Heart disease Sister     Lung cancer Maternal Uncle         50s    Heart disease Maternal Uncle     Alcohol abuse Maternal Uncle     Stroke Maternal Grandfather     Heart disease Maternal Grandfather     Heart attack Other     Heart disease Other     Heart failure Other     Hypertension Other     Hyperlipidemia Other         Social History     Tobacco Use    Smoking status: Never    Smokeless tobacco: Never    Tobacco comments:     second hand smoke exposure-never   Vaping Use    Vaping status: Never Used   Substance Use Topics    Alcohol use: Not Currently    Drug use: Never       Objective     Vital Signs:   /67 (BP Location: Left arm, Patient Position: Sitting, Cuff Size: Adult)   Pulse 81   Ht 182.9 cm (72\")   Wt 85.5 kg (188 lb 9.6 oz)   BMI 25.58 kg/m²       Physical Exam  Constitutional:       General: The patient is not in acute distress.     Appearance: Normal appearance.   HENT:      Head: Normocephalic and atraumatic.      Nose: Nose normal.   Pulmonary:      Effort: Pulmonary effort is normal. No respiratory distress.   Abdominal:      General: Abdomen is flat.      Palpations: Abdomen is soft. There is no mass.      Tenderness: There is no abdominal tenderness. There is no guarding. "   Musculoskeletal:      Cervical back: Neck supple.      Right lower leg: No edema.      Left lower leg: No edema.   Skin:     General: Skin is warm and dry.   Neurological:      General: No focal deficit present.      Mental Status: The patient is alert and oriented to person, place, and time.      Gait: Gait normal.   Psychiatric:         Mood and Affect: Mood normal.         Speech: Speech normal.         Behavior: Behavior normal.         Thought Content: Thought content normal.     Result Review :   The following data was reviewed by: NICOLE Estrada on 08/15/2024:    CBC w/diff          11/3/2023    08:22 4/12/2024    12:05   CBC w/Diff   WBC 5.27  6.27    RBC 4.61  5.39    Hemoglobin 13.4  15.3    Hematocrit 39.7  46.9    MCV 86.1  87.0    MCH 29.1  28.4    MCHC 33.8  32.6    RDW 13.0  13.2    Platelets 218  211    Neutrophil Rel % 44.4  59.0    Immature Granulocyte Rel % 0.2  0.0    Lymphocyte Rel % 46.7  33.2    Monocyte Rel % 7.2  6.2    Eosinophil Rel % 1.3  1.3    Basophil Rel % 0.2  0.3      CMP          11/3/2023    08:22 4/12/2024    12:05   CMP   Glucose 90  88    BUN 20  18    Creatinine 1.06  0.88    EGFR 89.3  109.4    Sodium 138  140    Potassium 4.2  4.6    Chloride 103  100    Calcium 9.8  9.8    Total Protein 7.2  7.4    Albumin 4.6  4.7    Globulin 2.6  2.7    Total Bilirubin 0.5  0.6    Alkaline Phosphatase 36  49    AST (SGOT) 20  23    ALT (SGPT) 16  17    Albumin/Globulin Ratio 1.8  1.7    BUN/Creatinine Ratio 18.9  20.5    Anion Gap 8.9  12.7                    Assessment and Plan    Diagnoses and all orders for this visit:    1. Fatty liver (Primary)  -     Liver Elastography; Future    2. History of colon polyps  -     Case Request; Standing  -     Case Request    3. Heartburn  -     Case Request; Standing  -     Case Request    4. Overweight (BMI 25.0-29.9)    Other orders  -     PEG-KCl-NaCl-NaSulf-Na Asc-C (Plenvu) 140 g reconstituted solution solution; Take 140 g by  mouth Take As Directed for 1 day. Take per office instructions  Dispense: 1 each; Refill: 0  -     Implement Anesthesia orders day of procedure.; Standing  -     Follow Anesthesia Guidelines / Protocol; Future  -     Obtain Informed Consent; Future  -     Verify NPO; Standing  -     Verify bowel prep was successful; Standing  -     Give tap water enema if bowel prep was insufficient; Standing  -     Obtain Informed Consent; Standing              Follow Up   Return in about 6 months (around 2/15/2025).  Fibroscan , if still with F2, d/w pt new med for fatty liver, Rezdiffra.   Cont low carb diet and weight loss, 2-4 c. Coffee/d (pt does not like coffee)  Pt will send me copy of [facility]   Colon due in Dec , + EGD for HB hx  Dr Sawant and Jamal  Patient was given instructions and counseling regarding his condition or for health maintenance advice. Please see specific information pulled into the AVS if appropriate.

## 2024-08-15 ENCOUNTER — OFFICE VISIT (OUTPATIENT)
Dept: GASTROENTEROLOGY | Facility: CLINIC | Age: 44
End: 2024-08-15
Payer: COMMERCIAL

## 2024-08-15 VITALS
BODY MASS INDEX: 25.55 KG/M2 | SYSTOLIC BLOOD PRESSURE: 117 MMHG | HEIGHT: 72 IN | DIASTOLIC BLOOD PRESSURE: 67 MMHG | WEIGHT: 188.6 LBS | HEART RATE: 81 BPM

## 2024-08-15 DIAGNOSIS — E66.3 OVERWEIGHT (BMI 25.0-29.9): ICD-10-CM

## 2024-08-15 DIAGNOSIS — R12 HEARTBURN: ICD-10-CM

## 2024-08-15 DIAGNOSIS — Z86.010 HISTORY OF COLON POLYPS: ICD-10-CM

## 2024-08-15 DIAGNOSIS — K76.0 FATTY LIVER: Primary | ICD-10-CM

## 2024-08-15 PROBLEM — Z86.0100 HISTORY OF COLON POLYPS: Status: ACTIVE | Noted: 2024-08-15

## 2024-08-15 RX ORDER — POLYETHYLENE GLYCOL 3350, SODIUM SULFATE, SODIUM CHLORIDE, POTASSIUM CHLORIDE, ASCORBIC ACID, SODIUM ASCORBATE 140-9-5.2G
1 KIT ORAL TAKE AS DIRECTED
Qty: 1 EACH | Refills: 0 | Status: SHIPPED | OUTPATIENT
Start: 2024-08-15 | End: 2024-08-16

## 2024-08-15 RX ORDER — SEMAGLUTIDE 1 MG/.5ML
INJECTION, SOLUTION SUBCUTANEOUS
COMMUNITY
Start: 2024-08-12

## 2024-09-04 ENCOUNTER — TELEPHONE (OUTPATIENT)
Dept: GASTROENTEROLOGY | Facility: CLINIC | Age: 44
End: 2024-09-04
Payer: COMMERCIAL

## 2024-09-04 NOTE — TELEPHONE ENCOUNTER
2024    Dear Dr Sawant,      Patient Name: Timi Hernandez  : 1980      This patient is waiting to have a Colonoscopy and/or Esophagogastroduodenoscopy which I will perform at [facility] on 24. Please respond to this request noting your recommendations regarding clearance from a Cardiac  standpoint.  You may contact our office at [phone] Option 2 with any questions. I appreciate your prompt response in this matter. Please return this form to our office as soon as possible to [phone].    ____ I approve my patient from a Cardiac  standpoint    ____ I do NOT approve my patient from a Cardiac  standpoint at this time    Please inform our office if the patient requires additional follow-up from your office prior to scheduled procedure date.      Please specify clearance expiration date:____________________________________      Approving physician name (please print): _____________________________________________      Approving physician signature: ________________________________ Date:________________  Sincerely,  [facility] - Gastroenterology   Dr. Mendez          Please fax approval or denial to our office as soon as possible.

## 2024-09-11 ENCOUNTER — TELEPHONE (OUTPATIENT)
Dept: OTHER | Facility: HOSPITAL | Age: 44
End: 2024-09-11
Payer: COMMERCIAL

## 2024-09-11 NOTE — TELEPHONE ENCOUNTER
Contacted pt reminded him of Fibroscan appt on 9/13/2024. Reviewed all instructions pt states understanding.

## 2024-09-13 ENCOUNTER — PROCEDURE VISIT (OUTPATIENT)
Dept: OTHER | Facility: HOSPITAL | Age: 44
End: 2024-09-13
Payer: COMMERCIAL

## 2024-09-13 DIAGNOSIS — K76.0 FATTY LIVER: ICD-10-CM

## 2024-09-13 PROCEDURE — 91200 LIVER ELASTOGRAPHY: CPT | Performed by: NURSE PRACTITIONER

## 2024-09-18 PROCEDURE — 93298 REM INTERROG DEV EVAL SCRMS: CPT | Performed by: INTERNAL MEDICINE

## 2024-10-07 ENCOUNTER — OFFICE VISIT (OUTPATIENT)
Dept: FAMILY MEDICINE CLINIC | Facility: CLINIC | Age: 44
End: 2024-10-07
Payer: COMMERCIAL

## 2024-10-07 VITALS
OXYGEN SATURATION: 100 % | DIASTOLIC BLOOD PRESSURE: 90 MMHG | WEIGHT: 185.3 LBS | BODY MASS INDEX: 25.1 KG/M2 | HEART RATE: 64 BPM | HEIGHT: 72 IN | SYSTOLIC BLOOD PRESSURE: 132 MMHG | TEMPERATURE: 97.9 F

## 2024-10-07 DIAGNOSIS — J01.40 ACUTE NON-RECURRENT PANSINUSITIS: Primary | ICD-10-CM

## 2024-10-07 DIAGNOSIS — R09.81 HEAD CONGESTION: ICD-10-CM

## 2024-10-07 PROBLEM — I10 ESSENTIAL HYPERTENSION: Status: ACTIVE | Noted: 2024-10-07

## 2024-10-07 PROBLEM — R00.0 SINUS TACHYCARDIA: Status: ACTIVE | Noted: 2024-10-07

## 2024-10-07 PROCEDURE — 99213 OFFICE O/P EST LOW 20 MIN: CPT

## 2024-10-07 PROCEDURE — 87428 SARSCOV & INF VIR A&B AG IA: CPT

## 2024-10-07 PROCEDURE — 96372 THER/PROPH/DIAG INJ SC/IM: CPT

## 2024-10-07 RX ORDER — AZITHROMYCIN 250 MG/1
TABLET, FILM COATED ORAL
Qty: 6 TABLET | Refills: 0 | Status: SHIPPED | OUTPATIENT
Start: 2024-10-07

## 2024-10-07 RX ORDER — METHYLPREDNISOLONE ACETATE 80 MG/ML
80 INJECTION, SUSPENSION INTRA-ARTICULAR; INTRALESIONAL; INTRAMUSCULAR; SOFT TISSUE ONCE
Status: COMPLETED | OUTPATIENT
Start: 2024-10-07 | End: 2024-10-07

## 2024-10-07 RX ADMIN — METHYLPREDNISOLONE ACETATE 80 MG: 80 INJECTION, SUSPENSION INTRA-ARTICULAR; INTRALESIONAL; INTRAMUSCULAR; SOFT TISSUE at 14:49

## 2024-10-07 NOTE — PROGRESS NOTES
"Timi Hernandez presents to [facility] with complaints of upper respiratory symptoms.      History of Present Illness  This is a 44-year-old male who presents to the clinic with complaints of upper respiratory symptoms.    Patient states that his symptoms started on Saturday, states with it started with a lot of upper respiratory symptoms, specifically congestion, then he just darted persistently having all of his nasal drainage that is this morning turned green in color.  Patient states that it has been persistent, he is even tried taking over-the-counter medications and it will give him short-term relief but not full relief.  He is notices starting to move into his chest a little bit, has had some drainage/irritation of his throat and has had a few episodes of coughing.  States that he has noticed some fullness in both of his ears, and decreased hearing as well.  Thus decided to come in.  Does have history of severe allergies specifically to ragweed and other things, but does get allergy injections weekly so he should not have these issues but is continuously having them.  Patient states that has not had any shortness of breath, no wheezing, no chest pain.  No fever/chills/body aches.  Patient was negative for COVID at home.    The following portions of the patient's history were personally reviewed and updated as appropriate: allergies, current medications, past medical history, past surgical history, past family history, and past social history.       Objective   Vital Signs:   /90 (BP Location: Left arm, Patient Position: Sitting, Cuff Size: Adult)   Pulse 64   Temp 97.9 °F (36.6 °C)   Ht 182.9 cm (72.01\")   Wt 84.1 kg (185 lb 4.8 oz)   SpO2 100%   BMI 25.13 kg/m²     Body mass index is 25.13 kg/m².    All labs, imaging, test results, and specialty provider notes reviewed with patient.     Physical Exam  Vitals reviewed.   Constitutional:       Appearance: Normal " appearance.   Cardiovascular:      Rate and Rhythm: Normal rate and regular rhythm.      Pulses: Normal pulses.      Heart sounds: Normal heart sounds.   Pulmonary:      Effort: Pulmonary effort is normal.      Breath sounds: Normal breath sounds.   Neurological:      General: No focal deficit present.      Mental Status: He is alert and oriented to person, place, and time.                               Assessment and Plan:  Diagnoses and all orders for this visit:    1. Acute non-recurrent pansinusitis (Primary)  -     azithromycin (Zithromax Z-Farzad) 250 MG tablet; Take 2 tablets by mouth on day 1, then 1 tablet daily on days 2-5  Dispense: 6 tablet; Refill: 0  -     methylPREDNISolone acetate (DEPO-medrol) injection 80 mg    2. Head congestion  -     POCT SARS-CoV-2 + Flu Antigen YANCI      Patient negative for COVID and flu in the office, likely sinus infection that is driving patient's symptoms.  Will treat with Z-Farzad, get steroid injection today to help with inflammation and congestion, patient to continue to take allergy medications over-the-counter, may continue to use decongestant, and if not improving after antibiotic may need to consider further workup at that point.  Patient would also benefit discussing with his allergist about possibly changing serums as he is having more breakthrough infections    Follow Up:  No follow-ups on file.    Patient was given instructions and counseling regarding his condition or for health maintenance advice. Please see specific information pulled into the AVS if appropriate.

## 2024-10-08 ENCOUNTER — OFFICE VISIT (OUTPATIENT)
Dept: CARDIOLOGY | Facility: CLINIC | Age: 44
End: 2024-10-08
Payer: COMMERCIAL

## 2024-10-08 VITALS
HEART RATE: 86 BPM | WEIGHT: 182 LBS | SYSTOLIC BLOOD PRESSURE: 120 MMHG | DIASTOLIC BLOOD PRESSURE: 72 MMHG | HEIGHT: 72 IN | OXYGEN SATURATION: 98 % | BODY MASS INDEX: 24.65 KG/M2

## 2024-10-08 DIAGNOSIS — R55 SYNCOPE AND COLLAPSE: Primary | ICD-10-CM

## 2024-10-08 DIAGNOSIS — R00.0 SINUS TACHYCARDIA: Chronic | ICD-10-CM

## 2024-10-08 DIAGNOSIS — I49.8 BRUGADA SYNDROME: Chronic | ICD-10-CM

## 2024-10-08 DIAGNOSIS — I45.81 LONG Q-T SYNDROME: Chronic | ICD-10-CM

## 2024-10-08 DIAGNOSIS — I10 ESSENTIAL HYPERTENSION: Chronic | ICD-10-CM

## 2024-10-08 PROCEDURE — 93291 INTERROG DEV EVAL SCRMS IP: CPT

## 2024-10-08 PROCEDURE — 99214 OFFICE O/P EST MOD 30 MIN: CPT

## 2024-10-08 RX ORDER — PROPRANOLOL HCL 10 MG
10 TABLET ORAL 3 TIMES DAILY
Qty: 270 TABLET | Refills: 0 | Status: SHIPPED | OUTPATIENT
Start: 2024-10-08

## 2024-10-08 NOTE — PROGRESS NOTES
Timi Hernandez  [id number]  1980  [phone]      10/08/2024      [facility]     Referring Provider: No ref. provider found     Clemencia Salas, APRN  [address] [address]    Chief Complaint   Patient presents with    Brugada syndrome       Problem List   Mixed genotype Long QT 3 syndrome/Brugada Syndrome  Uncle with reported long QT syndrome. Grandfather with sudden cardiac death at age 36   Echo 3/29/2021 ejection fraction 55%, no significant valvular heart issues.  Stress Test 3/29/2021 good exercise tolerance, and exercise EKG negative for ischemia, atypical chest pain and abdominal pain with exercise, no arrhythmias seen.  Study is consistent with no reversible ischemia at rest or with exercise.  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 No VT. Less than 1% PACs, no PVCs   EP study 2/3/2022 but no inducible ventricular tachycardia and negative procainamide challenge test.  Normal QT interval in baseline state with appropriate shortening during atrial pacing and isoproterenol.  Normal HV interval   Neg TTT 4/19/2022  Loop recorder implant 7/29/2022   RBBB  HTN  HLD   SANCHEZ   GERD       History of Present Illness   Timi Hernandez is a 44 y.o. male who presents to my electrophysiology clinic for follow up of  tachypalpitations, SOB, LH, and dizziness. Since we last saw the patient, he has continued to have episodes of high  "heart rates, chest heaviness every day between 12-4 pm. He thinks this is related to anxiety. He is limited by anxiety medication due to QT prolongation. He takes klonipin as needed which helps. Over the last several months he has lost almost 100 lbs and walks 2 miles daily. He has tried going off of Vyvanse and caffeine without relief. Loop shows no episodes.     Outpatient Medications Marked as Taking for the 10/8/24 encounter (Office Visit) with Abram Gilbert MD   Medication Sig Dispense Refill    aspirin 81 MG EC tablet Take 1 tablet by mouth Daily.      azithromycin (Zithromax Z-Farzad) 250 MG tablet Take 2 tablets by mouth on day 1, then 1 tablet daily on days 2-5 6 tablet 0    cetirizine (zyrTEC) 10 MG tablet Zyrtec 10 mg oral tablet take 1 tablet (10 mg) by oral route once daily   Active      clonazePAM (KlonoPIN) 1 MG tablet Take 1 tablet by mouth As Needed.      pantoprazole (PROTONIX) 40 MG EC tablet       Vyvanse 30 MG capsule Take 1 capsule by mouth Daily      Wegovy 1 MG/0.5ML solution auto-injector        Current Facility-Administered Medications for the 10/8/24 encounter (Office Visit) with Abram Gilbert MD   Medication Dose Route Frequency Provider Last Rate Last Admin    bupivacaine (MARCAINE) 0.25 % injection 10 mL  10 mL Injection Once Halie Rodríguez, NICOLE                Physical Exam  Vitals:    10/08/24 1419   BP: 120/72   BP Location: Left arm   Patient Position: Sitting   Pulse: 86   SpO2: 98%   Weight: 82.6 kg (182 lb)   Height: 182.9 cm (72\")     Body mass index is 24.68 kg/m².  Constitutional:       Appearance: Healthy appearance.   Neck:      Vascular: JVD normal.   Pulmonary:      Effort: Pulmonary effort is normal.      Breath sounds: Normal breath sounds.   Cardiovascular:      Normal rate. Regular rhythm. Normal S1. Normal S2.       Murmurs: There is no murmur.      No gallop.  No rub.   Pulses:     Intact distal pulses.   Edema:     Peripheral edema absent.   Neurological:      " General: No focal deficit present.          Diagnostic Data  Procedures    Lab Results   Component Value Date    GLUCOSE 88 04/12/2024    CALCIUM 9.8 04/12/2024     04/12/2024    K 4.6 04/12/2024    CO2 27.3 04/12/2024     04/12/2024    BUN 18 04/12/2024    CREATININE 0.88 04/12/2024    EGFRIFNONA 91 02/03/2022    BCR 20.5 04/12/2024    ANIONGAP 12.7 04/12/2024     Lab Results   Component Value Date    WBC 6.27 04/12/2024    HGB 15.3 04/12/2024    HCT 46.9 04/12/2024    MCV 87.0 04/12/2024     04/12/2024     Lab Results   Component Value Date    INR 0.98 06/06/2023    INR 1.08 05/20/2022    INR 1.05 (L) 02/07/2022    PROTIME 13.1 06/06/2023    PROTIME 14.1 05/20/2022    PROTIME 10.9 02/07/2022     Lab Results   Component Value Date    TSH 0.956 04/12/2024    THYROIDAB <8 08/31/2022       I personally viewed and interpreted the patient's EKG/Telemetry/lab data    Timi Hernandez  reports that he has never smoked. He has never used smokeless tobacco. I have educated him on the risk of diseases from using tobacco products such as cancer, COPD, and heart disease.            Assessment and Plan  Diagnoses and all orders for this visit:    1. Syncope and collapse (Primary)    2. Sinus tachycardia    3. Long Q-T syndrome    4. Brugada syndrome    5. Essential hypertension    Other orders  -     propranolol (INDERAL) 10 MG tablet; Take 1 tablet by mouth 3 (Three) Times a Day.  Dispense: 270 tablet; Refill: 0      Syncope   -No recent syncopal episode  - Extensive monitoring during with his implant recorder as well as extensive EP studies demonstrate no significant arrhythmias noted.    -Loop recorder today shows no episodes. Battery ok. NSR at 70.   - discontinued Nadolol 11/2022 with resolution of his syncopal episodes.       Sinus tachycardia   -Patient states tachycardia is related to anxiety. Will try propanolol 10 mg up to three times daily for anxiety. Asked patient to monitor BP and HR.      Mixed  Long QT 3 Syndrome/Brugada Syndrome  -Extensive work-up performed. QT interval stable overall.      Hypertension   -Allow for permissive hypertension so it does not worsen episodes of syncope.       Follow-Up  Return in about 7 months (around 5/8/2025) for with [facility] device check.      Thank you for allowing me to participate in the care of your patient. Please to not hesitate to contact me with additional questions or concerns.     NICOLE Garcia

## 2024-10-15 ENCOUNTER — LAB (OUTPATIENT)
Dept: LAB | Facility: HOSPITAL | Age: 44
End: 2024-10-15
Payer: COMMERCIAL

## 2024-10-15 ENCOUNTER — OFFICE VISIT (OUTPATIENT)
Dept: FAMILY MEDICINE CLINIC | Facility: CLINIC | Age: 44
End: 2024-10-15
Payer: COMMERCIAL

## 2024-10-15 VITALS
WEIGHT: 185.1 LBS | BODY MASS INDEX: 25.07 KG/M2 | HEIGHT: 72 IN | SYSTOLIC BLOOD PRESSURE: 140 MMHG | TEMPERATURE: 98.3 F | DIASTOLIC BLOOD PRESSURE: 90 MMHG | HEART RATE: 80 BPM | OXYGEN SATURATION: 97 %

## 2024-10-15 DIAGNOSIS — Z00.00 ANNUAL PHYSICAL EXAM: ICD-10-CM

## 2024-10-15 DIAGNOSIS — F33.1 MODERATE EPISODE OF RECURRENT MAJOR DEPRESSIVE DISORDER: ICD-10-CM

## 2024-10-15 DIAGNOSIS — E78.2 MIXED HYPERLIPIDEMIA: ICD-10-CM

## 2024-10-15 DIAGNOSIS — R53.83 FATIGUE, UNSPECIFIED TYPE: ICD-10-CM

## 2024-10-15 DIAGNOSIS — E55.9 VITAMIN D DEFICIENCY: ICD-10-CM

## 2024-10-15 DIAGNOSIS — F41.9 ANXIETY: ICD-10-CM

## 2024-10-15 DIAGNOSIS — F90.2 ATTENTION DEFICIT HYPERACTIVITY DISORDER (ADHD), COMBINED TYPE: ICD-10-CM

## 2024-10-15 DIAGNOSIS — Z00.00 ANNUAL PHYSICAL EXAM: Primary | ICD-10-CM

## 2024-10-15 LAB
25(OH)D3 SERPL-MCNC: 28 NG/ML (ref 30–100)
ALBUMIN SERPL-MCNC: 4.6 G/DL (ref 3.5–5.2)
ALBUMIN/GLOB SERPL: 1.6 G/DL
ALP SERPL-CCNC: 51 U/L (ref 39–117)
ALT SERPL W P-5'-P-CCNC: 16 U/L (ref 1–41)
ANION GAP SERPL CALCULATED.3IONS-SCNC: 4.6 MMOL/L (ref 5–15)
AST SERPL-CCNC: 18 U/L (ref 1–40)
BASOPHILS # BLD AUTO: 0.02 10*3/MM3 (ref 0–0.2)
BASOPHILS NFR BLD AUTO: 0.3 % (ref 0–1.5)
BILIRUB SERPL-MCNC: 0.5 MG/DL (ref 0–1.2)
BILIRUB UR QL STRIP: NEGATIVE
BUN SERPL-MCNC: 16 MG/DL (ref 6–20)
BUN/CREAT SERPL: 15.8 (ref 7–25)
CALCIUM SPEC-SCNC: 9.7 MG/DL (ref 8.6–10.5)
CHLORIDE SERPL-SCNC: 103 MMOL/L (ref 98–107)
CHOLEST SERPL-MCNC: 202 MG/DL (ref 0–200)
CLARITY UR: CLEAR
CO2 SERPL-SCNC: 29.4 MMOL/L (ref 22–29)
COLOR UR: YELLOW
CREAT SERPL-MCNC: 1.01 MG/DL (ref 0.76–1.27)
DEPRECATED RDW RBC AUTO: 41.2 FL (ref 37–54)
EGFRCR SERPLBLD CKD-EPI 2021: 94 ML/MIN/1.73
EOSINOPHIL # BLD AUTO: 0.09 10*3/MM3 (ref 0–0.4)
EOSINOPHIL NFR BLD AUTO: 1.2 % (ref 0.3–6.2)
ERYTHROCYTE [DISTWIDTH] IN BLOOD BY AUTOMATED COUNT: 13 % (ref 12.3–15.4)
FOLATE SERPL-MCNC: 5.86 NG/ML (ref 4.78–24.2)
GLOBULIN UR ELPH-MCNC: 2.8 GM/DL
GLUCOSE SERPL-MCNC: 100 MG/DL (ref 65–99)
GLUCOSE UR STRIP-MCNC: NEGATIVE MG/DL
HCT VFR BLD AUTO: 42.7 % (ref 37.5–51)
HDLC SERPL-MCNC: 35 MG/DL (ref 40–60)
HGB BLD-MCNC: 15.4 G/DL (ref 13–17.7)
HGB UR QL STRIP.AUTO: NEGATIVE
HOLD SPECIMEN: NORMAL
IMM GRANULOCYTES # BLD AUTO: 0.05 10*3/MM3 (ref 0–0.05)
IMM GRANULOCYTES NFR BLD AUTO: 0.7 % (ref 0–0.5)
KETONES UR QL STRIP: NEGATIVE
LDLC SERPL CALC-MCNC: 125 MG/DL (ref 0–100)
LDLC/HDLC SERPL: 3.43 {RATIO}
LEUKOCYTE ESTERASE UR QL STRIP.AUTO: NEGATIVE
LYMPHOCYTES # BLD AUTO: 2.53 10*3/MM3 (ref 0.7–3.1)
LYMPHOCYTES NFR BLD AUTO: 34.7 % (ref 19.6–45.3)
MCH RBC QN AUTO: 31.6 PG (ref 26.6–33)
MCHC RBC AUTO-ENTMCNC: 36.1 G/DL (ref 31.5–35.7)
MCV RBC AUTO: 87.7 FL (ref 79–97)
MONOCYTES # BLD AUTO: 0.44 10*3/MM3 (ref 0.1–0.9)
MONOCYTES NFR BLD AUTO: 6 % (ref 5–12)
NEUTROPHILS NFR BLD AUTO: 4.16 10*3/MM3 (ref 1.7–7)
NEUTROPHILS NFR BLD AUTO: 57.1 % (ref 42.7–76)
NITRITE UR QL STRIP: NEGATIVE
NRBC BLD AUTO-RTO: 0 /100 WBC (ref 0–0.2)
PH UR STRIP.AUTO: 5.5 [PH] (ref 5–8)
PLATELET # BLD AUTO: 201 10*3/MM3 (ref 140–450)
PMV BLD AUTO: 10.4 FL (ref 6–12)
POTASSIUM SERPL-SCNC: 4.3 MMOL/L (ref 3.5–5.2)
PROT SERPL-MCNC: 7.4 G/DL (ref 6–8.5)
PROT UR QL STRIP: NEGATIVE
RBC # BLD AUTO: 4.87 10*6/MM3 (ref 4.14–5.8)
SODIUM SERPL-SCNC: 137 MMOL/L (ref 136–145)
SP GR UR STRIP: 1.02 (ref 1–1.03)
TRIGL SERPL-MCNC: 235 MG/DL (ref 0–150)
TSH SERPL DL<=0.05 MIU/L-ACNC: 2.11 UIU/ML (ref 0.27–4.2)
UROBILINOGEN UR QL STRIP: NORMAL
VIT B12 BLD-MCNC: 497 PG/ML (ref 211–946)
VLDLC SERPL-MCNC: 42 MG/DL (ref 5–40)
WBC NRBC COR # BLD AUTO: 7.29 10*3/MM3 (ref 3.4–10.8)

## 2024-10-15 PROCEDURE — 82746 ASSAY OF FOLIC ACID SERUM: CPT

## 2024-10-15 PROCEDURE — 99214 OFFICE O/P EST MOD 30 MIN: CPT

## 2024-10-15 PROCEDURE — 36415 COLL VENOUS BLD VENIPUNCTURE: CPT

## 2024-10-15 PROCEDURE — 99396 PREV VISIT EST AGE 40-64: CPT

## 2024-10-15 PROCEDURE — 82306 VITAMIN D 25 HYDROXY: CPT

## 2024-10-15 PROCEDURE — 80061 LIPID PANEL: CPT

## 2024-10-15 PROCEDURE — 80050 GENERAL HEALTH PANEL: CPT

## 2024-10-15 PROCEDURE — 81003 URINALYSIS AUTO W/O SCOPE: CPT

## 2024-10-15 PROCEDURE — 82607 VITAMIN B-12: CPT

## 2024-10-15 NOTE — ASSESSMENT & PLAN NOTE
Not controlled.  Patient is tried and failed several medications in the past to include Zoloft, Cymbalta, Lexapro, Wellbutrin, Pristiq.  Discussed with patient that I would reach out to his psychiatrist who he sees on Thursday, do really think patient would benefit from Trintellix from an anxiety and depression standpoint but also will not have the adverse effects from a sexual standpoint.  Could also consider going back on Prozac and list the reason it was discontinued is for an adverse effect.  Patient to reach out to me and let me know if that is something that he is wanting to go through with.  Strict ER precautions for any suicidal thoughts or ideation.  Continue as needed clonazepam

## 2024-10-15 NOTE — PROGRESS NOTES
Timi Hernandez presents to [facility] who presents to clinic for annual physical.      History of Present Illness  This is a 44-year-old male who presents to the clinic for annual physical.    Anxiety/depression/ADHD: Patient states that he has noticed over the past several months that his anxiety has been definitely less control.  He does continue to follow-up with psychiatry, actually sees them later this week, but states that he almost wonders if this is not the root cause of a lot of his other symptoms as well.  We have been fighting and struggling to try to figure out a reason as to why he has this worsening generalized joint pain at times that comes and flares, we have done quite a bit of testing, but he is almost wondering if may be anxiety driven.  He has been on several medications in the past, some of those to include Zoloft, Cymbalta, Lexapro, Wellbutrin, Prozac, and Pristiq.  Most of those medications had an adverse effect from a sexual standpoint, or they were not effective.  States that the Prozac did work well for him but he just cannot remember why he came off of that medication.  Patient states that he is kind of at a loss moving forward as to what else to try he has mentioned Trintellix in the past but his cardiologist at that time stated he did not want him on that medication, but was never told why.  Patient states that he just notices it to anxiety and depression are present in his everyday life, it does affect his quality life, and is wondering if a daily medication would be beneficial.  Denies suicidal thoughts or ideation.  Remains on clonazepam only as needed, and does take Vyvanse for ADHD.    Patient states that he was also recently started on propranolol via his cardiologist because his heart rate was maintaining in the 80s to 100s pretty consistently.  He was told by his cardiologist nothing else to really worry about in regards to that, but states he  "does not feel like the propranolol has really done anything and it was also going to be there for anxiety as well.  States that he is not having any major issues, no heart palpitations, no shortness of breath, no dizziness or lightheadedness, but does follow-up with his other cardiologist and November.    Patient due for annual blood work, will order today.    The following portions of the patient's history were personally reviewed and updated as appropriate: allergies, current medications, past medical history, past surgical history, past family history, and past social history.       Objective   Vital Signs:   /90 (BP Location: Left arm, Patient Position: Sitting, Cuff Size: Adult)   Pulse 80   Temp 98.3 °F (36.8 °C)   Ht 182.9 cm (72.01\")   Wt 84 kg (185 lb 1.6 oz)   SpO2 97%   BMI 25.10 kg/m²     Body mass index is 25.1 kg/m².    All labs, imaging, test results, and specialty provider notes reviewed with patient.     Physical Exam  Vitals reviewed.   Constitutional:       Appearance: Normal appearance.   Cardiovascular:      Rate and Rhythm: Normal rate and regular rhythm.      Pulses: Normal pulses.      Heart sounds: Normal heart sounds.   Pulmonary:      Effort: Pulmonary effort is normal.      Breath sounds: Normal breath sounds.   Neurological:      General: No focal deficit present.      Mental Status: He is alert and oriented to person, place, and time.                               Assessment and Plan:  Diagnoses and all orders for this visit:    1. Annual physical exam (Primary)  -     CBC Auto Differential; Future  -     Comprehensive Metabolic Panel; Future  -     TSH Rfx On Abnormal To Free T4; Future  -     Urinalysis With Culture If Indicated -; Future    2. Mixed hyperlipidemia  -     Lipid Panel; Future    3. Vitamin D deficiency  -     Vitamin D,25-Hydroxy; Future    4. Fatigue, unspecified type  -     Vitamin B12 & Folate; Future    5. Anxiety  Assessment & Plan:  Not controlled.  " Patient is tried and failed several medications in the past to include Zoloft, Cymbalta, Lexapro, Wellbutrin, Pristiq.  Discussed with patient that I would reach out to his psychiatrist who he sees on Thursday, do really think patient would benefit from Trintellix from an anxiety and depression standpoint but also will not have the adverse effects from a sexual standpoint.  Could also consider going back on Prozac and list the reason it was discontinued is for an adverse effect.  Patient to reach out to me and let me know if that is something that he is wanting to go through with.  Strict ER precautions for any suicidal thoughts or ideation.  Continue as needed clonazepam      6. Moderate episode of recurrent major depressive disorder    7. Attention deficit hyperactivity disorder (ADHD), combined type      Anticipatory Guidelines discussed with patient.    Discussed getting adequate exercise, reduced TV/electronic time, car safety including seat belt use, sexual activity (if applicable), and smoking/alcohol use (if applicable).    Follow Up:  No follow-ups on file.    Patient was given instructions and counseling regarding his condition or for health maintenance advice. Please see specific information pulled into the AVS if appropriate.

## 2024-10-23 PROCEDURE — 93298 REM INTERROG DEV EVAL SCRMS: CPT | Performed by: INTERNAL MEDICINE

## 2024-11-25 ENCOUNTER — OFFICE VISIT (OUTPATIENT)
Dept: CARDIOLOGY | Facility: CLINIC | Age: 44
End: 2024-11-25
Payer: COMMERCIAL

## 2024-11-25 VITALS
WEIGHT: 184 LBS | HEART RATE: 83 BPM | BODY MASS INDEX: 24.92 KG/M2 | DIASTOLIC BLOOD PRESSURE: 86 MMHG | HEIGHT: 72 IN | SYSTOLIC BLOOD PRESSURE: 126 MMHG

## 2024-11-25 DIAGNOSIS — I45.81 LONG Q-T SYNDROME: ICD-10-CM

## 2024-11-25 DIAGNOSIS — E78.5 DYSLIPIDEMIA: ICD-10-CM

## 2024-11-25 DIAGNOSIS — I49.8 BRUGADA SYNDROME: ICD-10-CM

## 2024-11-25 DIAGNOSIS — R55 SYNCOPE AND COLLAPSE: ICD-10-CM

## 2024-11-25 DIAGNOSIS — I45.3: Primary | ICD-10-CM

## 2024-11-25 PROCEDURE — 99214 OFFICE O/P EST MOD 30 MIN: CPT | Performed by: INTERNAL MEDICINE

## 2024-11-25 RX ORDER — PROPRANOLOL HYDROCHLORIDE 60 MG/1
60 CAPSULE, EXTENDED RELEASE ORAL DAILY
Qty: 90 CAPSULE | Refills: 3 | Status: SHIPPED | OUTPATIENT
Start: 2024-11-25

## 2024-11-25 NOTE — ASSESSMENT & PLAN NOTE
Patient with significant improvement in his cholesterol panels with weight loss still with some borderline dyslipidemia does have a family history of CAD recommended checking a lipoprotein a for further evaluation and rescreening to decide about possible statin treatment for risk reduction.  Also patient was interested in going ahead and getting calcium score as well for further risk assessment

## 2024-11-25 NOTE — ASSESSMENT & PLAN NOTE
Patient was not tolerant to nadolol due to syncopal episodes suspect related to hypotension he had been tried on propranolol due to his anxiety symptoms as well but 3 times a day medication was difficult recommended trying the extended release propranolol once a day at 60 mg a day we will see how he tolerates this

## 2024-11-25 NOTE — PROGRESS NOTES
Chief Complaint  Follow-up, Syncope and collapse, and Dyslipidemia    Subjective    Patient has been doing well his weight remains down and a cholesterol panel has improved.  He denies any issues with recurrent syncope he has been suffering from elevated heart rate as well as symptoms symptoms anxiety his resting heart rate at times can be in the 100 120 range he did try to take the propranolol but had difficulty taking 3 times a day.  Past Medical History:   Diagnosis Date    ADHD (attention deficit hyperactivity disorder) 1992    Anemia     Anxiety     Arthritis     Brain concussion     Brugada syndrome 11/16/2021    Chronic allergic rhinitis     CTS (carpal tunnel syndrome)     Depression 2004    Diverticulosis 2019    Essential hypertension     Family history of colon cancer in mother     Fatty liver     GERD (gastroesophageal reflux disease)     Gout     Headache     Hepatomegaly     HL (hearing loss) 2004    Hyperlipidemia     IBS (irritable bowel syndrome)     Ingrown toenail     Kidney stones     Low back pain 2022    Lumbosacral disc disease     Lyme disease 2021    Plantar fasciitis     Right bundle branch block (RBBB), 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)     Syncope          Current Outpatient Medications:     aspirin 81 MG EC tablet, Take 1 tablet by mouth Daily., Disp: , Rfl:     cetirizine (zyrTEC) 10 MG tablet, Zyrtec 10 mg oral tablet take 1 tablet (10 mg) by oral route once daily   Active, Disp: , Rfl:     clonazePAM (KlonoPIN) 1 MG tablet, Take 1 tablet by mouth As Needed., Disp: , Rfl:     pantoprazole (PROTONIX) 40 MG EC tablet, , Disp: , Rfl:     Vyvanse 30 MG capsule, Take 1 capsule by mouth Daily, Disp: , Rfl:     Wegovy 1 MG/0.5ML solution auto-injector, , Disp: , Rfl:     propranolol LA (Inderal LA) 60 MG 24 hr capsule, Take 1 capsule by mouth Daily., Disp: 90 capsule, Rfl: 3    Current Facility-Administered Medications:     bupivacaine (MARCAINE) 0.25 %  "injection 10 mL, 10 mL, Injection, Once, Halie Rodríguez APRN    Medications Discontinued During This Encounter   Medication Reason    propranolol (INDERAL) 10 MG tablet      Allergies   Allergen Reactions    Penicillins Anaphylaxis    Sulfamethoxazole-Trimethoprim Anaphylaxis and Rash     Other reaction(s): sore throat and ulcers    Colchicine Rash     PT stated body was burning        Social History     Tobacco Use    Smoking status: Never    Smokeless tobacco: Never    Tobacco comments:     second hand smoke exposure-never   Vaping Use    Vaping status: Never Used   Substance Use Topics    Alcohol use: Not Currently    Drug use: Never       Family History   Problem Relation Age of Onset    Heart failure Mother         Heart attack in her early 50s    Hyperlipidemia Mother     Heart attack Mother     Hypertension Mother     Colon cancer Mother 53    Stroke Mother     Alcohol abuse Mother     Anxiety disorder Mother     Cancer Mother         colon    Developmental Disability Mother     Arrhythmia Mother     Heart disease Father     Alcohol abuse Father     Heart disease Sister     Lung cancer Maternal Uncle         50s    Heart disease Maternal Uncle         Cardiomyopathy    Alcohol abuse Maternal Uncle     Stroke Maternal Grandfather     Heart disease Maternal Grandfather         Cardiac arrest Liu of the arteries    Heart attack Other     Heart disease Other     Heart failure Other     Hypertension Other     Hyperlipidemia Other         Objective     /86   Pulse 83   Ht 182.9 cm (72.01\")   Wt 83.5 kg (184 lb)   BMI 24.95 kg/m²       Physical Exam    General Appearance:   no acute distress  Alert and oriented x3  HENT:   lips not cyanotic  Atraumatic  Neck:  No jvd   supple  Respiratory:  no respiratory distress  normal breath sounds  no rales  Cardiovascular:  Regular rate and rhythm  no S3, no S4   no murmur  no rub  Extremities  No cyanosis  lower extremity edema: none    Skin:   warm, dry  No " "brayden      Result Review :     No results found for: \"PROBNP\"  CMP          4/12/2024    12:05 10/15/2024    09:42   CMP   Glucose 88  100    BUN 18  16    Creatinine 0.88  1.01    EGFR 109.4  94.0    Sodium 140  137    Potassium 4.6  4.3    Chloride 100  103    Calcium 9.8  9.7    Total Protein 7.4  7.4    Albumin 4.7  4.6    Globulin 2.7  2.8    Total Bilirubin 0.6  0.5    Alkaline Phosphatase 49  51    AST (SGOT) 23  18    ALT (SGPT) 17  16    Albumin/Globulin Ratio 1.7  1.6    BUN/Creatinine Ratio 20.5  15.8    Anion Gap 12.7  4.6      CBC w/diff          4/12/2024    12:05 10/15/2024    09:42   CBC w/Diff   WBC 6.27  7.29    RBC 5.39  4.87    Hemoglobin 15.3  15.4    Hematocrit 46.9  42.7    MCV 87.0  87.7    MCH 28.4  31.6    MCHC 32.6  36.1    RDW 13.2  13.0    Platelets 211  201    Neutrophil Rel % 59.0  57.1    Immature Granulocyte Rel % 0.0  0.7    Lymphocyte Rel % 33.2  34.7    Monocyte Rel % 6.2  6.0    Eosinophil Rel % 1.3  1.2    Basophil Rel % 0.3  0.3       Lab Results   Component Value Date    TSH 2.110 10/15/2024      Lab Results   Component Value Date    FREET4 1.20 03/01/2022      No results found for: \"DDIMERQUANT\"  Magnesium   Date Value Ref Range Status   04/12/2024 2.3 1.6 - 2.6 mg/dL Final      No results found for: \"DIGOXIN\"   No results found for: \"TROPONINT\"        Lipid Panel          4/12/2024    12:05 10/15/2024    09:42   Lipid Panel   Total Cholesterol 200  202    Triglycerides 155  235    HDL Cholesterol 34  35    VLDL Cholesterol 28  42    LDL Cholesterol  138  125    LDL/HDL Ratio 3.97  3.43      No results found for: \"POCTROP\"                   Diagnoses and all orders for this visit:    1. Right bundle branch block (RBBB), 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) (Primary)    2. Long Q-T syndrome  Assessment & Plan:  Patient was not tolerant to nadolol due to syncopal episodes suspect related to hypotension he had been tried on propranolol due " to his anxiety symptoms as well but 3 times a day medication was difficult recommended trying the extended release propranolol once a day at 60 mg a day we will see how he tolerates this    Orders:  -     propranolol LA (Inderal LA) 60 MG 24 hr capsule; Take 1 capsule by mouth Daily.  Dispense: 90 capsule; Refill: 3    3. Brugada syndrome  Assessment & Plan:  No severe dysrhythmias noted on monitoring so far with implantable loop recorder previous EP testing did not show any inducible dysrhythmias continue to monitor implantable recorder      4. Syncope and collapse  Assessment & Plan:  Felt to be orthostatic symptoms no dysrhythmias noted with implantable recorder      5. Dyslipidemia  Assessment & Plan:  Patient with significant improvement in his cholesterol panels with weight loss still with some borderline dyslipidemia does have a family history of CAD recommended checking a lipoprotein a for further evaluation and rescreening to decide about possible statin treatment for risk reduction.  Also patient was interested in going ahead and getting calcium score as well for further risk assessment    Orders:  -     Lipoprotein A (LPA); Future  -     CT Cardiac Calcium Score Without Dye; Future            Follow Up     Return in about 6 months (around 5/25/2025) for EKG with F/U.          Patient was given instructions and counseling regarding his condition or for health maintenance advice. Please see specific information pulled into the AVS if appropriate.

## 2024-11-25 NOTE — ASSESSMENT & PLAN NOTE
No severe dysrhythmias noted on monitoring so far with implantable loop recorder previous EP testing did not show any inducible dysrhythmias continue to monitor implantable recorder

## 2024-11-26 ENCOUNTER — LAB (OUTPATIENT)
Facility: HOSPITAL | Age: 44
End: 2024-11-26
Payer: COMMERCIAL

## 2024-11-26 DIAGNOSIS — E78.5 DYSLIPIDEMIA: ICD-10-CM

## 2024-11-26 PROCEDURE — 36415 COLL VENOUS BLD VENIPUNCTURE: CPT

## 2024-11-26 PROCEDURE — 83695 ASSAY OF LIPOPROTEIN(A): CPT

## 2024-11-26 NOTE — PRE-PROCEDURE INSTRUCTIONS
"Instructed on date and arrival time of 0700. Instructed that arrival time is not their procedure time but allows time to prepare for procedure.  Come to entrance \"C\". Must have  over age 18 to drive home.  May have two visitors; however, children under 12 must stay in waiting room.  Discussed clear liquid diet (no red or purple), bowel prep, and NPO.  May take medications as usual except for blood thinners, diabetic medications, and weight loss medications.  Verbalized understanding of instructions given.  Instructed to call for questions or concerns.  "

## 2024-11-26 NOTE — PRE-PROCEDURE INSTRUCTIONS
"PAT call attempted.  No answer.  Detailed message with date and arrival time of 0700 given.  Instructed that arrival time is not procedure time but allows time to prepare for procedure. Come to entrance \"C\"; must have adult  for transportation home; may have two visitors; however, children under 12 must remain in waiting area; instructed on diet/clear liquids/NPO/bowel prep, if needed; may take normal meds two hours prior to arrival time except for blood thinners, antidiabetics, diuretics, and weight loss meds.  Instructed to return call to confirm receipt of instructions and for any questions.  Hold Wegovy for one week prior to procedure.  Cardiac clearance noted in chart.  "

## 2024-11-27 LAB — LPA SERPL-SCNC: 30.8 NMOL/L

## 2024-12-04 ENCOUNTER — ANESTHESIA EVENT (OUTPATIENT)
Dept: GASTROENTEROLOGY | Facility: HOSPITAL | Age: 44
End: 2024-12-04
Payer: COMMERCIAL

## 2024-12-04 NOTE — ANESTHESIA PREPROCEDURE EVALUATION
Anesthesia Evaluation     Patient summary reviewed and Nursing notes reviewed   history of anesthetic complications (emotional upon wakening with his PTSD):   NPO Solid Status: > 8 hours  NPO Liquid Status: > 4 hours           Airway   Mallampati: I  TM distance: >3 FB  Neck ROM: full  No difficulty expected  Dental - normal exam         Pulmonary - negative pulmonary ROS and normal exam    breath sounds clear to auscultation  Cardiovascular - normal exam  Exercise tolerance: good (4-7 METS)    ECG reviewed  Patient on routine beta blocker  Rhythm: regular  Rate: normal    (+) hypertension well controlled, dysrhythmias (has a loop recorder - has prolonged Qt syndrome, hx Brugada syndrome), hyperlipidemia    ROS comment: Family hx of sudden cardiac death. Pt hx of BBB per ECG 4/9/24    Neuro/Psych  (+) headaches, syncope, numbness, psychiatric history Anxiety and PTSD  GI/Hepatic/Renal/Endo    (+) GERD well controlled, liver disease history of elevated LFT    ROS Comment: Hx polyps     Musculoskeletal     (+) neck pain  Abdominal    Substance History      OB/GYN          Other   arthritis,     ROS/Med Hx Other: Pt states he can wake up disoriented/emotional post-procedure.     Last dose Wegovy- 11/20     EKG 04/09/24: , sinus tach, RBBB    EP study 02/03/22:   FINAL IMPRESSIONS:    1.   No inducible nonsustained or sustained ventricular arrhythmias despite aggressive pacing protocol both on and off isoproterenol.  2.   Normal QT interval in the baseline state with appropriate QT interval shortening during high right atrial pacing and on isoproterenol.  3.   No significant ST elevation noted following IV procainamide infusion.     Cards clearance from Dr. Sawant with moderate risks on 10/07/24         Phys Exam Other: Full beard               Anesthesia Plan    ASA 3     general   total IV anesthesia  (Total IV Anesthesia    Patient understands anesthesia not responsible for dental damage.  )  intravenous  induction     Anesthetic plan, risks, benefits, and alternatives have been provided, discussed and informed consent has been obtained with: patient and spouse/significant other.  Pre-procedure education provided  Plan discussed with CRNA.      CODE STATUS:

## 2024-12-05 ENCOUNTER — ANESTHESIA (OUTPATIENT)
Dept: GASTROENTEROLOGY | Facility: HOSPITAL | Age: 44
End: 2024-12-05
Payer: COMMERCIAL

## 2024-12-05 ENCOUNTER — HOSPITAL ENCOUNTER (OUTPATIENT)
Facility: HOSPITAL | Age: 44
Setting detail: HOSPITAL OUTPATIENT SURGERY
Discharge: HOME | End: 2024-12-05
Attending: INTERNAL MEDICINE | Admitting: INTERNAL MEDICINE
Payer: COMMERCIAL

## 2024-12-05 VITALS
TEMPERATURE: 97.9 F | HEIGHT: 72 IN | BODY MASS INDEX: 24.31 KG/M2 | SYSTOLIC BLOOD PRESSURE: 107 MMHG | DIASTOLIC BLOOD PRESSURE: 87 MMHG | RESPIRATION RATE: 16 BRPM | WEIGHT: 179.45 LBS | HEART RATE: 69 BPM | OXYGEN SATURATION: 100 %

## 2024-12-05 DIAGNOSIS — Z86.0100 HISTORY OF COLON POLYPS: ICD-10-CM

## 2024-12-05 DIAGNOSIS — R12 HEARTBURN: ICD-10-CM

## 2024-12-05 PROCEDURE — 45378 DIAGNOSTIC COLONOSCOPY: CPT | Performed by: INTERNAL MEDICINE

## 2024-12-05 PROCEDURE — 25010000002 LIDOCAINE PF 2% 2 % SOLUTION: Performed by: NURSE ANESTHETIST, CERTIFIED REGISTERED

## 2024-12-05 PROCEDURE — 88305 TISSUE EXAM BY PATHOLOGIST: CPT | Performed by: INTERNAL MEDICINE

## 2024-12-05 PROCEDURE — 25010000002 PROPOFOL 10 MG/ML EMULSION: Performed by: NURSE ANESTHETIST, CERTIFIED REGISTERED

## 2024-12-05 PROCEDURE — 43239 EGD BIOPSY SINGLE/MULTIPLE: CPT | Performed by: INTERNAL MEDICINE

## 2024-12-05 RX ORDER — SODIUM CHLORIDE 0.9 % (FLUSH) 0.9 %
10 SYRINGE (ML) INJECTION AS NEEDED
Status: DISCONTINUED | OUTPATIENT
Start: 2024-12-05 | End: 2024-12-05 | Stop reason: HOSPADM

## 2024-12-05 RX ORDER — LIDOCAINE HYDROCHLORIDE 10 MG/ML
0.5 INJECTION, SOLUTION INFILTRATION; PERINEURAL ONCE AS NEEDED
Status: DISCONTINUED | OUTPATIENT
Start: 2024-12-05 | End: 2024-12-05 | Stop reason: HOSPADM

## 2024-12-05 RX ORDER — SODIUM CHLORIDE, SODIUM LACTATE, POTASSIUM CHLORIDE, CALCIUM CHLORIDE 600; 310; 30; 20 MG/100ML; MG/100ML; MG/100ML; MG/100ML
30 INJECTION, SOLUTION INTRAVENOUS CONTINUOUS
Status: DISCONTINUED | OUTPATIENT
Start: 2024-12-05 | End: 2024-12-05 | Stop reason: HOSPADM

## 2024-12-05 RX ORDER — PROPOFOL 10 MG/ML
VIAL (ML) INTRAVENOUS AS NEEDED
Status: DISCONTINUED | OUTPATIENT
Start: 2024-12-05 | End: 2024-12-05 | Stop reason: SURG

## 2024-12-05 RX ORDER — LIDOCAINE HYDROCHLORIDE 20 MG/ML
INJECTION, SOLUTION EPIDURAL; INFILTRATION; INTRACAUDAL; PERINEURAL AS NEEDED
Status: DISCONTINUED | OUTPATIENT
Start: 2024-12-05 | End: 2024-12-05 | Stop reason: SURG

## 2024-12-05 RX ADMIN — PROPOFOL 200 MCG/KG/MIN: 10 INJECTION, EMULSION INTRAVENOUS at 09:01

## 2024-12-05 RX ADMIN — Medication 10 ML: at 09:02

## 2024-12-05 RX ADMIN — PROPOFOL 150 MG: 10 INJECTION, EMULSION INTRAVENOUS at 09:01

## 2024-12-05 RX ADMIN — LIDOCAINE HYDROCHLORIDE 30 MG: 20 INJECTION, SOLUTION EPIDURAL; INFILTRATION; INTRACAUDAL; PERINEURAL at 09:01

## 2024-12-05 NOTE — ANESTHESIA POSTPROCEDURE EVALUATION
Patient: Timi Hernadnez    Procedure Summary       Date: 12/05/24 Room / Location: [facility] ENDOSCOPY 4 / [facility] END[facility]COPY    Anesthesia Start: 0857 Anesthesia Stop: 0934    Procedures:       ESOPHAGOGASTRODUODENOSCOPY WITH BIOPSIES      COLONOSCOPY Diagnosis:       History of colon polyps      Heartburn      (History of colon polyps [Z86.010])      (Heartburn [R12])    Surgeons: Brennan Mendez MD Provider: Andrew Fuentes CRNA    Anesthesia Type: general ASA Status: 3            Anesthesia Type: general    Vitals  Vitals Value Taken Time   /87 12/05/24 0948   Temp 36.6 °C (97.9 °F) 12/05/24 0948   Pulse 69 12/05/24 0951   Resp 16 12/05/24 0948   SpO2 99 % 12/05/24 0951   Vitals shown include unfiled device data.        Post Anesthesia Care and Evaluation    Post-procedure mental status: acceptable.  Pain management: satisfactory to patient    Airway patency: patent  Anesthetic complications: No anesthetic complications    Cardiovascular status: acceptable  Respiratory status: acceptable    Comments: Per chart review

## 2024-12-05 NOTE — H&P
Pre Procedure History & Physical    Chief Complaint:   Persistent heartburn past history colon polyp    Subjective     HPI:   Persistent heartburn past history colon polyp    Past Medical History:   Past Medical History:   Diagnosis Date    ADHD (attention deficit hyperactivity disorder) 1992    Anemia     Anxiety     Arthritis     Brain concussion     Brugada syndrome 11/16/2021    Chronic allergic rhinitis     CTS (carpal tunnel syndrome)     Depression 2004    Diverticulosis 2019    Essential hypertension     Family history of colon cancer in mother     Fatty liver     GERD (gastroesophageal reflux disease)     Gout     Headache     Hepatomegaly     HL (hearing loss) 2004    Hyperlipidemia     IBS (irritable bowel syndrome)     Ingrown toenail     Kidney stones     Low back pain 2022    Lumbosacral disc disease     Lyme disease 2021    Plantar fasciitis     Right bundle branch block (RBBB), 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)     Syncope        Past Surgical History:  Past Surgical History:   Procedure Laterality Date    ADENOIDECTOMY  1993    CARDIAC CATHETERIZATION  2022    CARDIAC ELECTROPHYSIOLOGY PROCEDURE N/A 02/03/2022    Procedure: EPS + Procainimide Challenge +/- SICD (BSC), no meds to hold;  Surgeon: Lang Sorensen MD;  Location: [facility] INVASIVE LOCATION;  Service: Cardiology;  Laterality: N/A;    CHOLECYSTECTOMY  2017    Dr. Ray    COLONOSCOPY  2019    EPIDURAL BLOCK  May 2023    EYE SURGERY  2008    GALLBLADDER SURGERY      HERNIA REPAIR      INGUINAL HERNIA REPAIR  1984    LASIK      TOE SURGERY      TONSILLECTOMY      TRIGGER POINT INJECTION  June 2023    VASCULAR SURGERY  Feb 2022    VASECTOMY         Family History:  Family History   Problem Relation Age of Onset    Heart failure Mother         Heart attack in her early 50s    Hyperlipidemia Mother     Heart attack Mother     Hypertension Mother     Colon cancer Mother 53    Stroke Mother     Alcohol  "abuse Mother     Anxiety disorder Mother     Cancer Mother         colon    Developmental Disability Mother     Arrhythmia Mother     Heart disease Father     Alcohol abuse Father     Heart disease Sister     Lung cancer Maternal Uncle         50s    Heart disease Maternal Uncle         Cardiomyopathy    Alcohol abuse Maternal Uncle     Stroke Maternal Grandfather     Heart disease Maternal Grandfather         Cardiac arrest Liu of the arteries    Heart attack Other     Heart disease Other     Heart failure Other     Hypertension Other     Hyperlipidemia Other        Social History:   reports that he has never smoked. He has never used smokeless tobacco. He reports that he does not currently use alcohol. He reports that he does not use drugs.    Medications:   Facility-Administered Medications Prior to Admission   Medication Dose Route Frequency Provider Last Rate Last Admin    bupivacaine (MARCAINE) 0.25 % injection 10 mL  10 mL Injection Once Halie Rodríguez APRN         Medications Prior to Admission   Medication Sig Dispense Refill Last Dose/Taking    aspirin 81 MG EC tablet Take 1 tablet by mouth Daily.   12/3/2024    cetirizine (zyrTEC) 10 MG tablet Zyrtec 10 mg oral tablet take 1 tablet (10 mg) by oral route once daily   Active       clonazePAM (KlonoPIN) 1 MG tablet Take 1 tablet by mouth As Needed.       pantoprazole (PROTONIX) 40 MG EC tablet        propranolol LA (Inderal LA) 60 MG 24 hr capsule Take 1 capsule by mouth Daily. 90 capsule 3     Vyvanse 30 MG capsule Take 1 capsule by mouth Daily       Wegovy 1 MG/0.5ML solution auto-injector    11/20/2024       Allergies:  Penicillins, Sulfamethoxazole-trimethoprim, and Colchicine        Objective     Blood pressure 129/91, pulse 72, temperature 97.3 °F (36.3 °C), temperature source Temporal, resp. rate 20, height 182.9 cm (72\"), weight 81.4 kg (179 lb 7.3 oz), SpO2 97%.    Physical Exam   Constitutional: Pt is oriented to person, place, and time and " well-developed, well-nourished, and in no distress.   Mouth/Throat: Oropharynx is clear and moist.   Neck: Normal range of motion.   Cardiovascular: Normal rate, regular rhythm and normal heart sounds.    Pulmonary/Chest: Effort normal and breath sounds normal.   Abdominal: Soft. Nontender  Skin: Skin is warm and dry.   Psychiatric: Mood, memory, affect and judgment normal.     Assessment & Plan     Diagnosis:  Persistent heartburn past history colon polyps    Anticipated Surgical Procedure:  EGD and colonoscopy    The risks, benefits, and alternatives of this procedure have been discussed with the patient or the responsible party- the patient understands and agrees to proceed.

## 2024-12-06 LAB
CYTO UR: NORMAL
LAB AP CASE REPORT: NORMAL
LAB AP CLINICAL INFORMATION: NORMAL
PATH REPORT.FINAL DX SPEC: NORMAL
PATH REPORT.GROSS SPEC: NORMAL

## 2024-12-17 NOTE — TELEPHONE ENCOUNTER
----- Message from Eloise Antonio sent at 10/7/2021  1:22 PM EDT -----  Regarding: FW: Test Results Question  Contact: [phone]    ----- Message -----  From: Timi Hernandez  Sent: 10/4/2021   1:32 PM EDT  To: [facility] Clinical Pool  Subject: Test Results Question                            Dr. Rutherford,    Since I started the Fenofibrate. My Triglycerides are not going down. Now my AST and ALT are up. I have a feeling it is either the lymes or the Fenofibrate to raise my liver enzymes. Alla Kaiser has the blood work but has said nothing to me. I feel like I should drop the Fenofibrate and see if they helps the liver enzymes and obviously the Vasapea is not working either. I know I need to do my part and lose weight and exercise, but I am in pain so it's not easy to exercise. I do walk a few times a week and I try to diet but I do depressed to eat rabbit food all the time. I feel like I am on train esequiel to crash.     I do have a therapist. Radha Hurtado.    I know you wanted to put me on a pain medicine, like a lyrica or cymbolta. But Dr. Sorensen and Dr. Pettit are recommend I don't add anymore meds right now. I am getting heart MRI on the 14th of oct.     I decide not to go see dr. Jj because I don't have the headaches anymore but my neck still hurts.     I do have liver ultrasound coming up as well and next month I finally see the Hematologist.       Dontrell Hernandez  [phone]    
Spoke with patient he said yes to adding the statin medication. He said that he thinks that the Fenofibrate is not working. Can he stop the Fenofibrate? Please send to [facility] on [address].   
Tell pt that maybe we can add a low dose statin once a week. I know that it is a statin and metabolized by the liver but once a week may help bringing his numbers down if the fibrates arent doing it. I would do a low dose statin, just once a week. He will stay on the vascepa. Ask him would he be ok with somethinig like that. Tell pt I am out of the office this week, but let me know what he says and I will send something in on Monday. Thanks.  
units; 68-82 min = 5 units   Total Total     [x]  Patient Education billed concurrently with other procedures   [x] Review HEP    [] Progressed/Changed HEP, detail:    [] Other detail:       Objective Information/Functional Measures/Assessment    Initiated RDL this visit. Pt required frequent cueing for maintaining neutral spine angle. Modified exercise to perform hip hinge with dowel. Noted improved form and technique with dowel. Progressed with functional core and BLE strengthening. Noted fatigue but no increase in pain post treatment session. Updated and reviewed HEP with pt. Pt confirmed understanding by performing demo.    Patient will continue to benefit from skilled PT / OT services to modify and progress therapeutic interventions, analyze and address functional mobility deficits, analyze and address ROM deficits, and analyze and address strength deficits to address functional deficits and attain remaining goals.    Progress toward goals / Updated goals:  []  See Progress Note/Recertification    New Goals to be achieved in __4__ WEEKS  1. Pt will improve lumbar flexion fingertips to mid shins for decreased impairment with bending activities.  Status at last note/certification:  prox shins  Current:   12/18/24: Progressing, added hip hinge with dowel  2. Pt will improve b/l 90-90 Hams to </= 30 deg for decreased lumbar strain with bending.  Status at last note/certification: R 56 \"tight\", L 55 \"tight\"  Current:   12/16/24: Progressing towards goal  3. Pt will report max pain </= 2/10 with prolonged sitting to decrease impairment with ADL's  Status at last note/certification: 7/10 max pain  Current: 12/12/24: Progressing, 0/10 pain at end of treatment session;  4. Patient to be independent & compliant with progressive HEP in preparation for D/C.  Status at last note/certification: reviewed HEP  Current: 12/10/24: Progressing, updated HEP    Next PN/ RC due 1/3/24  Auth due (visit number/ date) TERRI;

## 2025-02-04 ENCOUNTER — HOSPITAL ENCOUNTER (OUTPATIENT)
Dept: CT IMAGING | Facility: HOSPITAL | Age: 45
Discharge: HOME | End: 2025-02-04
Admitting: INTERNAL MEDICINE

## 2025-02-04 DIAGNOSIS — E78.5 DYSLIPIDEMIA: ICD-10-CM

## 2025-02-04 PROCEDURE — 75571 CT HRT W/O DYE W/CA TEST: CPT

## 2025-02-05 ENCOUNTER — TELEPHONE (OUTPATIENT)
Dept: CARDIOLOGY | Facility: CLINIC | Age: 45
End: 2025-02-05
Payer: COMMERCIAL

## 2025-02-05 NOTE — TELEPHONE ENCOUNTER
----- Message from Kelsey Lauren sent at 2/4/2025  9:37 PM EST -----  CT Calcium score is 23 indicating mild plaque, continue aspirin 81 mg daily, adequate blood pressure control, and continued lipid lowering. Follow up as scheduled

## 2025-02-12 PROCEDURE — 93298 REM INTERROG DEV EVAL SCRMS: CPT | Performed by: INTERNAL MEDICINE

## 2025-02-14 ENCOUNTER — TELEPHONE (OUTPATIENT)
Dept: GASTROENTEROLOGY | Facility: CLINIC | Age: 45
End: 2025-02-14
Payer: COMMERCIAL

## 2025-02-14 NOTE — TELEPHONE ENCOUNTER
Attempted to contact patient to see if they wanted to reschedule at this time. [facility]

## 2025-03-04 ENCOUNTER — OFFICE VISIT (OUTPATIENT)
Dept: FAMILY MEDICINE CLINIC | Facility: CLINIC | Age: 45
End: 2025-03-04
Payer: COMMERCIAL

## 2025-03-04 VITALS
OXYGEN SATURATION: 98 % | HEART RATE: 76 BPM | HEIGHT: 72 IN | TEMPERATURE: 97.4 F | BODY MASS INDEX: 25.25 KG/M2 | WEIGHT: 186.4 LBS | DIASTOLIC BLOOD PRESSURE: 88 MMHG | SYSTOLIC BLOOD PRESSURE: 132 MMHG

## 2025-03-04 DIAGNOSIS — F41.9 ANXIETY: Primary | ICD-10-CM

## 2025-03-04 DIAGNOSIS — R53.83 FATIGUE, UNSPECIFIED TYPE: ICD-10-CM

## 2025-03-04 DIAGNOSIS — Z01.89 ROUTINE LAB DRAW: ICD-10-CM

## 2025-03-04 DIAGNOSIS — E78.2 MIXED HYPERLIPIDEMIA: ICD-10-CM

## 2025-03-04 DIAGNOSIS — F33.1 MODERATE EPISODE OF RECURRENT MAJOR DEPRESSIVE DISORDER: ICD-10-CM

## 2025-03-04 DIAGNOSIS — M25.50 ARTHRALGIA, UNSPECIFIED JOINT: ICD-10-CM

## 2025-03-04 DIAGNOSIS — E55.9 VITAMIN D DEFICIENCY: ICD-10-CM

## 2025-03-04 DIAGNOSIS — E66.3 OVERWEIGHT: ICD-10-CM

## 2025-03-04 NOTE — PROGRESS NOTES
Timi Hernandez presents to [facility] who presents to the clinic for follow-up.      History of Present Illness  This is a 44-year-old male who presents to the clinic for follow-up.    Mental health: Patient states that he has been under more stress recently as he was removed from his job.  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  Patient states that he is currently working on an appeal process through this through the [employer] and is hopeful to get his job back, but this has put extra stress on him.  Would like to go ahead before his insurance goes out to repeat all of his blood work if possible.  Patient states that he is doing okay, continues to follow-up with his specialist and does remain on clonazepam and Vyvanse.  States that he is not having any suicidal thoughts or ideation currently.    Obesity: Patient continues to do really well on Wegovy states that he continues with his diet changes, focus on high-protein, and states that he is really doing well.  States that he is starting the maintenance process and will then slowly start to wean this is all done with [facility].  No issues or side effects.  States that he feels a lot better with the weight loss.    Patient states that he continues to have a severe arthralgia, states that he has been tested for auto immune disease in the past but nothing has ever tested positive.  States that he would like to be rechecked again if able.    Patient has no other new acute complaints today.    Refuses vaccine/immunizations otherwise up-to-date on other preventative screenings  The following portions of the patient's history were personally reviewed and updated as appropriate: allergies, current medications, past medical history, past surgical history, past family history, and past social history.  "      Objective   Vital Signs:   /88 (BP Location: Left arm, Patient Position: Sitting, Cuff Size: Adult)   Pulse 76   Temp 97.4 °F (36.3 °C)   Ht 182.9 cm (72.01\")   Wt 84.6 kg (186 lb 6.4 oz)   SpO2 98%   BMI 25.27 kg/m²     Body mass index is 25.27 kg/m².    All labs, imaging, test results, and specialty provider notes reviewed with patient.     Physical Exam  Vitals reviewed.   Constitutional:       Appearance: Normal appearance.   Cardiovascular:      Rate and Rhythm: Normal rate and regular rhythm.      Pulses: Normal pulses.      Heart sounds: Normal heart sounds.   Pulmonary:      Effort: Pulmonary effort is normal.      Breath sounds: Normal breath sounds.   Neurological:      General: No focal deficit present.      Mental Status: He is alert and oriented to person, place, and time.                               Assessment and Plan:  Diagnoses and all orders for this visit:    1. Anxiety (Primary)  Comments:  Patient to continue with coping skills, continue with follow-up with psychiatry and management per them.  ER for any suicidal thoughts or ideation    2. Moderate episode of recurrent major depressive disorder    3. Overweight  Comments:  Doing well, continue on wegovy at current dose via the [facility]. Continue lifestyle changes.    4. Routine lab draw  -     CBC Auto Differential; Future  -     Comprehensive Metabolic Panel; Future  -     TSH Rfx On Abnormal To Free T4; Future  -     Urinalysis With Culture If Indicated -; Future    5. Mixed hyperlipidemia  -     Lipid Panel; Future    6. Fatigue, unspecified type  -     Vitamin B12 & Folate; Future    7. Vitamin D deficiency  -     Vitamin D,25-Hydroxy; Future    8. Arthralgia, unspecified joint  Comments:  Recheck for autoimmune disease  Orders:  -     SANDRO Direct Reflex to 11 Biomarker; Future  -     Cyclic citrul peptide antibody, IgG/IgA; Future  -     CK; Future  -     C-reactive protein; Future  -     Sedimentation rate; Future  -     Uric " acid; Future  -     Antistreptolysin O screen; Future  -     Rheumatoid Factor; Future          Follow Up:  Return in about 6 months (around 9/4/2025).    Patient was given instructions and counseling regarding his condition or for health maintenance advice. Please see specific information pulled into the AVS if appropriate.

## 2025-03-05 ENCOUNTER — LAB (OUTPATIENT)
Dept: LAB | Facility: HOSPITAL | Age: 45
End: 2025-03-05
Payer: COMMERCIAL

## 2025-03-05 DIAGNOSIS — M25.50 ARTHRALGIA, UNSPECIFIED JOINT: ICD-10-CM

## 2025-03-05 DIAGNOSIS — Z01.89 ROUTINE LAB DRAW: ICD-10-CM

## 2025-03-05 DIAGNOSIS — E55.9 VITAMIN D DEFICIENCY: ICD-10-CM

## 2025-03-05 DIAGNOSIS — R53.83 FATIGUE, UNSPECIFIED TYPE: ICD-10-CM

## 2025-03-05 DIAGNOSIS — E78.2 MIXED HYPERLIPIDEMIA: ICD-10-CM

## 2025-03-05 LAB
25(OH)D3 SERPL-MCNC: 27.9 NG/ML (ref 30–100)
ALBUMIN SERPL-MCNC: 4.2 G/DL (ref 3.5–5.2)
ALBUMIN/GLOB SERPL: 1.4 G/DL
ALP SERPL-CCNC: 48 U/L (ref 39–117)
ALT SERPL W P-5'-P-CCNC: 13 U/L (ref 1–41)
ANION GAP SERPL CALCULATED.3IONS-SCNC: 7.6 MMOL/L (ref 5–15)
ASO AB SERPL-ACNC: NEGATIVE [IU]/ML
AST SERPL-CCNC: 18 U/L (ref 1–40)
BASOPHILS # BLD AUTO: 0.02 10*3/MM3 (ref 0–0.2)
BASOPHILS NFR BLD AUTO: 0.3 % (ref 0–1.5)
BILIRUB SERPL-MCNC: 0.8 MG/DL (ref 0–1.2)
BILIRUB UR QL STRIP: NEGATIVE
BUN SERPL-MCNC: 15 MG/DL (ref 6–20)
BUN/CREAT SERPL: 14 (ref 7–25)
CALCIUM SPEC-SCNC: 9.5 MG/DL (ref 8.6–10.5)
CHLORIDE SERPL-SCNC: 100 MMOL/L (ref 98–107)
CHOLEST SERPL-MCNC: 201 MG/DL (ref 0–200)
CHROMATIN AB SERPL-ACNC: <10 IU/ML (ref 0–14)
CK SERPL-CCNC: 157 U/L (ref 20–200)
CLARITY UR: CLEAR
CO2 SERPL-SCNC: 28.4 MMOL/L (ref 22–29)
COLOR UR: YELLOW
CREAT SERPL-MCNC: 1.07 MG/DL (ref 0.76–1.27)
CRP SERPL-MCNC: <0.3 MG/DL (ref 0–0.5)
DEPRECATED RDW RBC AUTO: 40.7 FL (ref 37–54)
EGFRCR SERPLBLD CKD-EPI 2021: 87.8 ML/MIN/1.73
EOSINOPHIL # BLD AUTO: 0.06 10*3/MM3 (ref 0–0.4)
EOSINOPHIL NFR BLD AUTO: 1 % (ref 0.3–6.2)
ERYTHROCYTE [DISTWIDTH] IN BLOOD BY AUTOMATED COUNT: 13.2 % (ref 12.3–15.4)
ERYTHROCYTE [SEDIMENTATION RATE] IN BLOOD: <1 MM/HR (ref 0–15)
FOLATE SERPL-MCNC: 8.25 NG/ML (ref 4.78–24.2)
GLOBULIN UR ELPH-MCNC: 2.9 GM/DL
GLUCOSE SERPL-MCNC: 89 MG/DL (ref 65–99)
GLUCOSE UR STRIP-MCNC: NEGATIVE MG/DL
HCT VFR BLD AUTO: 43 % (ref 37.5–51)
HDLC SERPL-MCNC: 31 MG/DL (ref 40–60)
HGB BLD-MCNC: 14.7 G/DL (ref 13–17.7)
HGB UR QL STRIP.AUTO: NEGATIVE
HOLD SPECIMEN: NORMAL
IMM GRANULOCYTES # BLD AUTO: 0.02 10*3/MM3 (ref 0–0.05)
IMM GRANULOCYTES NFR BLD AUTO: 0.3 % (ref 0–0.5)
KETONES UR QL STRIP: NEGATIVE
LDLC SERPL CALC-MCNC: 123 MG/DL (ref 0–100)
LDLC/HDLC SERPL: 3.77 {RATIO}
LEUKOCYTE ESTERASE UR QL STRIP.AUTO: NEGATIVE
LYMPHOCYTES # BLD AUTO: 2.02 10*3/MM3 (ref 0.7–3.1)
LYMPHOCYTES NFR BLD AUTO: 34.6 % (ref 19.6–45.3)
MCH RBC QN AUTO: 29.4 PG (ref 26.6–33)
MCHC RBC AUTO-ENTMCNC: 34.2 G/DL (ref 31.5–35.7)
MCV RBC AUTO: 86 FL (ref 79–97)
MONOCYTES # BLD AUTO: 0.3 10*3/MM3 (ref 0.1–0.9)
MONOCYTES NFR BLD AUTO: 5.1 % (ref 5–12)
NEUTROPHILS NFR BLD AUTO: 3.41 10*3/MM3 (ref 1.7–7)
NEUTROPHILS NFR BLD AUTO: 58.7 % (ref 42.7–76)
NITRITE UR QL STRIP: NEGATIVE
NRBC BLD AUTO-RTO: 0 /100 WBC (ref 0–0.2)
PH UR STRIP.AUTO: 6 [PH] (ref 5–8)
PLATELET # BLD AUTO: 192 10*3/MM3 (ref 140–450)
PMV BLD AUTO: 10.5 FL (ref 6–12)
POTASSIUM SERPL-SCNC: 4 MMOL/L (ref 3.5–5.2)
PROT SERPL-MCNC: 7.1 G/DL (ref 6–8.5)
PROT UR QL STRIP: NEGATIVE
RBC # BLD AUTO: 5 10*6/MM3 (ref 4.14–5.8)
SODIUM SERPL-SCNC: 136 MMOL/L (ref 136–145)
SP GR UR STRIP: 1.01 (ref 1–1.03)
TRIGL SERPL-MCNC: 266 MG/DL (ref 0–150)
TSH SERPL DL<=0.05 MIU/L-ACNC: 1.29 UIU/ML (ref 0.27–4.2)
URATE SERPL-MCNC: 5.7 MG/DL (ref 3.4–7)
UROBILINOGEN UR QL STRIP: NORMAL
VIT B12 BLD-MCNC: 508 PG/ML (ref 211–946)
VLDLC SERPL-MCNC: 47 MG/DL (ref 5–40)
WBC NRBC COR # BLD AUTO: 5.83 10*3/MM3 (ref 3.4–10.8)

## 2025-03-05 PROCEDURE — 86140 C-REACTIVE PROTEIN: CPT

## 2025-03-05 PROCEDURE — 81003 URINALYSIS AUTO W/O SCOPE: CPT

## 2025-03-05 PROCEDURE — 85652 RBC SED RATE AUTOMATED: CPT

## 2025-03-05 PROCEDURE — 82550 ASSAY OF CK (CPK): CPT

## 2025-03-05 PROCEDURE — 86063 ANTISTREPTOLYSIN O SCREEN: CPT

## 2025-03-05 PROCEDURE — 86038 ANTINUCLEAR ANTIBODIES: CPT

## 2025-03-05 PROCEDURE — 80061 LIPID PANEL: CPT

## 2025-03-05 PROCEDURE — 84550 ASSAY OF BLOOD/URIC ACID: CPT

## 2025-03-05 PROCEDURE — 86200 CCP ANTIBODY: CPT

## 2025-03-05 PROCEDURE — 82306 VITAMIN D 25 HYDROXY: CPT

## 2025-03-05 PROCEDURE — 36415 COLL VENOUS BLD VENIPUNCTURE: CPT

## 2025-03-05 PROCEDURE — 86431 RHEUMATOID FACTOR QUANT: CPT

## 2025-03-05 PROCEDURE — 82746 ASSAY OF FOLIC ACID SERUM: CPT

## 2025-03-05 PROCEDURE — 80050 GENERAL HEALTH PANEL: CPT

## 2025-03-05 PROCEDURE — 82607 VITAMIN B-12: CPT

## 2025-03-06 LAB — ANA SER QL: NEGATIVE

## 2025-03-07 LAB — CCP IGA+IGG SERPL IA-ACNC: 8 UNITS (ref 0–19)

## 2025-03-10 ENCOUNTER — RESULTS FOLLOW-UP (OUTPATIENT)
Dept: LAB | Facility: HOSPITAL | Age: 45
End: 2025-03-10
Payer: COMMERCIAL

## 2025-04-16 ENCOUNTER — OFFICE VISIT (OUTPATIENT)
Dept: FAMILY MEDICINE CLINIC | Facility: CLINIC | Age: 45
End: 2025-04-16
Payer: COMMERCIAL

## 2025-04-16 ENCOUNTER — HOSPITAL ENCOUNTER (OUTPATIENT)
Dept: GENERAL RADIOLOGY | Facility: HOSPITAL | Age: 45
Discharge: HOME | End: 2025-04-16
Payer: COMMERCIAL

## 2025-04-16 VITALS
RESPIRATION RATE: 18 BRPM | HEART RATE: 70 BPM | HEIGHT: 72 IN | DIASTOLIC BLOOD PRESSURE: 70 MMHG | TEMPERATURE: 96.1 F | BODY MASS INDEX: 26.03 KG/M2 | SYSTOLIC BLOOD PRESSURE: 102 MMHG | WEIGHT: 192.2 LBS | OXYGEN SATURATION: 97 %

## 2025-04-16 DIAGNOSIS — M51.362 DEGENERATION OF INTERVERTEBRAL DISC OF LUMBAR REGION WITH DISCOGENIC BACK PAIN AND LOWER EXTREMITY PAIN: Primary | ICD-10-CM

## 2025-04-16 DIAGNOSIS — M79.605 PAIN OF LEFT LOWER EXTREMITY: ICD-10-CM

## 2025-04-16 DIAGNOSIS — M25.552 PAIN OF LEFT HIP: ICD-10-CM

## 2025-04-16 PROCEDURE — 99213 OFFICE O/P EST LOW 20 MIN: CPT | Performed by: NURSE PRACTITIONER

## 2025-04-16 PROCEDURE — 73552 X-RAY EXAM OF FEMUR 2/>: CPT

## 2025-04-16 PROCEDURE — 73502 X-RAY EXAM HIP UNI 2-3 VIEWS: CPT

## 2025-04-16 RX ORDER — METHYLPREDNISOLONE ACETATE 80 MG/ML
80 INJECTION, SUSPENSION INTRA-ARTICULAR; INTRALESIONAL; INTRAMUSCULAR; SOFT TISSUE ONCE
Status: COMPLETED | OUTPATIENT
Start: 2025-04-16 | End: 2025-04-16

## 2025-04-16 RX ADMIN — METHYLPREDNISOLONE ACETATE 80 MG: 80 INJECTION, SUSPENSION INTRA-ARTICULAR; INTRALESIONAL; INTRAMUSCULAR; SOFT TISSUE at 08:27
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
## 2025-04-28 NOTE — TELEPHONE ENCOUNTER
----- Message from Timi Hernandez sent at 7/6/2022 12:29 PM EDT -----  Regarding: Work Note   Also.    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   
On desk for signature.   
Timi Hernandez Gery F, MD 10 minutes ago (12:22 PM)         Is is possible to get a note for work why is a better environment for my prolong qt to continue working from home. Giving me a stress free, uninterrupted environment where I can get my projects done. When I am at work. I am constantly on edge and am less productive. Hearts up, blood pressure is up and my Symtoms of headaches, lightheaded, dizziness, fast chaotic heartbeats become heightened.      I am asking because they are end our telework and bring us back in the office, so now I need a doctors not so I can keep teleworking.      Thanks Dontrell Hernandez  [phone]    
Work release was faxed with confirmation at 12:34 pm. I made patient aware through My Chart.  
28-Apr-2025 09:47

## 2025-05-13 ENCOUNTER — OFFICE VISIT (OUTPATIENT)
Dept: CARDIOLOGY | Facility: CLINIC | Age: 45
End: 2025-05-13
Payer: COMMERCIAL

## 2025-05-13 VITALS
HEART RATE: 65 BPM | DIASTOLIC BLOOD PRESSURE: 86 MMHG | BODY MASS INDEX: 25.06 KG/M2 | HEIGHT: 72 IN | WEIGHT: 185 LBS | SYSTOLIC BLOOD PRESSURE: 138 MMHG | OXYGEN SATURATION: 97 %

## 2025-05-13 DIAGNOSIS — I49.8 BRUGADA SYNDROME: Chronic | ICD-10-CM

## 2025-05-13 DIAGNOSIS — R03.0 PREHYPERTENSION: ICD-10-CM

## 2025-05-13 DIAGNOSIS — R55 SYNCOPE AND COLLAPSE: Primary | ICD-10-CM

## 2025-05-13 DIAGNOSIS — I45.81 LONG Q-T SYNDROME: Chronic | ICD-10-CM

## 2025-05-13 DIAGNOSIS — R00.0 SINUS TACHYCARDIA: Chronic | ICD-10-CM

## 2025-05-13 RX ORDER — THIAMINE HCL 50 MG
25 TABLET ORAL DAILY
COMMUNITY

## 2025-05-13 NOTE — PROGRESS NOTES
Timi Hernandez  [id number]  1980  [phone]    [facility]     Referring Provider: No ref. provider found     Clemencia Salas, APRN  [address] [id number]    Chief Complaint   Patient presents with    Syncope and collapse       Problem List   Mixed genotype Long QT 3 syndrome/Brugada Syndrome  Uncle with reported long QT syndrome. Grandfather with sudden cardiac death at age 36   Echo 3/29/2021 ejection fraction 55%, no significant valvular heart issues.  Stress Test 3/29/2021 good exercise tolerance, and exercise EKG negative for ischemia, atypical chest pain and abdominal pain with exercise, no arrhythmias seen.  Study is consistent with no reversible ischemia at rest or with exercise.  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 No VT. Less than 1% PACs, no PVCs   EP study 2/3/2022 but no inducible ventricular tachycardia and negative procainamide challenge test.  Normal QT interval in baseline state with appropriate shortening during atrial pacing and isoproterenol.  Normal HV interval   Neg TTT 4/19/2022  Loop recorder implant 7/29/2022   RBBB  HTN  HLD   SANCHEZ   GERD       History of Present Illness   Timi Hernandez is a 44 y.o. male who presents to my electrophysiology clinic for follow up of tachypalpitations, SOB, LH, and dizziness.  Patient had a negative EP study in 2022.  Since last time I saw the patient, he denies  "palpitations, shortness of breath, lightheadedness, dizziness and syncope.  He states his heart rate is elevated in the afternoons.  He stopped taking his propranolol because he felt it wasn't making a difference.  Loop shows no events.    Outpatient Medications Marked as Taking for the 5/13/25 encounter (Office Visit) with Abram Gilbert MD   Medication Sig Dispense Refill    aspirin 81 MG EC tablet Take 1 tablet by mouth Daily.      cetirizine (zyrTEC) 10 MG tablet Zyrtec 10 mg oral tablet take 1 tablet (10 mg) by oral route once daily   Active      clonazePAM (KlonoPIN) 1 MG tablet Take 1 tablet by mouth As Needed.      pantoprazole (PROTONIX) 40 MG EC tablet       Thiamine HCl (vitamin B-1) 50 MG tablet Take 0.5 tablets by mouth Daily.      Vyvanse 30 MG capsule Take 1 capsule by mouth Daily      Wegovy 1 MG/0.5ML solution auto-injector               Physical Exam  Vitals:    05/13/25 1211   BP: 138/86   BP Location: Left arm   Patient Position: Sitting   Pulse: 65   SpO2: 97%   Weight: 83.9 kg (185 lb)   Height: 182.9 cm (72\")     Body mass index is 25.09 kg/m².  Constitutional:       Appearance: Healthy appearance.   Neck:      Vascular: JVD normal.   Pulmonary:      Effort: Pulmonary effort is normal.      Breath sounds: Normal breath sounds.   Cardiovascular:      Normal rate. Regular rhythm. Normal S1. Normal S2.       Murmurs: There is no murmur.      No gallop.  No rub.   Pulses:     Intact distal pulses.   Edema:     Peripheral edema absent.   Neurological:      General: No focal deficit present.          Diagnostic Data    ECG 12 Lead    Date/Time: 5/13/2025 12:42 PM  Performed by: Mckenzie Devine APRN    Authorized by: Mckenzie Devine APRN  Comparison: compared with previous ECG from 4/12/2024  Rhythm: sinus rhythm  Rate: normal  BPM: 65  Conduction: right bundle branch block  QRS axis: normal    Clinical impression: abnormal EKG          Lab Results   Component Value Date    GLUCOSE 89 03/05/2025    " CALCIUM 9.5 2025     2025    K 4.0 2025    CO2 28.4 2025     2025    BUN 15 2025    CREATININE 1.07 2025    EGFRIFNONA 91 2022    BCR 14.0 2025    ANIONGAP 7.6 2025     Lab Results   Component Value Date    WBC 5.83 2025    HGB 14.7 2025    HCT 43.0 2025    MCV 86.0 2025     2025     Lab Results   Component Value Date    INR 0.98 2023    INR 1.08 2022    INR 1.05 (L) 2022    PROTIME 13.1 2023    PROTIME 14.1 2022    PROTIME 10.9 2022     Lab Results   Component Value Date    TSH 1.290 2025    THYROIDAB <8 2022       I personally viewed and interpreted the patient's EKG/Telemetry/lab data    Timi GARCIA Mary  reports that he has never smoked. He has never used smokeless tobacco. I have educated him on the risk of diseases from using tobacco products such as cancer, COPD, and heart disease.            Assessment and Plan  Diagnoses and all orders for this visit:    1. Syncope and collapse (Primary)    2. Sinus tachycardia    3. Long Q-T syndrome    4. Brugada syndrome    5. Prehypertension    Other orders  -     ECG 12 Lead        Syncope   -No recent syncopal episode  - Extensive monitoring during with his implant recorder as well as extensive EP studies demonstrate no significant arrhythmias noted.    -Loop recorder today shows no episodes. Battery ok. NSR at 70.   - discontinued Nadolol 2022 with resolution of his syncopal episodes.   - Expect loop battery to  at some point this summer.  Discussed options of removing old loop versus leaving old loop versus reimplanting new loop recorder. Patient will think about this and let us know at the time the battery expires.      Sinus tachycardia   -Patient states tachycardia is related to anxiety. Felt like propanolol did not make a difference. Currently not taking.      Mixed Long QT 3 Syndrome/Brugada  Syndrome  -Extensive work-up performed. QT interval stable overall.      Hypertension   -Allow for permissive hypertension so it does not worsen episodes of syncope.       Follow-Up  Return if symptoms worsen or fail to improve.      Thank you for allowing me to participate in the care of your patient. Please to not hesitate to contact me with additional questions or concerns.

## 2025-05-16 ENCOUNTER — HOSPITAL ENCOUNTER (OUTPATIENT)
Dept: MRI IMAGING | Facility: HOSPITAL | Age: 45
Discharge: HOME | End: 2025-05-16
Admitting: NURSE PRACTITIONER
Payer: COMMERCIAL

## 2025-05-16 DIAGNOSIS — M51.362 DEGENERATION OF INTERVERTEBRAL DISC OF LUMBAR REGION WITH DISCOGENIC BACK PAIN AND LOWER EXTREMITY PAIN: ICD-10-CM

## 2025-05-16 PROCEDURE — 72148 MRI LUMBAR SPINE W/O DYE: CPT

## 2025-05-27 ENCOUNTER — OFFICE VISIT (OUTPATIENT)
Dept: NEUROSURGERY | Facility: CLINIC | Age: 45
End: 2025-05-27
Payer: COMMERCIAL

## 2025-05-27 VITALS
DIASTOLIC BLOOD PRESSURE: 77 MMHG | HEIGHT: 72 IN | SYSTOLIC BLOOD PRESSURE: 112 MMHG | BODY MASS INDEX: 25.8 KG/M2 | WEIGHT: 190.5 LBS

## 2025-05-27 DIAGNOSIS — M54.42 CHRONIC MIDLINE LOW BACK PAIN WITH LEFT-SIDED SCIATICA: Primary | ICD-10-CM

## 2025-05-27 DIAGNOSIS — G89.29 CHRONIC MIDLINE LOW BACK PAIN WITH LEFT-SIDED SCIATICA: Primary | ICD-10-CM

## 2025-05-27 PROCEDURE — 99214 OFFICE O/P EST MOD 30 MIN: CPT | Performed by: NEUROLOGICAL SURGERY
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
## 2025-06-02 ENCOUNTER — CLINICAL SUPPORT (OUTPATIENT)
Dept: FAMILY MEDICINE CLINIC | Facility: CLINIC | Age: 45
End: 2025-06-02
Payer: COMMERCIAL

## 2025-06-02 ENCOUNTER — OFFICE VISIT (OUTPATIENT)
Dept: CARDIOLOGY | Facility: CLINIC | Age: 45
End: 2025-06-02
Payer: COMMERCIAL

## 2025-06-02 VITALS
SYSTOLIC BLOOD PRESSURE: 118 MMHG | BODY MASS INDEX: 25.73 KG/M2 | WEIGHT: 190 LBS | DIASTOLIC BLOOD PRESSURE: 78 MMHG | HEART RATE: 69 BPM | HEIGHT: 72 IN

## 2025-06-02 DIAGNOSIS — E78.5 DYSLIPIDEMIA: ICD-10-CM

## 2025-06-02 DIAGNOSIS — I49.8 BRUGADA SYNDROME: ICD-10-CM

## 2025-06-02 DIAGNOSIS — I45.3: ICD-10-CM

## 2025-06-02 DIAGNOSIS — Z82.41 FAMILY HISTORY OF SUDDEN CARDIAC DEATH (SCD): ICD-10-CM

## 2025-06-02 DIAGNOSIS — I45.81 LONG Q-T SYNDROME: Primary | ICD-10-CM

## 2025-06-02 DIAGNOSIS — Z11.1 VISIT FOR TB SKIN TEST: Primary | ICD-10-CM

## 2025-06-02 DIAGNOSIS — Z95.818 IMPLANTABLE LOOP RECORDER PRESENT: ICD-10-CM

## 2025-06-02 PROBLEM — I10 ESSENTIAL HYPERTENSION: Status: RESOLVED | Noted: 2024-10-07 | Resolved: 2025-06-02

## 2025-06-02 PROBLEM — R55 SYNCOPE AND COLLAPSE: Status: RESOLVED | Noted: 2022-07-07 | Resolved: 2025-06-02

## 2025-06-02 PROBLEM — R00.0 SINUS TACHYCARDIA: Status: RESOLVED | Noted: 2024-10-07 | Resolved: 2025-06-02

## 2025-06-02 PROBLEM — R12 HEARTBURN: Status: RESOLVED | Noted: 2024-08-15 | Resolved: 2025-06-02

## 2025-06-02 PROCEDURE — 86580 TB INTRADERMAL TEST: CPT

## 2025-06-02 PROCEDURE — 99214 OFFICE O/P EST MOD 30 MIN: CPT | Performed by: NURSE PRACTITIONER

## 2025-06-02 NOTE — PROGRESS NOTES
Chief Complaint  Follow-up (11/25/24: Right Bundle Branch Block) and Hypertension    Subjective            History of Present Illness  Timi Hernandez is a 44-year-old male patient who presents to the office today for follow up.    History of Present Illness  The patient presents for evaluation of long QT syndrome and cholesterol management.    He is currently asymptomatic, with no episodes of lightheadedness or dizziness. He reports good tolerance to his current medication regimen. He is on a daily regimen of baby aspirin. [facility]     MEDICATIONS  Aspirin, Wegovy        PMH  Past Medical History:   Diagnosis Date    ADHD (attention deficit hyperactivity disorder) 1992    Anemia     Anxiety     Arthritis     Brain concussion     Brugada syndrome 11/16/2021    Chronic allergic rhinitis     CTS (carpal tunnel syndrome)     Depression 2004    Diverticulitis of colon     Diverticulosis 2019    Essential hypertension     Family history of colon cancer in mother     Fatty liver     GERD (gastroesophageal reflux disease)     Gout     Headache     Hepatomegaly     HL (hearing loss) 2004    Hyperlipidemia     IBS (irritable bowel syndrome)     Ingrown toenail     Kidney stones     Low back pain 2022    Lumbosacral disc disease     Lyme disease 2021    Plantar fasciitis     Right bundle branch block (RBBB), 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)     Syncope          ALLERGY  Allergies   Allergen Reactions    Penicillins Anaphylaxis    Sulfamethoxazole-Trimethoprim Anaphylaxis and Rash     Other reaction(s): sore throat and ulcers    Colchicine Rash     PT stated body was burning          SURGICALHX  Past Surgical History:   Procedure Laterality Date    ADENOIDECTOMY  1993    CARDIAC CATHETERIZATION  2022    CARDIAC ELECTROPHYSIOLOGY PROCEDURE N/A 02/03/2022    Procedure: EPS + Procainimide Challenge +/- SICD (BSC), no meds to hold;  Surgeon:  Lang Sorensen MD;  Location: [facility] EP INVASIVE LOCATION;  Service: Cardiology;  Laterality: N/A;    CHOLECYSTECTOMY  2017    Dr. Ray    COLONOSCOPY  2019    COLONOSCOPY N/A 12/05/2024    Procedure: COLONOSCOPY;  Surgeon: Brennan Mendez MD;  Location: [facility] ENDOSCOPY;  Service: Gastroenterology;  Laterality: N/A;  DIVERTICULOSIS, HEMORRHOIDS    ENDOSCOPY N/A 12/05/2024    Procedure: ESOPHAGOGASTRODUODENOSCOPY WITH BIOPSIES;  Surgeon: Brennan Mendez MD;  Location: [facility] ENDOSCOPY;  Service: Gastroenterology;  Laterality: N/A;  GASTRITIS    EPIDURAL BLOCK  May 2023    EYE SURGERY  2008    GALLBLADDER SURGERY      HERNIA REPAIR      INGUINAL HERNIA REPAIR  1984    LASIK      LIVER BIOPSY      TOE SURGERY      TONSILLECTOMY      TRIGGER POINT INJECTION  June 2023    VASCULAR SURGERY  Feb 2022    VASECTOMY            SOC  Social History     Socioeconomic History    Marital status:    Tobacco Use    Smoking status: Never    Smokeless tobacco: Never    Tobacco comments:     second hand smoke exposure-never   Vaping Use    Vaping status: Never Used   Substance and Sexual Activity    Alcohol use: Not Currently     Comment: occ    Drug use: Never    Sexual activity: Yes     Partners: Female     Birth control/protection: Vasectomy, Surgical     Comment: Vasectomy         FAMHX  Family History   Problem Relation Age of Onset    Heart failure Mother         Heart attack in her early 50s    Hyperlipidemia Mother     Heart attack Mother     Hypertension Mother     Colon cancer Mother 53    Stroke Mother     Alcohol abuse Mother     Anxiety disorder Mother     Cancer Mother         colon    Developmental Disability Mother     Arrhythmia Mother     Heart disease Father     Alcohol abuse Father     Heart disease Sister     Lung cancer Maternal Uncle         50s    Heart disease Maternal Uncle         Cardiomyopathy    Alcohol abuse Maternal Uncle     Stroke Maternal Grandfather     Heart disease Maternal  "Grandfather         Cardiac arrest Liu of the arteries    Heart attack Other     Heart disease Other     Heart failure Other     Hypertension Other     Hyperlipidemia Other           MEDSIGONLY  Current Outpatient Medications on File Prior to Visit   Medication Sig    aspirin 81 MG EC tablet Take 1 tablet by mouth Daily.    cetirizine (zyrTEC) 10 MG tablet Zyrtec 10 mg oral tablet take 1 tablet (10 mg) by oral route once daily   Active    clonazePAM (KlonoPIN) 1 MG tablet Take 1 tablet by mouth As Needed.    pantoprazole (PROTONIX) 40 MG EC tablet     Vyvanse 30 MG capsule Take 1 capsule by mouth Daily    Wegovy 1 MG/0.5ML solution auto-injector     Thiamine HCl (vitamin B-1) 50 MG tablet Take 0.5 tablets by mouth Daily. (Patient not taking: Reported on 6/2/2025)     No current facility-administered medications on file prior to visit.         Objective   /78   Pulse 69   Ht 182.9 cm (72\")   Wt 86.2 kg (190 lb)   BMI 25.77 kg/m²       Physical Exam  HENT:      Head: Normocephalic.   Neck:      Vascular: No carotid bruit.   Cardiovascular:      Rate and Rhythm: Normal rate and regular rhythm.      Pulses: Normal pulses.      Heart sounds: Normal heart sounds. No murmur heard.  Pulmonary:      Effort: Pulmonary effort is normal.      Breath sounds: Normal breath sounds.   Musculoskeletal:      Cervical back: Neck supple.      Right lower leg: No edema.      Left lower leg: No edema.   Skin:     General: Skin is dry.   Neurological:      Mental Status: He is alert and oriented to person, place, and time.   Psychiatric:         Behavior: Behavior normal.           Result Review :   The following data was reviewed by: NICOLE Martínez on 06/02/2025:  No results found for: \"PROBNP\"  CMP          10/15/2024    09:42 3/5/2025    09:00   CMP   Glucose 100  89    BUN 16  15    Creatinine 1.01  1.07    EGFR 94.0  87.8    Sodium 137  136    Potassium 4.3  4.0    Chloride 103  100    Calcium 9.7  9.5    Total " "Protein 7.4  7.1    Albumin 4.6  4.2    Globulin 2.8  2.9    Total Bilirubin 0.5  0.8    Alkaline Phosphatase 51  48    AST (SGOT) 18  18    ALT (SGPT) 16  13    Albumin/Globulin Ratio 1.6  1.4    BUN/Creatinine Ratio 15.8  14.0    Anion Gap 4.6  7.6          10/15/2024    09:42 3/5/2025    09:00   CBC w/Diff   WBC 7.29  5.83    RBC 4.87  5.00    Hemoglobin 15.4  14.7    Hematocrit 42.7  43.0    MCV 87.7  86.0    MCH 31.6  29.4    MCHC 36.1  34.2    RDW 13.0  13.2    Platelets 201  192    Neutrophil Rel % 57.1  58.7    Immature Granulocyte Rel % 0.7  0.3    Lymphocyte Rel % 34.7  34.6    Monocyte Rel % 6.0  5.1    Eosinophil Rel % 1.2  1.0    Basophil Rel % 0.3  0.3       Lab Results   Component Value Date    TSH 1.290 03/05/2025      Lab Results   Component Value Date    FREET4 1.20 03/01/2022      No results found for: \"DDIMERQUANT\"  Magnesium   Date Value Ref Range Status   04/12/2024 2.3 1.6 - 2.6 mg/dL Final      No results found for: \"DIGOXIN\"   No results found for: \"TROPONINT\"        Lipid Panel          10/15/2024    09:42 3/5/2025    09:00   Lipid Panel   Total Cholesterol 202  201    Triglycerides 235  266    HDL Cholesterol 35  31    VLDL Cholesterol 42  47    LDL Cholesterol  125  123    LDL/HDL Ratio 3.43  3.77             Assessment and Plan    Diagnoses and all orders for this visit:    1. Long Q-T syndrome (Primary)    2. Brugada syndrome    3. Implantable loop recorder present    4. Dyslipidemia    5. Family history of sudden cardiac death (SCD)    6. Right bundle branch block (RBBB), 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)      Assessment & Plan  1. Long QT syndrome.  His blood pressure readings are within the normal range today at 118/78. The EKG conducted on 05/13/2025 indicated stable QT measurement. He is currently on a daily regimen of baby aspirin. The loop recorder, implanted in 2022, has not yielded any significant findings. Continue monitoring with serial " EKGs.     2. Cholesterol management.  His cholesterol levels are not currently being treated, but he is on Wegovy. His triglyceride levels have significantly decreased, and his total cholesterol level, as per the March 2025 labs, is only mildly elevated. His LDL level is lower than it has been in a considerable period. He will continue with Wegovy and diet changes.    Follow-up  The patient will follow up in 6 months.      Follow Up   Return in about 6 months (around 12/2/2025) for Follow up with Dr Sawant.    Patient was given instructions and counseling regarding his condition or for health maintenance advice. Please see specific information pulled into the AVS if appropriate.     Timi Hernandez  reports that he has never smoked. He has never used smokeless tobacco.            Patient or patient representative verbalized consent for the use of Ambient Listening during the visit with  NICOLE Martínez for chart documentation. 6/3/2025  00:56 EDT    NICOLE Martínez  06/02/25  08:34 EDT    Dictated Utilizing Dragon Dictation

## 2025-06-04 ENCOUNTER — CLINICAL SUPPORT (OUTPATIENT)
Dept: FAMILY MEDICINE CLINIC | Facility: CLINIC | Age: 45
End: 2025-06-04
Payer: COMMERCIAL

## 2025-06-04 DIAGNOSIS — Z11.1 VISIT FOR TB SKIN TEST: Primary | ICD-10-CM

## 2025-06-04 LAB
INDURATION: 0 MM (ref 0–10)
Lab: NORMAL
Lab: NORMAL
TB SKIN TEST: NEGATIVE

## 2025-06-05 ENCOUNTER — PATIENT MESSAGE (OUTPATIENT)
Dept: CARDIOLOGY | Facility: CLINIC | Age: 45
End: 2025-06-05
Payer: COMMERCIAL

## 2025-07-02 LAB
MDC_IDC_MSMT_BATTERY_STATUS: NORMAL
MDC_IDC_PG_IMPLANT_DTM: NORMAL
MDC_IDC_PG_MFG: NORMAL
MDC_IDC_PG_MODEL: NORMAL
MDC_IDC_PG_SERIAL: NORMAL
MDC_IDC_PG_TYPE: NORMAL
MDC_IDC_SESS_DTM: NORMAL
MDC_IDC_SESS_TYPE: NORMAL
MDC_IDC_STAT_AT_BURDEN_PERCENT: 0

## 2025-07-02 PROCEDURE — 93298 REM INTERROG DEV EVAL SCRMS: CPT | Performed by: INTERNAL MEDICINE

## 2025-08-11 PROBLEM — R55 SYNCOPE AND COLLAPSE: Status: ACTIVE | Noted: 2025-08-11

## 2025-08-18 LAB
MDC_IDC_MSMT_BATTERY_STATUS: NORMAL
MDC_IDC_PG_IMPLANT_DTM: NORMAL
MDC_IDC_PG_MFG: NORMAL
MDC_IDC_PG_MODEL: NORMAL
MDC_IDC_PG_SERIAL: NORMAL
MDC_IDC_PG_TYPE: NORMAL
MDC_IDC_SESS_DTM: NORMAL
MDC_IDC_SESS_TYPE: NORMAL
MDC_IDC_STAT_AT_BURDEN_PERCENT: 0

## (undated) DEVICE — LEX ELECTRO PHYSIOLOGY: Brand: MEDLINE INDUSTRIES, INC.

## (undated) DEVICE — SINGLE-USE BIOPSY FORCEPS: Brand: RADIAL JAW 4

## (undated) DEVICE — ST EXT IV SMARTSITE 2VLV SP M LL 5ML IV1

## (undated) DEVICE — DECANT BG O JET

## (undated) DEVICE — BLCK/BITE BLOX WO/DENTL/RIM W/STRAP 54F

## (undated) DEVICE — SOL NACL 0.9PCT 1000ML

## (undated) DEVICE — LINER SURG CANSTR SXN S/RIGD 1500CC

## (undated) DEVICE — SOLIDIFIER LIQLOC PLS 1500CC BT

## (undated) DEVICE — LIMB HOLDER, WRIST/ANKLE: Brand: DEROYAL

## (undated) DEVICE — CANN NASL CO2 DIVIDED A/

## (undated) DEVICE — SET PRIMARY GRVTY 10DP MALE LL 104IN

## (undated) DEVICE — SOL IRRG H2O PL/BG 1000ML STRL

## (undated) DEVICE — CONN JET HYDRA H20 AUXILIARY DISP

## (undated) DEVICE — Device: Brand: DEFENDO AIR/WATER/SUCTION AND BIOPSY VALVE

## (undated) DEVICE — ST INF PRI SMRTSTE 20DRP 2VLV 24ML 117

## (undated) DEVICE — CATH QUAD CRD 6F5MM

## (undated) DEVICE — ELECTRD RETRN/GRND MEGADYNE SGL/PLT W/CORD 9FT DISP

## (undated) DEVICE — Device

## (undated) DEVICE — ADULT, W/LG. BACK PAD, RADIOTRANSPARENT ELEMENT AND LEAD WIRE: Brand: DEFIBRILLATION ELECTRODES

## (undated) DEVICE — DRSNG SURESITE123 4X4.8IN

## (undated) DEVICE — INTRO SHEATH ENGAGE W/50 GW .038 7F12

## (undated) DEVICE — SYS CLS VASC/VENI VASCADE MVP 6TO12F